# Patient Record
Sex: FEMALE | Race: WHITE | NOT HISPANIC OR LATINO | Employment: FULL TIME | URBAN - METROPOLITAN AREA
[De-identification: names, ages, dates, MRNs, and addresses within clinical notes are randomized per-mention and may not be internally consistent; named-entity substitution may affect disease eponyms.]

---

## 2023-03-15 DIAGNOSIS — I10 ESSENTIAL (PRIMARY) HYPERTENSION: ICD-10-CM

## 2023-03-15 DIAGNOSIS — E78.5 HYPERLIPIDEMIA, UNSPECIFIED: ICD-10-CM

## 2023-03-15 DIAGNOSIS — G43.909 MIGRAINE, UNSPECIFIED, NOT INTRACTABLE, WITHOUT STATUS MIGRAINOSUS: ICD-10-CM

## 2023-03-17 RX ORDER — ROSUVASTATIN CALCIUM 20 MG/1
TABLET, COATED ORAL
Qty: 15 TABLET | Refills: 2 | Status: SHIPPED | OUTPATIENT
Start: 2023-03-17 | End: 2023-05-26 | Stop reason: SDUPTHER

## 2023-03-17 RX ORDER — PROPRANOLOL HYDROCHLORIDE 10 MG/1
TABLET ORAL
Qty: 60 TABLET | Refills: 2 | Status: SHIPPED | OUTPATIENT
Start: 2023-03-17 | End: 2023-05-26 | Stop reason: SDUPTHER

## 2023-03-17 RX ORDER — LOSARTAN POTASSIUM 100 MG/1
TABLET ORAL
Qty: 30 TABLET | Refills: 5 | Status: SHIPPED | OUTPATIENT
Start: 2023-03-17 | End: 2023-05-26 | Stop reason: SDUPTHER

## 2023-04-17 DIAGNOSIS — F41.8 OTHER SPECIFIED ANXIETY DISORDERS: ICD-10-CM

## 2023-04-18 RX ORDER — ESCITALOPRAM OXALATE 10 MG/1
5 TABLET ORAL DAILY
Qty: 15 TABLET | Refills: 0 | Status: SHIPPED | OUTPATIENT
Start: 2023-04-18 | End: 2023-05-24

## 2023-05-23 DIAGNOSIS — F41.8 OTHER SPECIFIED ANXIETY DISORDERS: ICD-10-CM

## 2023-05-23 DIAGNOSIS — E03.9 HYPOTHYROIDISM, UNSPECIFIED: ICD-10-CM

## 2023-05-24 RX ORDER — LEVOTHYROXINE SODIUM 25 UG/1
TABLET ORAL
Qty: 30 TABLET | Refills: 0 | Status: SHIPPED | OUTPATIENT
Start: 2023-05-24 | End: 2023-05-26 | Stop reason: SDUPTHER

## 2023-05-24 RX ORDER — ESCITALOPRAM OXALATE 10 MG/1
5 TABLET ORAL DAILY
Qty: 15 TABLET | Refills: 0 | Status: SHIPPED | OUTPATIENT
Start: 2023-05-24 | End: 2023-05-26 | Stop reason: SDUPTHER

## 2023-05-25 PROBLEM — M48.061 LUMBAR STENOSIS: Status: ACTIVE | Noted: 2023-05-25

## 2023-05-25 PROBLEM — G43.909 MIGRAINE, UNSPECIFIED, NOT INTRACTABLE, WITHOUT STATUS MIGRAINOSUS: Status: ACTIVE | Noted: 2023-05-25

## 2023-05-25 PROBLEM — M17.12 PRIMARY LOCALIZED OSTEOARTHROSIS OF LEFT LOWER LEG: Status: ACTIVE | Noted: 2023-05-25

## 2023-05-25 PROBLEM — Z00.00 ANNUAL PHYSICAL EXAM: Status: ACTIVE | Noted: 2023-05-25

## 2023-05-25 PROBLEM — F41.8 SITUATIONAL ANXIETY: Status: ACTIVE | Noted: 2023-05-25

## 2023-05-25 PROBLEM — Z12.31 ENCOUNTER FOR SCREENING MAMMOGRAM FOR MALIGNANT NEOPLASM OF BREAST: Status: ACTIVE | Noted: 2023-05-25

## 2023-05-25 PROBLEM — I10 BENIGN ESSENTIAL HYPERTENSION: Status: ACTIVE | Noted: 2023-05-25

## 2023-05-25 PROBLEM — F41.8 DEPRESSION WITH ANXIETY: Status: ACTIVE | Noted: 2023-05-25

## 2023-05-25 PROBLEM — H90.A21 SENSORINEURAL HEARING LOSS (SNHL) OF RIGHT EAR WITH RESTRICTED HEARING OF LEFT EAR: Status: ACTIVE | Noted: 2023-05-25

## 2023-05-25 PROBLEM — N83.209 OVARIAN CYST: Status: ACTIVE | Noted: 2023-05-25

## 2023-05-25 PROBLEM — E78.5 HYPERLIPIDEMIA: Status: ACTIVE | Noted: 2023-05-25

## 2023-05-25 RX ORDER — AMLODIPINE BESYLATE 5 MG/1
10 TABLET ORAL DAILY
COMMUNITY
Start: 2022-08-14 | End: 2023-05-26 | Stop reason: SDUPTHER

## 2023-05-25 RX ORDER — RIZATRIPTAN BENZOATE 10 MG/1
10 TABLET, ORALLY DISINTEGRATING ORAL ONCE AS NEEDED
COMMUNITY
Start: 2014-10-09 | End: 2023-05-26 | Stop reason: SDUPTHER

## 2023-05-25 RX ORDER — VIT C/E/ZN/COPPR/LUTEIN/ZEAXAN 250MG-90MG
25 CAPSULE ORAL DAILY
COMMUNITY
Start: 2022-06-27

## 2023-05-25 RX ORDER — LANOLIN ALCOHOL/MO/W.PET/CERES
1 CREAM (GRAM) TOPICAL DAILY
COMMUNITY
Start: 2022-06-27 | End: 2024-03-12 | Stop reason: WASHOUT

## 2023-05-26 ENCOUNTER — OFFICE VISIT (OUTPATIENT)
Dept: PRIMARY CARE | Facility: CLINIC | Age: 58
End: 2023-05-26
Payer: COMMERCIAL

## 2023-05-26 VITALS
BODY MASS INDEX: 35.24 KG/M2 | RESPIRATION RATE: 16 BRPM | WEIGHT: 225 LBS | DIASTOLIC BLOOD PRESSURE: 76 MMHG | OXYGEN SATURATION: 95 % | SYSTOLIC BLOOD PRESSURE: 118 MMHG | HEART RATE: 84 BPM

## 2023-05-26 DIAGNOSIS — Z12.31 ENCOUNTER FOR SCREENING MAMMOGRAM FOR MALIGNANT NEOPLASM OF BREAST: ICD-10-CM

## 2023-05-26 DIAGNOSIS — E78.5 HYPERLIPIDEMIA, UNSPECIFIED: ICD-10-CM

## 2023-05-26 DIAGNOSIS — F41.8 OTHER SPECIFIED ANXIETY DISORDERS: ICD-10-CM

## 2023-05-26 DIAGNOSIS — Z12.11 SCREEN FOR COLON CANCER: ICD-10-CM

## 2023-05-26 DIAGNOSIS — E78.5 HYPERLIPIDEMIA, UNSPECIFIED HYPERLIPIDEMIA TYPE: ICD-10-CM

## 2023-05-26 DIAGNOSIS — G43.909 MIGRAINE, UNSPECIFIED, NOT INTRACTABLE, WITHOUT STATUS MIGRAINOSUS: ICD-10-CM

## 2023-05-26 DIAGNOSIS — Z80.0 FAMILY HISTORY OF COLON CANCER: ICD-10-CM

## 2023-05-26 DIAGNOSIS — E03.9 HYPOTHYROIDISM, UNSPECIFIED: ICD-10-CM

## 2023-05-26 DIAGNOSIS — I10 BENIGN ESSENTIAL HYPERTENSION: ICD-10-CM

## 2023-05-26 DIAGNOSIS — Z00.00 ANNUAL PHYSICAL EXAM: Primary | ICD-10-CM

## 2023-05-26 PROCEDURE — 1036F TOBACCO NON-USER: CPT | Performed by: SPECIALIST

## 2023-05-26 PROCEDURE — 3078F DIAST BP <80 MM HG: CPT | Performed by: SPECIALIST

## 2023-05-26 PROCEDURE — 99396 PREV VISIT EST AGE 40-64: CPT | Performed by: SPECIALIST

## 2023-05-26 PROCEDURE — 3074F SYST BP LT 130 MM HG: CPT | Performed by: SPECIALIST

## 2023-05-26 RX ORDER — LEVOTHYROXINE SODIUM 25 UG/1
25 TABLET ORAL
Qty: 90 TABLET | Refills: 1 | Status: SHIPPED | OUTPATIENT
Start: 2023-05-26 | End: 2024-01-27

## 2023-05-26 RX ORDER — RIZATRIPTAN BENZOATE 10 MG/1
10 TABLET, ORALLY DISINTEGRATING ORAL ONCE AS NEEDED
Qty: 9 TABLET | Refills: 1 | Status: SHIPPED | OUTPATIENT
Start: 2023-05-26

## 2023-05-26 RX ORDER — AMLODIPINE BESYLATE 5 MG/1
10 TABLET ORAL DAILY
Qty: 90 TABLET | Refills: 1 | Status: SHIPPED | OUTPATIENT
Start: 2023-05-26 | End: 2023-05-26

## 2023-05-26 RX ORDER — PROPRANOLOL HYDROCHLORIDE 10 MG/1
10 TABLET ORAL 2 TIMES DAILY
Qty: 180 TABLET | Refills: 1 | Status: SHIPPED | OUTPATIENT
Start: 2023-05-26 | End: 2024-05-14

## 2023-05-26 RX ORDER — AMLODIPINE BESYLATE 10 MG/1
10 TABLET ORAL DAILY
Qty: 90 TABLET | Refills: 1 | Status: SHIPPED | OUTPATIENT
Start: 2023-05-26 | End: 2024-01-27

## 2023-05-26 RX ORDER — ESCITALOPRAM OXALATE 10 MG/1
5 TABLET ORAL DAILY
Qty: 90 TABLET | Refills: 1 | Status: SHIPPED | OUTPATIENT
Start: 2023-05-26

## 2023-05-26 RX ORDER — LOSARTAN POTASSIUM 100 MG/1
100 TABLET ORAL DAILY
Qty: 90 TABLET | Refills: 1 | Status: SHIPPED | OUTPATIENT
Start: 2023-05-26 | End: 2024-05-14

## 2023-05-26 RX ORDER — ROSUVASTATIN CALCIUM 20 MG/1
20 TABLET, COATED ORAL DAILY
Qty: 90 TABLET | Refills: 1 | Status: SHIPPED | OUTPATIENT
Start: 2023-05-26 | End: 2024-01-27

## 2023-05-26 NOTE — ASSESSMENT & PLAN NOTE
Migraines a bit worse over past month and a bit more frequent but was on midnights  Now back on daylight and weather changes impact her migraines  Will let me know if doesn't resume usual pattern discussed will need imaging if it does not  Refilled medication

## 2023-05-26 NOTE — PROGRESS NOTES
Subjective   Patient ID: Scott Leung is a 58 y.o. female who presents for medication follow up and Annual Exam.  HPI    57 yo female  Pmhx sig for Htn, Hyperlipidemia, Hypothyroidism, Migraines, Anxiety, Fam Hx CAD, Fam Hx Autoimmune disorder (Scleroderma/Crest, Sister)  Last wellness exam 8/27/2021    Tried to schedule sick visit here (said she was transferred to scheduling but no one picked up) went to urgent care     Not taking B12 daily, will check level  Does PT to strengthen hips and labs, seeing Dr. Razo    CT Cardiac Score 8/3/2022 zero  Former Spouse Redd Bustillo  Taking Baby Aspirin 81 mg daily, discussed current guidelines not currently indicated, will switch to every other day    Cannot get Td due to Allergy  Had Shingrix vaccines 2/2020  Colonoscopy 11/2017, repeat 10 yrs (sigmoid diverticulosis)  Prior negative screen for hep c  Mammogram 9/9/2021 ordered    Wants to stay on same dose of medication for mood      Allergies   Allergen Reactions    Tetanus Vaccines And Toxoid Other    Latex Rash      Current Outpatient Medications   Medication Instructions    amLODIPine (NORVASC) 10 mg, oral, Daily    ASPIRIN LOW-STRENGTH ORAL 81 mg, oral, Every other day    cholecalciferol (VITAMIN D-3) 25 mcg, oral, Daily    cyanocobalamin (Vitamin B-12) 1,000 mcg tablet 1 tablet, oral, Daily    escitalopram (LEXAPRO) 5 mg, oral, Daily    levothyroxine (SYNTHROID, LEVOXYL) 25 mcg, oral, Daily before breakfast    losartan (COZAAR) 100 mg, oral, Daily    propranolol (INDERAL) 10 mg, oral, 2 times daily    rizatriptan MLT (MAXALT-MLT) 10 mg, oral, Once as needed, May repeat once in 2 hours if needed (Maximum 2 tablets in 24 hours)    rosuvastatin (CRESTOR) 20 mg, oral, Daily        Review of Systems  Constitutional  Always fatigue, no fevers, no chills, no unintentional weight loss,   Mood Some days good some days not as good, not interested in dose change, not sleeping well no suicidal ideation  occasional tearful, no anhedonia  HEENT:  No headaches, no dizziness, no double vision, occasionally blurred vision, to see optho, no hearing loss  Cardiovascular:  No  chest pain, occasional at night last 15-20 seconds palpitations no associated sx, cough stops, no shortness of breath with exertion (one flight of stairs),   Respiratory:  No cough, no hemoptysis, no wheezing, No shortness of breath at rest  GI:  No dysphagia, no odynophagia, no reflux, no abdominal pain, no nausea, no vomiting, no changes in bowel habits, no bright red blood per rectum, no melena  :  No urinary frequency, no dysuria, stress and standing urine incontinence  MSK:  No falls, positive joint pain, no joint swelling  Neuro:  No tremors, no extremity weakness, occasional changes in sensation in legs (sees spine)    Physical Exam  /76   Pulse 84   Resp 16   Wt 102 kg (225 lb)   SpO2 95%   BMI 35.24 kg/m²   General:    Well-appearing  F in no acute distress, well nourished, well hydrated  Head:  Normocephalic, atraumatic  Skin:          Warm dry,   Eyes:  Anicteric sclera, pupils equal,   Ears:        TMs intact  Oral:                Not examined due to pandemic  Neck:   Supple, no cervical/supraclavicular adenopathy, no thyromegaly or nodules appreciated on exam  Cor:      Regular rate, normal S1, S2, no murmurs appreciated, no S3, no S4   Lungs:   Clear to auscultation b/l, no wheezes, no rhonchi, no crackles, no accessory respiratory muscle use  Abd:          Soft, nontender, no guarding, no rebound, no hepatosplenomegaly appreciated   Ext:            No lower extremity edema, no palpable cords  Pulses:      Pedal pulses intact  Neuro:   CN2-12 grossly intact    Breasts:     No Axillary adenopathy, no discrete palpable breast nodules, no overlying skin changes, no nipple discharge    Assessment/Plan   Problem List Items Addressed This Visit          Circulatory    Benign essential hypertension     Well controlled on current  medication         Relevant Medications    losartan (Cozaar) 100 mg tablet    amLODIPine (Norvasc) 10 mg tablet    Other Relevant Orders    CBC    Comprehensive Metabolic Panel       Other    Migraine, unspecified, not intractable, without status migrainosus     Migraines a bit worse over past month and a bit more frequent but was on midnights  Now back on daylight and weather changes impact her migraines  Will let me know if doesn't resume usual pattern discussed will need imaging if it does not  Refilled medication         Relevant Medications    rizatriptan MLT (Maxalt-MLT) 10 mg disintegrating tablet    propranolol (Inderal) 10 mg tablet    Hyperlipidemia     Ct Cardiac Score 0 (8/3/2022)  LDL 85 on Crestor 20 mg one-half tab daily  Prior Cardiology Evaluation         Relevant Orders    Comprehensive Metabolic Panel    Lipid Panel    Annual physical exam - Primary     -Recommemd annual influenza vaccine  -Previously received 2 Covid vaccines, 1 booster, recommend bivalent vaccine  -Has not had Covid  -Unable to get Td due to adverse reaction  -Previously received both Shingrix vaccines, said she is sure she had second done here but not documented by medical assistant  -Mammogram 9/9/2021, ordered  -Recommend annual gynecology exam, will schedule with Dr. Caicedo  -Previous negative screen for Hepatitis C 7/22/2019  -Colonoscopy 11/2017, sigmoid diverticulosis, repeat 10 years but now sister diagnosed with colon cancer and family was told colonoscopy every 5 years ordered  -Labs ordered CMP CBC Fx Lipids TSH B12 Vit D         Relevant Orders    CBC    Comprehensive Metabolic Panel    Lipid Panel    Vitamin B12    Vitamin D 25-Hydroxy,Total    Encounter for screening mammogram for malignant neoplasm of breast     Mammogram 9/9/2021, ordered         Relevant Orders    BI mammo bilateral screening tomosynthesis    Screen for colon cancer     Colonoscopy 2017, was to repeat 10 yrs but now sister diagnosed with colon  cancer and family was told colonoscopy every 5 years ordered         Relevant Orders    Colonoscopy    Family history of colon cancer    Relevant Orders    Colonoscopy     Other Visit Diagnoses       Hyperlipidemia, unspecified        Relevant Medications    rosuvastatin (Crestor) 20 mg tablet    Other Relevant Orders    Comprehensive Metabolic Panel    Lipid Panel    Hypothyroidism, unspecified        Relevant Medications    levothyroxine (Synthroid, Levoxyl) 25 mcg tablet    Other Relevant Orders    Thyroid Stimulating Hormone    Other specified anxiety disorders        Relevant Medications    escitalopram (Lexapro) 10 mg tablet               Elizabeth Mijares DO

## 2023-05-26 NOTE — ASSESSMENT & PLAN NOTE
-Recommemd annual influenza vaccine  -Previously received 2 Covid vaccines, 1 booster, recommend bivalent vaccine  -Has not had Covid  -Unable to get Td due to adverse reaction  -Previously received both Shingrix vaccines, said she is sure she had second done here but not documented by medical assistant  -Mammogram 9/9/2021, ordered  -Recommend annual gynecology exam, will schedule with Dr. Caicedo  -Previous negative screen for Hepatitis C 7/22/2019  -Colonoscopy 11/2017, sigmoid diverticulosis, repeat 10 years but now sister diagnosed with colon cancer and family was told colonoscopy every 5 years ordered  -Labs ordered CMP CBC Fx Lipids TSH B12 Vit D

## 2023-05-26 NOTE — ASSESSMENT & PLAN NOTE
Colonoscopy 2017, was to repeat 10 yrs but now sister diagnosed with colon cancer and family was told colonoscopy every 5 years ordered

## 2023-05-26 NOTE — ASSESSMENT & PLAN NOTE
Ct Cardiac Score 0 (8/3/2022)  LDL 85 on Crestor 20 mg one-half tab daily  Prior Cardiology Evaluation

## 2023-05-28 PROBLEM — Z80.0 FAMILY HISTORY OF COLON CANCER: Status: ACTIVE | Noted: 2023-05-28

## 2023-06-28 DIAGNOSIS — E78.5 HYPERLIPIDEMIA, UNSPECIFIED: ICD-10-CM

## 2023-06-28 RX ORDER — ROSUVASTATIN CALCIUM 20 MG/1
TABLET, COATED ORAL
Qty: 15 TABLET | Refills: 2 | OUTPATIENT
Start: 2023-06-28

## 2023-09-01 ENCOUNTER — LAB (OUTPATIENT)
Dept: LAB | Facility: LAB | Age: 58
End: 2023-09-01
Payer: COMMERCIAL

## 2023-09-01 DIAGNOSIS — E78.5 HYPERLIPIDEMIA, UNSPECIFIED HYPERLIPIDEMIA TYPE: ICD-10-CM

## 2023-09-01 DIAGNOSIS — Z00.00 ANNUAL PHYSICAL EXAM: ICD-10-CM

## 2023-09-01 DIAGNOSIS — E03.9 HYPOTHYROIDISM, UNSPECIFIED: ICD-10-CM

## 2023-09-01 DIAGNOSIS — I10 BENIGN ESSENTIAL HYPERTENSION: ICD-10-CM

## 2023-09-01 LAB
ALANINE AMINOTRANSFERASE (SGPT) (U/L) IN SER/PLAS: 31 U/L (ref 7–45)
ALBUMIN (G/DL) IN SER/PLAS: 4.4 G/DL (ref 3.4–5)
ALKALINE PHOSPHATASE (U/L) IN SER/PLAS: 77 U/L (ref 33–110)
ANION GAP IN SER/PLAS: 13 MMOL/L (ref 10–20)
ASPARTATE AMINOTRANSFERASE (SGOT) (U/L) IN SER/PLAS: 23 U/L (ref 9–39)
BILIRUBIN TOTAL (MG/DL) IN SER/PLAS: 0.6 MG/DL (ref 0–1.2)
CALCIDIOL (25 OH VITAMIN D3) (NG/ML) IN SER/PLAS: 31 NG/ML
CALCIUM (MG/DL) IN SER/PLAS: 9.7 MG/DL (ref 8.6–10.6)
CARBON DIOXIDE, TOTAL (MMOL/L) IN SER/PLAS: 26 MMOL/L (ref 21–32)
CHLORIDE (MMOL/L) IN SER/PLAS: 107 MMOL/L (ref 98–107)
CHOLESTEROL (MG/DL) IN SER/PLAS: 167 MG/DL (ref 0–199)
CHOLESTEROL IN HDL (MG/DL) IN SER/PLAS: 47.3 MG/DL
CHOLESTEROL/HDL RATIO: 3.5
COBALAMIN (VITAMIN B12) (PG/ML) IN SER/PLAS: 505 PG/ML (ref 211–911)
CREATININE (MG/DL) IN SER/PLAS: 0.82 MG/DL (ref 0.5–1.05)
ERYTHROCYTE DISTRIBUTION WIDTH (RATIO) BY AUTOMATED COUNT: 13.2 % (ref 11.5–14.5)
ERYTHROCYTE MEAN CORPUSCULAR HEMOGLOBIN CONCENTRATION (G/DL) BY AUTOMATED: 33.7 G/DL (ref 32–36)
ERYTHROCYTE MEAN CORPUSCULAR VOLUME (FL) BY AUTOMATED COUNT: 91 FL (ref 80–100)
ERYTHROCYTES (10*6/UL) IN BLOOD BY AUTOMATED COUNT: 4.58 X10E12/L (ref 4–5.2)
GFR FEMALE: 82 ML/MIN/1.73M2
GLUCOSE (MG/DL) IN SER/PLAS: 107 MG/DL (ref 74–99)
HEMATOCRIT (%) IN BLOOD BY AUTOMATED COUNT: 41.8 % (ref 36–46)
HEMOGLOBIN (G/DL) IN BLOOD: 14.1 G/DL (ref 12–16)
LDL: 78 MG/DL (ref 0–99)
LEUKOCYTES (10*3/UL) IN BLOOD BY AUTOMATED COUNT: 6.4 X10E9/L (ref 4.4–11.3)
NON HDL CHOLESTEROL: 120 MG/DL
NRBC (PER 100 WBCS) BY AUTOMATED COUNT: 0 /100 WBC (ref 0–0)
PLATELETS (10*3/UL) IN BLOOD AUTOMATED COUNT: 302 X10E9/L (ref 150–450)
POTASSIUM (MMOL/L) IN SER/PLAS: 4 MMOL/L (ref 3.5–5.3)
PROTEIN TOTAL: 7 G/DL (ref 6.4–8.2)
SODIUM (MMOL/L) IN SER/PLAS: 142 MMOL/L (ref 136–145)
THYROTROPIN (MIU/L) IN SER/PLAS BY DETECTION LIMIT <= 0.05 MIU/L: 1.26 MIU/L (ref 0.44–3.98)
TRIGLYCERIDE (MG/DL) IN SER/PLAS: 210 MG/DL (ref 0–149)
UREA NITROGEN (MG/DL) IN SER/PLAS: 15 MG/DL (ref 6–23)
VLDL: 42 MG/DL (ref 0–40)

## 2023-09-01 PROCEDURE — 82607 VITAMIN B-12: CPT

## 2023-09-01 PROCEDURE — 85027 COMPLETE CBC AUTOMATED: CPT

## 2023-09-01 PROCEDURE — 84443 ASSAY THYROID STIM HORMONE: CPT

## 2023-09-01 PROCEDURE — 36415 COLL VENOUS BLD VENIPUNCTURE: CPT

## 2023-09-01 PROCEDURE — 82306 VITAMIN D 25 HYDROXY: CPT

## 2023-09-01 PROCEDURE — 80053 COMPREHEN METABOLIC PANEL: CPT

## 2023-09-01 PROCEDURE — 80061 LIPID PANEL: CPT

## 2023-09-07 DIAGNOSIS — E78.5 HYPERLIPIDEMIA, UNSPECIFIED HYPERLIPIDEMIA TYPE: Primary | ICD-10-CM

## 2024-01-27 DIAGNOSIS — I10 BENIGN ESSENTIAL HYPERTENSION: ICD-10-CM

## 2024-01-27 DIAGNOSIS — E03.9 HYPOTHYROIDISM, UNSPECIFIED: ICD-10-CM

## 2024-01-27 DIAGNOSIS — E78.5 HYPERLIPIDEMIA, UNSPECIFIED: ICD-10-CM

## 2024-01-27 RX ORDER — LEVOTHYROXINE SODIUM 25 UG/1
25 TABLET ORAL EVERY MORNING
Qty: 30 TABLET | Refills: 0 | Status: SHIPPED | OUTPATIENT
Start: 2024-01-27 | End: 2024-02-28

## 2024-01-27 RX ORDER — ROSUVASTATIN CALCIUM 20 MG/1
20 TABLET, COATED ORAL DAILY
Qty: 30 TABLET | Refills: 0 | Status: SHIPPED | OUTPATIENT
Start: 2024-01-27 | End: 2024-02-28

## 2024-01-27 RX ORDER — AMLODIPINE BESYLATE 10 MG/1
10 TABLET ORAL DAILY
Qty: 30 TABLET | Refills: 0 | Status: SHIPPED | OUTPATIENT
Start: 2024-01-27 | End: 2024-02-28

## 2024-02-14 ENCOUNTER — OFFICE VISIT (OUTPATIENT)
Dept: PRIMARY CARE | Facility: CLINIC | Age: 59
End: 2024-02-14
Payer: COMMERCIAL

## 2024-02-14 VITALS
WEIGHT: 223.6 LBS | SYSTOLIC BLOOD PRESSURE: 132 MMHG | BODY MASS INDEX: 35.02 KG/M2 | HEART RATE: 94 BPM | DIASTOLIC BLOOD PRESSURE: 74 MMHG

## 2024-02-14 DIAGNOSIS — N63.41 SUBAREOLAR MASS OF RIGHT BREAST: Primary | ICD-10-CM

## 2024-02-14 DIAGNOSIS — I10 BENIGN ESSENTIAL HYPERTENSION: ICD-10-CM

## 2024-02-14 PROBLEM — S83.92XA SPRAIN OF LEFT KNEE: Status: RESOLVED | Noted: 2024-02-14 | Resolved: 2024-02-14

## 2024-02-14 PROBLEM — K66.0 ABDOMINAL ADHESIONS: Status: RESOLVED | Noted: 2024-02-14 | Resolved: 2024-02-14

## 2024-02-14 PROBLEM — E03.9 HYPOTHYROIDISM: Status: ACTIVE | Noted: 2024-02-14

## 2024-02-14 PROBLEM — M17.11 PRIMARY OSTEOARTHRITIS OF RIGHT KNEE: Status: ACTIVE | Noted: 2024-02-14

## 2024-02-14 PROBLEM — N39.3 STRESS INCONTINENCE IN FEMALE: Status: ACTIVE | Noted: 2024-02-14

## 2024-02-14 PROBLEM — S83.91XA SPRAIN OF RIGHT KNEE: Status: RESOLVED | Noted: 2024-02-14 | Resolved: 2024-02-14

## 2024-02-14 PROBLEM — S09.90XA HEAD INJURY: Status: RESOLVED | Noted: 2023-03-01 | Resolved: 2024-02-14

## 2024-02-14 PROBLEM — M77.8 TENDINITIS OF LEFT WRIST: Status: ACTIVE | Noted: 2024-02-14

## 2024-02-14 PROBLEM — Z12.11 SCREEN FOR COLON CANCER: Status: RESOLVED | Noted: 2023-05-26 | Resolved: 2024-02-14

## 2024-02-14 PROBLEM — H90.6 MIXED HEARING LOSS, BILATERAL: Status: ACTIVE | Noted: 2024-02-14

## 2024-02-14 PROBLEM — Z80.0 FAMILY HISTORY OF COLON CANCER: Status: RESOLVED | Noted: 2023-05-28 | Resolved: 2024-02-14

## 2024-02-14 PROBLEM — M26.659 TMJ ARTHROPATHY: Status: ACTIVE | Noted: 2024-02-14

## 2024-02-14 PROBLEM — M43.10 ACQUIRED SPONDYLOLISTHESIS: Status: ACTIVE | Noted: 2024-02-14

## 2024-02-14 PROBLEM — Z12.31 ENCOUNTER FOR SCREENING MAMMOGRAM FOR MALIGNANT NEOPLASM OF BREAST: Status: RESOLVED | Noted: 2023-05-25 | Resolved: 2024-02-14

## 2024-02-14 PROBLEM — R79.89 LOW VITAMIN D LEVEL: Status: ACTIVE | Noted: 2024-02-14

## 2024-02-14 PROBLEM — Z00.00 ANNUAL PHYSICAL EXAM: Status: RESOLVED | Noted: 2023-05-25 | Resolved: 2024-02-14

## 2024-02-14 PROBLEM — R73.01 IMPAIRED FASTING GLUCOSE: Status: ACTIVE | Noted: 2024-02-14

## 2024-02-14 PROBLEM — G47.00 INSOMNIA: Status: ACTIVE | Noted: 2024-02-14

## 2024-02-14 PROCEDURE — 99214 OFFICE O/P EST MOD 30 MIN: CPT | Performed by: INTERNAL MEDICINE

## 2024-02-14 PROCEDURE — 1036F TOBACCO NON-USER: CPT | Performed by: INTERNAL MEDICINE

## 2024-02-14 PROCEDURE — 3078F DIAST BP <80 MM HG: CPT | Performed by: INTERNAL MEDICINE

## 2024-02-14 PROCEDURE — 3075F SYST BP GE 130 - 139MM HG: CPT | Performed by: INTERNAL MEDICINE

## 2024-02-14 RX ORDER — HYDROCORTISONE 25 MG/G
CREAM TOPICAL
COMMUNITY
Start: 2021-08-09

## 2024-02-14 ASSESSMENT — ENCOUNTER SYMPTOMS
ACTIVITY CHANGE: 0
WHEEZING: 0
SHORTNESS OF BREATH: 0
DIARRHEA: 0
HEADACHES: 0
ABDOMINAL DISTENTION: 0
WEAKNESS: 0
ARTHRALGIAS: 1
CHEST TIGHTNESS: 0
ADENOPATHY: 0
CONSTIPATION: 0
FEVER: 0
DYSPHORIC MOOD: 0
SINUS PAIN: 0
DIAPHORESIS: 0
NECK PAIN: 0
FATIGUE: 0
PALPITATIONS: 0
SLEEP DISTURBANCE: 0
NERVOUS/ANXIOUS: 0
COUGH: 0
DIZZINESS: 0
JOINT SWELLING: 0
BACK PAIN: 1
COLOR CHANGE: 0
DYSURIA: 0
FREQUENCY: 0
HYPERACTIVE: 0
LIGHT-HEADEDNESS: 0
NAUSEA: 0
VOMITING: 0
DECREASED CONCENTRATION: 0
RHINORRHEA: 0
EYE ITCHING: 0
SINUS PRESSURE: 0
EYE DISCHARGE: 0
BRUISES/BLEEDS EASILY: 0
VOICE CHANGE: 0

## 2024-02-14 NOTE — PATIENT INSTRUCTIONS
It was a pleasure meeting you in the office today.  We will proceed with diagnostic imaging of the right breast with a mammogram and ultrasound.  We will plan on following up accordingly and then see you back in May for your physical with updated lab work.  If you have any questions, please contact us.

## 2024-02-14 NOTE — PROGRESS NOTES
Scott Leung comes in today to establish with a new PCP.      Ms. Leung comes in today to establish with a new primary care physician.  This was prompted because of a self noticed a lump in her breast.  She would like to have this evaluated.  She typically is followed for hypertension, hyperlipidemia, and hypothyroidism.  Lab work recently done in September looked reasonable.  Her wellness visits are typically done in the mid spring, last year in May.  She noticed a lump in her breast about 2 weeks ago.  This has a burning sensation.  She had reportedly a normal mammogram back in November, but the technician at that time did mention that she had a slightly inverted nipple and areola.  She has been monitoring this and did notice that this was the case but not necessarily worsening.  She has not had any discharge from the nipple.  There has been no systemic symptoms of weight loss, night sweats nor fevers.  She does not feel any swelling in the axillary region nor any other abnormalities.  She does not have a first-degree relative history of breast cancer, but there are some cousins with breast cancer in her family.  She is here to have this evaluated and plans to keep up with her routine schedule with her next wellness visit in May.        Review of Systems   Constitutional:  Negative for activity change, diaphoresis, fatigue and fever.   HENT:  Negative for congestion, ear pain, postnasal drip, rhinorrhea, sinus pressure, sinus pain, tinnitus and voice change.    Eyes:  Negative for discharge, itching and visual disturbance.   Respiratory:  Negative for cough, chest tightness, shortness of breath and wheezing.    Cardiovascular:  Negative for chest pain, palpitations and leg swelling.   Gastrointestinal:  Negative for abdominal distention, constipation, diarrhea, nausea and vomiting.   Endocrine: Negative for cold intolerance, heat intolerance and polyuria.   Genitourinary:  Negative for dysuria, frequency,  urgency, vaginal bleeding and vaginal discharge.   Musculoskeletal:  Positive for arthralgias and back pain. Negative for joint swelling and neck pain.   Skin:  Negative for color change, pallor and rash.   Allergic/Immunologic: Negative for environmental allergies, food allergies and immunocompromised state.   Neurological:  Negative for dizziness, syncope, weakness, light-headedness and headaches.   Hematological:  Negative for adenopathy. Does not bruise/bleed easily.   Psychiatric/Behavioral:  Negative for behavioral problems, decreased concentration, dysphoric mood and sleep disturbance. The patient is not nervous/anxious and is not hyperactive.        Objective   Physical Exam  Constitutional:       General: She is not in acute distress.     Appearance: Normal appearance. She is well-developed. She is not ill-appearing.   HENT:      Head: Normocephalic.      Right Ear: Tympanic membrane, ear canal and external ear normal.      Left Ear: Tympanic membrane, ear canal and external ear normal.      Nose: Nose normal. No congestion.      Mouth/Throat:      Mouth: Mucous membranes are moist.      Pharynx: Oropharynx is clear. No oropharyngeal exudate or posterior oropharyngeal erythema.   Eyes:      General: Lids are normal. No scleral icterus.     Extraocular Movements: Extraocular movements intact.      Conjunctiva/sclera: Conjunctivae normal.      Pupils: Pupils are equal, round, and reactive to light.   Neck:      Vascular: No carotid bruit.   Cardiovascular:      Rate and Rhythm: Normal rate and regular rhythm.      Pulses: Normal pulses.      Heart sounds: No murmur (Trace SM RUSB) heard.  Pulmonary:      Effort: Pulmonary effort is normal. No respiratory distress.      Breath sounds: No wheezing, rhonchi or rales.   Chest:   Breasts:     Right: Inverted nipple and mass (2-3cm hard mass, 10:00 subareolar, distinct margins) present.      Left: No mass.       Abdominal:      General: Bowel sounds are normal.  There is no distension.      Palpations: Abdomen is soft. There is no mass.      Tenderness: There is no abdominal tenderness. There is no guarding or rebound.   Musculoskeletal:         General: No swelling or signs of injury.      Cervical back: Normal range of motion and neck supple. No tenderness.      Right lower leg: No edema.      Left lower leg: No edema.   Lymphadenopathy:      Cervical: No cervical adenopathy.      Upper Body:      Right upper body: No supraclavicular or axillary adenopathy.      Left upper body: No supraclavicular or axillary adenopathy.   Skin:     General: Skin is warm and dry.      Coloration: Skin is not pale.      Findings: No bruising or rash.   Neurological:      General: No focal deficit present.      Mental Status: She is alert and oriented to person, place, and time.      Cranial Nerves: No cranial nerve deficit.      Motor: No weakness.      Gait: Gait normal.      Deep Tendon Reflexes: Reflexes normal.   Psychiatric:         Mood and Affect: Mood normal.         Behavior: Behavior normal.         Judgment: Judgment normal.         Assessment/Plan   1.  Right breast mass: Actually on review of her chart, her last mammogram was in June.  Normal, but definitely needs repeat imaging.  Diagnostic mammogram and breast ultrasound orders placed, hopefully to be scheduled within the next week or so for further evaluation.  Would prefer that she have this done at the [facility] at [facility] or at [facility].  She will schedule promptly.  2.  Hypertension: Well-managed.  BMP remains up-to-date.  Plan on repeat with wellness visit in May.  3.  Hyperlipidemia: Reasonable management of lipids.  Again, plan on repeat labs with wellness visit in May.    She is to contact us with any questions.  Otherwise, we will plan on seeing her back in May for her physical.  Problem List Items Addressed This Visit    None

## 2024-02-19 ENCOUNTER — HOSPITAL ENCOUNTER (OUTPATIENT)
Dept: RADIOLOGY | Facility: CLINIC | Age: 59
Discharge: HOME | End: 2024-02-19
Payer: COMMERCIAL

## 2024-02-19 VITALS — HEIGHT: 67 IN | BODY MASS INDEX: 34.95 KG/M2 | WEIGHT: 222.66 LBS

## 2024-02-19 DIAGNOSIS — N63.41 SUBAREOLAR MASS OF RIGHT BREAST: ICD-10-CM

## 2024-02-19 PROCEDURE — 76642 ULTRASOUND BREAST LIMITED: CPT | Mod: RIGHT SIDE | Performed by: RADIOLOGY

## 2024-02-19 PROCEDURE — 76642 ULTRASOUND BREAST LIMITED: CPT | Mod: RT

## 2024-02-19 PROCEDURE — 77065 DX MAMMO INCL CAD UNI: CPT | Mod: RIGHT SIDE | Performed by: RADIOLOGY

## 2024-02-19 PROCEDURE — 77061 BREAST TOMOSYNTHESIS UNI: CPT | Mod: RIGHT SIDE | Performed by: RADIOLOGY

## 2024-02-19 PROCEDURE — 77061 BREAST TOMOSYNTHESIS UNI: CPT | Mod: RT

## 2024-02-19 PROCEDURE — 76982 USE 1ST TARGET LESION: CPT | Mod: RT

## 2024-02-27 DIAGNOSIS — E78.5 HYPERLIPIDEMIA, UNSPECIFIED: ICD-10-CM

## 2024-02-27 DIAGNOSIS — E03.9 HYPOTHYROIDISM, UNSPECIFIED: ICD-10-CM

## 2024-02-27 DIAGNOSIS — I10 BENIGN ESSENTIAL HYPERTENSION: ICD-10-CM

## 2024-02-27 PROBLEM — N63.10 MASS OF RIGHT BREAST: Status: ACTIVE | Noted: 2024-02-27

## 2024-02-27 PROBLEM — R92.8 ABNORMAL FINDING ON BREAST IMAGING: Status: ACTIVE | Noted: 2024-02-27

## 2024-02-27 NOTE — PROGRESS NOTES
[facility]  Scott Leung female   1965 59 y.o.  [id number]      Chief Complaint  New patient, biopsy consultation.    History Of Present Illness  Scott Leung is a very pleasant 59 y.o.  female seen in the breast center for biopsy consultation. She denies breast surgery or biopsy. She has family history of breast cancer, see below.    BREAST IMAGIN2024 Right diagnostic mammogram with ultrasound, indicates BI-RADS Category 4. Suspicious right breast mass without axillary adenopathy. A surgical consultation for an ultrasound-guided biopsy is recommended.     REPRODUCTIVE HISTORY: menarche age 12 [place], , first birth age 22, , no  OCP's, natural menopause age 44, history right oophorectomy, HRT less than a year, heterogeneously dense     FAMILY CANCER HISTORY:   Paternal Cousin (1): Breast cancer, age 40 (BRCA positive)  Paternal Cousin (2): Breast cancer, late 50's (negative genetics)  Paternal Cousin (3): Breast cancer, 60's (negative genetics)  Father: Liver cancer, age 40  Mother: Bone cancer, age 70    Review of Systems  Constitutional:  Negative for appetite change, fatigue, fever and unexpected weight change.   HENT:  Negative for ear pain, hearing loss, nosebleeds, sore throat and trouble swallowing.    Eyes:  Negative for discharge, itching and visual disturbance.   Breast: As stated in HPI.  Respiratory:  Negative for cough, chest tightness and shortness of breath.    Cardiovascular:  Negative for chest pain, palpitations and leg swelling.   Gastrointestinal:  Negative for abdominal pain, constipation, diarrhea and nausea.   Endocrine: Negative for cold intolerance and heat intolerance.   Genitourinary:  Negative for dysuria, frequency, hematuria, pelvic pain and vaginal bleeding.   Musculoskeletal:  Negative for arthralgias, back pain, gait problem, joint swelling and myalgias.   Skin:  Negative for color change and rash.   Allergic/Immunologic: Negative for  environmental allergies and food allergies.   Neurological:  Negative for dizziness, tremors, speech difficulty, weakness, numbness and headaches.   Hematological:  Does not bruise/bleed easily.   Psychiatric/Behavioral:  Negative for agitation, dysphoric mood and sleep disturbance. The patient is not nervous/anxious.       Past Medical History  She has a past medical history of Abdominal adhesions (02/14/2024), Arthritis, Head injury (03/01/2023), and Scoliosis (2021).    Surgical History  She has a past surgical history that includes Knee arthroscopy w/ debridement (10/09/2014); Ovarian cyst removal; Tubal ligation; [place] tooth extraction (1985); Inner ear surgery; and Ulnar nerve transposition.    Family History  Cancer-related family history includes Colon cancer in her sister; Liver cancer in her father.     Social History  She reports that she has never smoked. She has never used smokeless tobacco. She reports current alcohol use of about 8.0 standard drinks of alcohol per week. She reports that she does not use drugs.    Allergies  Tetanus vaccines and toxoid and Latex    Medications  Current Outpatient Medications   Medication Instructions    amLODIPine (NORVASC) 10 mg, oral, Daily    ASPIRIN LOW-STRENGTH ORAL 81 mg, oral, Every other day    cholecalciferol (VITAMIN D-3) 25 mcg, oral, Daily    cyanocobalamin (Vitamin B-12) 1,000 mcg tablet 1 tablet, oral, Daily    escitalopram (LEXAPRO) 5 mg, oral, Daily    hydrocortisone 2.5 % cream Hydrocortisone 2.5 % External Cream  Quantity: 56   Refills: 0  Start: 9-Aug-2021    levothyroxine (SYNTHROID, LEVOXYL) 25 mcg, oral, Every morning    losartan (COZAAR) 100 mg, oral, Daily    propranolol (INDERAL) 10 mg, oral, 2 times daily    rizatriptan MLT (MAXALT-MLT) 10 mg, oral, Once as needed, May repeat once in 2 hours if needed (Maximum 2 tablets in 24 hours)    rosuvastatin (CRESTOR) 20 mg, oral, Daily       Last Recorded Vitals  Blood pressure 155/76, pulse 65,  "temperature 36.2 °C (97.2 °F), temperature source Temporal, resp. rate 18, height 1.702 m (5' 7.01\"), weight 102 kg (225 lb 15.5 oz).      Physical Exam  Chest:       Patient is alert and oriented x3 and in a relaxed and appropriate mood. Her gait is steady and hand grasps are equal. Sclera is clear. The breasts are nearly symmetrical. Right breast subareolar 11:00, 3.0 x 2.0 cm firm mass and nipple flattened. The left breast  tissue is soft without palpable abnormalities, discrete nodules or masses. The skin and left nipple appear normal. There is no cervical, supraclavicular or axillary lymphadenopathy. Heart rate and rhythm normal, S1 and S2 appreciated. The lungs are clear to auscultation bilaterally.      Relevant Results and Imaging  Study Result    Narrative & Impression   Interpreted By:  Maria Isabel Ruff,  and Walter Isaac   STUDY:  BI US BREAST LIMITED RIGHT; BI MAMMO RIGHT DIAGNOSTIC TOMOSYNTHESIS;  2/19/2024 2:02 pm; 2/19/2024 1:16 pm      ACCESSION NUMBER(S):  [id number]; [id number]      ORDERING CLINICIAN:  LAYO SCHWARTZ      INDICATION:  Palpable subareolar right breast mass. Family history of breast  cancer.      COMPARISON:  06/08/2023 and 09/20/2021.      FINDINGS:  MAMMOGRAPHY: 2D and tomosynthesis images were reviewed at 1 mm slice  thickness.      Density:  The breast tissue is heterogeneously dense, which may  obscure small masses.      A radiopaque marker was placed on the skin at the site of the  patient's area of concern in the subareolar right breast.      A masslike focal asymmetry with spiculations is noted in central  breast at mid depth, approximately correlating with the patient's  palpable abnormality. The nipple is mildly retracted.      ULTRASOUND:  Targeted subareolar and right axillary ultrasound was  performed. An irregular heterogeneous mass is seen in the subareolar  region at the 11 o'clock position. It measures 2.9 x 2.3 x 1.9 cm.  The mass is hard on elastography and " demonstrates internal  vascularity. Four morphologically normal right axillary lymph nodes  are noted.      IMPRESSION:  Suspicious right breast mass without axillary adenopathy. A surgical  consultation for an ultrasound-guided biopsy is recommended. A  pre-procedure form has been completed.      Dr. Marlin Watson explained the findings and recommendations  to the patient at the time of exam. A message was sent to the  referring practitioner at the time of this dictation regarding these  findings using the epic critical findings reporting system.      Method of Detection: Category Pat - Patient Reported Self-examination  Finding      BI-RADS CATEGORY:      BI-RADS Category:  4 Suspicious.  Recommendation:  Recommendations as Above.  Recommended Date:  Immediate.  Laterality:  Right.      For any future breast imaging appointments, please call [phone] ([phone]).      I personally reviewed the images/study and Dr. Watson's  interpretation and I agree with the findings as stated. This study  was interpreted at [place], [place].      MACRO:  Critical Finding:  See findings. Notification was initiated on  2/19/2024 at 2:54 pm by Dr. Maria Isabel Ruff.  (**-YCF-**) Instructions:  See Impression for specific recommendations.      Signed by: Maria Isabel Ruff 2/19/2024 2:55 PM     I explained the results in depth, along with suggested explanation for follow up recommendations based on the testing results. BI-RADS Category 4    Visit Diagnosis  1. Abnormal finding on breast imaging        2. Mass of right breast, unspecified quadrant            Assessment/Plan  Abnormal mammogram, right breast mass, no breast surgery or biopsy, family history of breast cancer, heterogeneously dense    Plan:  Right breast ultrasound guided core biopsy.    Patient Discussion/Summary  Proceed to biopsy. A breast radiology physician will perform the biopsy. Results are usually available in  about 7 business days. I will call patient with results and instruct on next steps and plan.     IMPORTANT INFORMATION REGARDING YOUR RESULTS    If you receive medical information from My [facility] Personal Health Record (online chart) your results will be released into your chart. This means you may view or see results of your biopsy or procedure before I contact you directly. If this occurs, please call the office and we will discuss your results over the phone.    You can see your health information, review clinical summaries from office visits & test results online when you follow your health with MY  Chart, a personal health record. To sign up go to [email or web address]. If you need assistance with signing up or trouble getting into your account call [employer] Patient Line 24/7 at [phone].    My office phone number is [phone]  if you need to get in touch with me or have additional questions or concerns. Thank you for choosing [facility] and trusting me as your healthcare provider. I look forward to seeing you again at your next office visit. I am honored to be a provider on your health care team and I remain dedicated to helping you achieve your health goals.       Viviana Corea, APRN-CNP

## 2024-02-28 RX ORDER — LEVOTHYROXINE SODIUM 25 UG/1
25 TABLET ORAL EVERY MORNING
Qty: 90 TABLET | Refills: 1 | Status: SHIPPED | OUTPATIENT
Start: 2024-02-28

## 2024-02-28 RX ORDER — AMLODIPINE BESYLATE 10 MG/1
10 TABLET ORAL DAILY
Qty: 90 TABLET | Refills: 1 | Status: SHIPPED | OUTPATIENT
Start: 2024-02-28

## 2024-02-28 RX ORDER — ROSUVASTATIN CALCIUM 20 MG/1
20 TABLET, COATED ORAL DAILY
Qty: 90 TABLET | Refills: 1 | Status: SHIPPED | OUTPATIENT
Start: 2024-02-28

## 2024-02-29 ENCOUNTER — OFFICE VISIT (OUTPATIENT)
Dept: SURGICAL ONCOLOGY | Facility: HOSPITAL | Age: 59
End: 2024-02-29
Payer: COMMERCIAL

## 2024-02-29 VITALS
BODY MASS INDEX: 35.47 KG/M2 | HEART RATE: 65 BPM | SYSTOLIC BLOOD PRESSURE: 155 MMHG | RESPIRATION RATE: 18 BRPM | WEIGHT: 225.97 LBS | TEMPERATURE: 97.2 F | DIASTOLIC BLOOD PRESSURE: 76 MMHG | HEIGHT: 67 IN

## 2024-02-29 DIAGNOSIS — N63.10 MASS OF RIGHT BREAST, UNSPECIFIED QUADRANT: ICD-10-CM

## 2024-02-29 DIAGNOSIS — R92.8 ABNORMAL FINDING ON BREAST IMAGING: Primary | ICD-10-CM

## 2024-02-29 PROCEDURE — 99214 OFFICE O/P EST MOD 30 MIN: CPT | Performed by: NURSE PRACTITIONER

## 2024-02-29 PROCEDURE — 3077F SYST BP >= 140 MM HG: CPT | Performed by: NURSE PRACTITIONER

## 2024-02-29 PROCEDURE — 99204 OFFICE O/P NEW MOD 45 MIN: CPT | Performed by: NURSE PRACTITIONER

## 2024-02-29 PROCEDURE — 1036F TOBACCO NON-USER: CPT | Performed by: NURSE PRACTITIONER

## 2024-02-29 PROCEDURE — 3078F DIAST BP <80 MM HG: CPT | Performed by: NURSE PRACTITIONER

## 2024-02-29 ASSESSMENT — PAIN SCALES - GENERAL: PAINLEVEL: 0-NO PAIN

## 2024-03-01 ENCOUNTER — TELEPHONE (OUTPATIENT)
Dept: RADIOLOGY | Facility: HOSPITAL | Age: 59
End: 2024-03-01
Payer: COMMERCIAL

## 2024-03-01 ENCOUNTER — HOSPITAL ENCOUNTER (OUTPATIENT)
Dept: RADIOLOGY | Facility: HOSPITAL | Age: 59
Discharge: HOME | End: 2024-03-01
Payer: COMMERCIAL

## 2024-03-01 DIAGNOSIS — N63.10 MASS OF RIGHT BREAST, UNSPECIFIED QUADRANT: ICD-10-CM

## 2024-03-01 DIAGNOSIS — R92.8 ABNORMAL FINDING ON BREAST IMAGING: ICD-10-CM

## 2024-03-01 DIAGNOSIS — R92.8 OTHER ABNORMAL AND INCONCLUSIVE FINDINGS ON DIAGNOSTIC IMAGING OF BREAST: ICD-10-CM

## 2024-03-01 PROCEDURE — 19083 BX BREAST 1ST LESION US IMAG: CPT | Mod: RIGHT SIDE | Performed by: RADIOLOGY

## 2024-03-01 PROCEDURE — 19083 BX BREAST 1ST LESION US IMAG: CPT | Mod: RT

## 2024-03-01 PROCEDURE — 2720000007 HC OR 272 NO HCPCS

## 2024-03-01 PROCEDURE — 2500000005 HC RX 250 GENERAL PHARMACY W/O HCPCS: Performed by: RADIOLOGY

## 2024-03-01 PROCEDURE — 88360 TUMOR IMMUNOHISTOCHEM/MANUAL: CPT | Mod: TC,SUR | Performed by: NURSE PRACTITIONER

## 2024-03-01 PROCEDURE — 77065 DX MAMMO INCL CAD UNI: CPT | Mod: RIGHT SIDE | Performed by: RADIOLOGY

## 2024-03-01 PROCEDURE — C1819 TISSUE LOCALIZATION-EXCISION: HCPCS

## 2024-03-01 PROCEDURE — 88342 IMHCHEM/IMCYTCHM 1ST ANTB: CPT | Performed by: PATHOLOGY

## 2024-03-01 PROCEDURE — A4648 IMPLANTABLE TISSUE MARKER: HCPCS

## 2024-03-01 PROCEDURE — 88305 TISSUE EXAM BY PATHOLOGIST: CPT | Performed by: PATHOLOGY

## 2024-03-01 PROCEDURE — 88360 TUMOR IMMUNOHISTOCHEM/MANUAL: CPT | Performed by: PATHOLOGY

## 2024-03-01 PROCEDURE — 2780000003 HC OR 278 NO HCPCS

## 2024-03-01 PROCEDURE — 77065 DX MAMMO INCL CAD UNI: CPT | Mod: RT

## 2024-03-01 RX ADMIN — Medication 21 ML: at 08:54

## 2024-03-01 ASSESSMENT — PAIN - FUNCTIONAL ASSESSMENT: PAIN_FUNCTIONAL_ASSESSMENT: 0-10

## 2024-03-01 ASSESSMENT — PAIN SCALES - GENERAL: PAINLEVEL_OUTOF10: 0 - NO PAIN

## 2024-03-01 NOTE — TELEPHONE ENCOUNTER
Spoke with pt about date, time location and parking, aware it is okay to eat drink and drive as long as not taking sedating medications or having another appointment with different instructions, reviewed pre, post and discharge instructions, instructed to wear most supportive bra and tylenol for discomfort. Verbalizing understanding , no questions allergies reviewed, stopped ASA 81 mg, okay with lidocaine,

## 2024-03-01 NOTE — Clinical Note
Reviewed procedure, allergies, no pain, history of fall no injury, no fear of falling, stopped 81mg ASA 3 days ago, no problems with lidocaine, feels safe at home, consent completed, area scanned and marked, cleansed lidocaine given by Dr. Tom, biopsy completed, pain 5/10 , pressure held for 10 min, no bleeding steri strips applied, clip films completed and okayed, reviewed discharge instructions given pamphlet and card for Viviana Corea and nurse line, verbalizing understanding no questions, demonstrated ice placement, pain at discharge 3/10

## 2024-03-06 ENCOUNTER — TELEPHONE (OUTPATIENT)
Dept: SURGICAL ONCOLOGY | Facility: HOSPITAL | Age: 59
End: 2024-03-06
Payer: COMMERCIAL

## 2024-03-06 LAB
LAB AP ASR DISCLAIMER: NORMAL
LABORATORY COMMENT REPORT: NORMAL
PATH REPORT.ADDENDUM SPEC: NORMAL
PATH REPORT.FINAL DX SPEC: NORMAL
PATH REPORT.GROSS SPEC: NORMAL
PATH REPORT.RELEVANT HX SPEC: NORMAL
PATH REPORT.TOTAL CANCER: NORMAL

## 2024-03-06 NOTE — TELEPHONE ENCOUNTER
Result Communication    Spoke with Scott Leung regarding breast biopsy results showing cancer. Office will call to schedule a surgical consultation.    Resulted Orders   Surgical Pathology Exam   Result Value Ref Range    Case Report       Surgical Pathology                                Case: [id number]                                  Authorizing Provider:  GINA Monae    Collected:           2024 0830              Ordering Location:     [facility]       Received:            2024 1415                                     Center                                                                       Pathologist:           Tejal Heath MD                                                       Specimen:    BREAST CORE BIOPSY RIGHT, RIGHT breast subareolar                                          FINAL DIAGNOSIS       A. Right breast subareolar, ultrasound guided core needle biopsy:      -- Invasive lobular carcinoma, grade 2, see note.  -- Atypical lobular hyperplasia.     Note:  In this limited sample, the invasive carcinoma measures up to 17 mm in greatest dimension. E-cadherin immunostain show negative/reduced expression in tumor cells and support lobular phenotype.  ER, NE and HER2 will be reported in an addendum.     : Dr Bárbara Rios                By the signature on this report, the individual or group listed as making the Final Interpretation/Diagnosis certifies that they have reviewed this case.       Addendum       Surgical/Block Number: [id number]  Specimen Site: Right breast subareolar   Specimen Type: ultrasound guided core needle biopsy    Estrogen Receptor (clone SP1):  Positive         Percentage with nuclear stainin%        Intensity of staining: Moderate to strong     Progesterone Receptor (clone SP2):  Negative  Percentage with nuclear staining: < 1%        Intensity of staining: weak    HER2 (clone 4B5):    Negative  (1+)            Comment:   Internal positive controls were identified in this specimen.   ASCO/CAP guidelines for ischemic times are met for this sample.  : Dr Bárbara Rios      For ER: Ranges for interpretation: Invasive carcinoma cells exhibiting greater than or equal to 10% nuclear staining are considered POSITIVE. Invasive carcinoma cells exhibiting less than 10%, but greater than or equal to 1% are considered LOW POSITIVE. Invasive carcinoma cells exhibiting less than 1% staining are considered NEGATIVE. (Reference: Arch Pathol Lab Med. doi:10.5858/arpa. [id number]) The stated steroid receptor activity for ER was derived from rabbit monoclonal antibody staining (clone SP1) on formalin fixed, paraffin embedded specimens, unless otherwise noted.  Each assay is performed using appropriate positive and negative internal controls.    For NV: Ranges for interpretation: Invasive carcinoma cells exhibiting greater than or equal to 1% nuclear staining are considered POSITIVE.  Invasive carcinoma cells exhibiting less than 1% staining are considered NEGATIVE. (Reference: Arch Pathol Lab Med. doi:10.5858/arpa. [id number]) The stated steroid receptor activity for NV was derived from rabbit monoclonal antibody staining (clone 1E2) on formalin fixed, paraffin embedded specimens, unless otherwise noted.  The method employed was a standard peroxidase labeled polymer detection system. Each assay is performed using appropriate positive and negative internal controls.    For HER2: Ranges for interpretation: POSITIVE (3+): greater than or equal to 10% tumor cells with intense and uniform staining; EQUIVOCAL (2+): weak to moderate complete immunoreactivity in >10% of tumor cells or circumferential intense staining in less than or equal to 10% of cells; and NEGATIVE (1+): Faint weak immunoreactivity in >10% of tumor cells, but only a portion of the membrane is positive; NEGATIVE (0):No immunoreactivity or  immunoreactivity in less than or equal to 10% of tumor cells. All tests are performed using a [place] [employer] HER-2/marbella (4B5) rabbit monoclonal primary antibody on formalin fixed, paraffin embedded tissue, unless otherwise noted. Only invasive carcinoma is evaluated using the ASCO/CAP scoring system (Arch Pathol Lab Med 2018; 142: 4521-7512) unless otherwise specified.  External cell culture and tissue controls stain appropriately.       Clinical History       Ultrasound guided core biopsy RIGHT breast subareolar      Gross Description       Received in formalin, labeled with the patient´s name and hospital number, are multiple irregular/cylindrical segments of yellow-white fatty soft tissue aggregating to 2.0 x 0.4 x 0.2 cm.  The specimen is submitted in toto in 1 cassette .  AE    NOTE:  Ischemia time: Time and date  not specified.  This specimen was placed into formalin at: Time and date not specified.      Disclaimer       One or more of the reagents used to perform assays on this specimen MAY have contained components considered to be analyte specific reagents (ASR's).  ASR's have not been cleared or approved by the U.S. Food and Drug Administration.  These assays were developed and their performance characteristics determined by the Department of Pathology at [facility]. The FDA does not require this test to go through premarket FDA review. This test is used for clinical purposes. It should not be regarded as investigational or for research. This laboratory is certified under the Clinical Laboratory Improvement Amendments (CLIA) as qualified to perform high complexity clinical laboratory testing.  The assays were performed with appropriate positive and negative controls which stained appropriately.         3:10 PM

## 2024-03-09 PROBLEM — R92.8 ABNORMAL FINDING ON BREAST IMAGING: Status: RESOLVED | Noted: 2024-02-27 | Resolved: 2024-03-09

## 2024-03-09 PROBLEM — C50.111 MALIGNANT NEOPLASM OF CENTRAL PORTION OF RIGHT BREAST IN FEMALE, ESTROGEN RECEPTOR POSITIVE (MULTI): Status: ACTIVE | Noted: 2024-03-09

## 2024-03-09 PROBLEM — Z17.0 MALIGNANT NEOPLASM OF CENTRAL PORTION OF RIGHT BREAST IN FEMALE, ESTROGEN RECEPTOR POSITIVE (MULTI): Status: ACTIVE | Noted: 2024-03-09

## 2024-03-09 PROBLEM — N63.10 MASS OF RIGHT BREAST: Status: RESOLVED | Noted: 2024-02-27 | Resolved: 2024-03-09

## 2024-03-09 NOTE — PROGRESS NOTES
Subjective     Diagnosis date: 3/1/24 right breast invasive lobular carcinoma, grade 2, ER >95% 3+, W[place] 0%, HER2 1+, cT2N0, stage IIA     Cancer Staging   Malignant neoplasm of central portion of right breast in female, estrogen receptor positive (CMS/HCC)  Staging form: Breast, AJCC 8th Edition  - Clinical stage from 3/1/2024: Stage IIA - Signed by Minal Aaron MD on 3/9/2024  cT2  cN0  cM0  [place] grade: G2  ER Status: Positive  W[place] Status: Negative  HER2 Status: Negative       Scott Leung is a 59 y.o. female who was referred by: Dr. Emilia Angeles  for a right palpable breast cancer. She presents to the [facility] for treatment recommendations. She proceeded to have diagnostic imaging demonstrating at central/subareolar position there is a 2.9 cm irregular, nonparallel spiculated mass.  The area underwent US-guided core biopsy revealing a diagnosis of invasive lobular carcinoma. She denies skin changes or nipple discharge. She has a family history of breast cancer in some paternal cousins.    BREAST IMAGIN2024 Right diagnostic mammogram with ultrasound, indicates BI-RADS Category 4. Heterogeneously dense breast tissue. Suspicious right breast mass without axillary adenopathy. A surgical consultation for an ultrasound-guided biopsy is recommended.     BREAST PROCEDURE: 3/1/24 right breast, subareolar, ultrasound-guided core biopsy.     REPRODUCTIVE HISTORY: menarche age 12 [place], , first birth age 22, , no  OCP's, natural menopause age 44, history right oophorectomy, HRT less than a year     FAMILY CANCER HISTORY:   Paternal Cousin (1): Breast cancer, age 40 (BRCA positive)  Paternal Cousin (2): Breast cancer, late 50's (negative genetics)  Paternal Cousin (3): Breast cancer, 60's (negative genetics)  Father: Liver cancer, age 40  Mother: Bone cancer, age 70    MEDICAL HISTORY: hypothyroid, hyperlipidemia, hypertension    MEDICAL ONCOLOGY: referral post op    RADIATION ONCOLOGY:  "referral post op    GENETICS: meets NCCN criteria    Cancer-related family history includes Colon cancer in her sister; Liver cancer in her father.    Review of Systems   Constitutional: Negative.    HENT: Negative.     Eyes: Negative.    Respiratory: Negative.     Cardiovascular: Negative.    Gastrointestinal: Negative.    Endocrine: Negative.    Genitourinary: Negative.    Musculoskeletal: Negative.    Skin: Negative.    Neurological: Negative.    Hematological: Negative.    Psychiatric/Behavioral: Negative.          Objective     Visit Vitals  BP (!) 153/108   Pulse 70   Ht 1.702 m (5' 7\")   Wt 103 kg (226 lb)   BMI 35.40 kg/m²   Smoking Status Never   BSA 2.21 m²        Breast: Exam performed in sitting and supine positions.   cup size: B  RIGHT BREAST EXAM:  Breast: central mass with nipple tethering measuring 3cm  Skin Erythema: no  Attachment of Overlying Skin: no  Peau d' orange: no  Chest Wall Attachment: no  Nipple Inversion: no  Nipple Discharge: no     LEFT BREAST EXAM:  Breast: no discrete mass  Skin Erythema: no  Peau d' orange: no  Nipple Inversion: no  Nipple Discharge: no     RIGHT SUN BASIN EXAM:  Axillary: negative  Infraclavicular: negative  Supraclavicular: negative     LEFT SUN BASIN EXAM:  Axillary: negative  Infraclavicular: negative  Supraclavicular: negative        General: Otherwise healthy appearing. Patient is in no acute distress.     HEENT: Conjunctivae are well colored and sclerae nonicteric. Neck is supple, trachea midline. No lymphadenopathy or thyromegaly.     Chest: Respiratory rate and effort normal. No cough.     CV: regular rate and rhythm.     Abdomen: Abdomen is non-distended, non-tender to palpation.     Extremities: Extremities are atraumatic. There is no edema. Full ROM.     Neurologic: Neurologically intact. Alert and oriented.        Imaging    Images from bilateral diagnostic mammogram and ultrasound were reviewed with breast imaging and images were shared with MsDebbie " Xochitl today.    Assessment and Plan    The natural history and evolution of breast cancer were discussed with Ms. Leung and her family (both Laura), including the difference between in-situ and invasive carcinoma, and the distinction between local and systemic disease and local and systemic therapy. For local treatment options, I explained the risks and benefits of breast conservation and mastectomy (with or without reconstruction), including the fact that survival rates are equal with these two approaches. If breast conservation is elected, I explained the need for free margins, the possibility of re-excision to achieve free margins, and the need for post-operative radiotherapy. The approach to kathie staging was also described, including the technique, risks and benefits of sentinel node dissection and the long-term sequelae of this procedure including lymphedema risks. With regard to systemic therapy, final recommendation will be made following receipt of final surgical pathology and possibly genomic testing, but I outlined the possibility of endocrine therapy and less likely chemotherapy.    Ms. Leung meets NCCN criteria for genetic testing. I discussed the purpose of obtaining this information would be to help us determine the risk of future breast cancers, but it does not tell us anything about the behavior of the cancer she has today. She understands approximately 5-10% of all breast cancers have a genetic component, so the most likely outcome from testing would be negative for a pathogenic variant. Should she be positive and at elevated risk for future breast cancer, we could discuss the value of bilateral mastectomy to address her current breast cancer diagnosis and for risk reduction of future breast cancers. She would like this information to make decisions about surgery, so this will be requested urgently    The indications, risks, and benefits of MRI evaluation were discussed, including the greater  sensitivity of MRI at the cost of a high false positive rate, leading to additional imaging procedures and possible unnecessary biopsy. Also the lack of evidence ie improvement of long or short-term outcomes, and the fact that women undergoing MRI tend to have larger resections, and it is unknown if these are necessary. After this discussion we decided to proceed with breast MRI at this time. I will contact her when these results are available.    From a surgery standpoint, Ms. Leung is not a good candidate for lumpectomy unfortunately. I have recommended mastectomy. She would be interested in immediate reconstruction. I will refer her to plastic surgery to discuss her reconstruction options. Axillary staging will be performed with sentinel lymph node biopsy Referrals will be placed to medical oncology and radiation oncology, if indicated, post op. She inquired about postponing surgery until after her son's wedding in May. While this is not a significant delay, I have asked her to consider starting an aromatase inhibitor now, because of this anticipated delay. She does not have a diagnosis of osteoporosis or osteopenia. I have sent an rx for anastrazole to her pharmacy.    Following this discussion, where all of the patient's questions were answered, we agreed to proceed with right skin sparing mastectomy, intraoperative lymphatic mapping, axillary sentinel lymph node biopsy followed by immediate reconstruction. Informed consent was obtained in clinic today and surgery will be tentatively scheduled at [facility] for early June 2024. Chronic co-morbidities that can increase surgical complications (hypertension, hyperlipidemia, hypothyroid) were reviewed and noted; these will continue to be managed by her PCP and respective specialists.     Minal Aaron MD

## 2024-03-11 ASSESSMENT — ENCOUNTER SYMPTOMS
HEMATOLOGIC/LYMPHATIC NEGATIVE: 1
CONSTITUTIONAL NEGATIVE: 1
GASTROINTESTINAL NEGATIVE: 1
NEUROLOGICAL NEGATIVE: 1
ENDOCRINE NEGATIVE: 1
EYES NEGATIVE: 1
PSYCHIATRIC NEGATIVE: 1
RESPIRATORY NEGATIVE: 1
CARDIOVASCULAR NEGATIVE: 1
MUSCULOSKELETAL NEGATIVE: 1

## 2024-03-12 ENCOUNTER — OFFICE VISIT (OUTPATIENT)
Dept: SURGICAL ONCOLOGY | Facility: CLINIC | Age: 59
End: 2024-03-12
Payer: COMMERCIAL

## 2024-03-12 VITALS
HEIGHT: 67 IN | DIASTOLIC BLOOD PRESSURE: 108 MMHG | WEIGHT: 226 LBS | BODY MASS INDEX: 35.47 KG/M2 | SYSTOLIC BLOOD PRESSURE: 153 MMHG | HEART RATE: 70 BPM

## 2024-03-12 DIAGNOSIS — Z17.0 MALIGNANT NEOPLASM OF CENTRAL PORTION OF RIGHT BREAST IN FEMALE, ESTROGEN RECEPTOR POSITIVE (MULTI): Primary | ICD-10-CM

## 2024-03-12 DIAGNOSIS — Z17.0 MALIGNANT NEOPLASM OF CENTRAL PORTION OF RIGHT BREAST IN FEMALE, ESTROGEN RECEPTOR POSITIVE (MULTI): ICD-10-CM

## 2024-03-12 DIAGNOSIS — C50.111 MALIGNANT NEOPLASM OF CENTRAL PORTION OF RIGHT BREAST IN FEMALE, ESTROGEN RECEPTOR POSITIVE (MULTI): ICD-10-CM

## 2024-03-12 DIAGNOSIS — C50.111 MALIGNANT NEOPLASM OF CENTRAL PORTION OF RIGHT BREAST IN FEMALE, ESTROGEN RECEPTOR POSITIVE (MULTI): Primary | ICD-10-CM

## 2024-03-12 PROCEDURE — 1036F TOBACCO NON-USER: CPT | Performed by: SURGERY

## 2024-03-12 PROCEDURE — 99215 OFFICE O/P EST HI 40 MIN: CPT | Performed by: SURGERY

## 2024-03-12 PROCEDURE — 3077F SYST BP >= 140 MM HG: CPT | Performed by: SURGERY

## 2024-03-12 PROCEDURE — 3080F DIAST BP >= 90 MM HG: CPT | Performed by: SURGERY

## 2024-03-12 RX ORDER — CEFAZOLIN SODIUM 2 G/100ML
2 INJECTION, SOLUTION INTRAVENOUS ONCE
OUTPATIENT
Start: 2024-03-12 | End: 2024-03-12

## 2024-03-12 RX ORDER — ACETAMINOPHEN 325 MG/1
975 TABLET ORAL ONCE
Status: CANCELLED | OUTPATIENT
Start: 2024-03-12 | End: 2024-03-12

## 2024-03-12 RX ORDER — ANASTROZOLE 1 MG/1
1 TABLET ORAL DAILY
Qty: 90 TABLET | Refills: 0 | Status: SHIPPED | OUTPATIENT
Start: 2024-03-12 | End: 2024-06-10

## 2024-03-12 RX ORDER — SODIUM CHLORIDE, SODIUM LACTATE, POTASSIUM CHLORIDE, CALCIUM CHLORIDE 600; 310; 30; 20 MG/100ML; MG/100ML; MG/100ML; MG/100ML
50 INJECTION, SOLUTION INTRAVENOUS CONTINUOUS
Status: CANCELLED | OUTPATIENT
Start: 2024-03-12

## 2024-03-12 ASSESSMENT — PAIN SCALES - GENERAL: PAINLEVEL: 0-NO PAIN

## 2024-03-13 ENCOUNTER — APPOINTMENT (OUTPATIENT)
Dept: RADIOLOGY | Facility: CLINIC | Age: 59
End: 2024-03-13
Payer: COMMERCIAL

## 2024-03-13 ENCOUNTER — APPOINTMENT (OUTPATIENT)
Dept: SURGICAL ONCOLOGY | Facility: CLINIC | Age: 59
End: 2024-03-13
Payer: COMMERCIAL

## 2024-03-13 ENCOUNTER — APPOINTMENT (OUTPATIENT)
Dept: RADIOLOGY | Facility: HOSPITAL | Age: 59
End: 2024-03-13
Payer: COMMERCIAL

## 2024-03-15 ENCOUNTER — APPOINTMENT (OUTPATIENT)
Dept: RADIOLOGY | Facility: HOSPITAL | Age: 59
End: 2024-03-15
Payer: COMMERCIAL

## 2024-03-21 ENCOUNTER — HOSPITAL ENCOUNTER (OUTPATIENT)
Dept: RADIOLOGY | Facility: HOSPITAL | Age: 59
Discharge: HOME | End: 2024-03-21
Payer: COMMERCIAL

## 2024-03-21 DIAGNOSIS — Z17.0 MALIGNANT NEOPLASM OF CENTRAL PORTION OF RIGHT BREAST IN FEMALE, ESTROGEN RECEPTOR POSITIVE (MULTI): ICD-10-CM

## 2024-03-21 DIAGNOSIS — C50.111 MALIGNANT NEOPLASM OF CENTRAL PORTION OF RIGHT BREAST IN FEMALE, ESTROGEN RECEPTOR POSITIVE (MULTI): ICD-10-CM

## 2024-03-21 PROCEDURE — 77049 MRI BREAST C-+ W/CAD BI: CPT | Performed by: RADIOLOGY

## 2024-03-21 PROCEDURE — 2550000001 HC RX 255 CONTRASTS: Performed by: SURGERY

## 2024-03-21 PROCEDURE — A9575 INJ GADOTERATE MEGLUMI 0.1ML: HCPCS | Performed by: SURGERY

## 2024-03-21 PROCEDURE — 77049 MRI BREAST C-+ W/CAD BI: CPT

## 2024-03-21 RX ORDER — GADOTERATE MEGLUMINE 376.9 MG/ML
20 INJECTION INTRAVENOUS
Status: COMPLETED | OUTPATIENT
Start: 2024-03-21 | End: 2024-03-21

## 2024-03-21 RX ADMIN — GADOTERATE MEGLUMINE 18 ML: 376.9 INJECTION INTRAVENOUS at 19:51

## 2024-03-25 ENCOUNTER — TELEPHONE (OUTPATIENT)
Dept: SURGICAL ONCOLOGY | Facility: CLINIC | Age: 59
End: 2024-03-25
Payer: COMMERCIAL

## 2024-03-25 NOTE — TELEPHONE ENCOUNTER
Telephoned patient with recent breast MRI results; she is aware that MRI notes the previously biopsied area in right breast. No other areas of suspicious mass or NME noted in right breast. Left breast benign. She has upcoming visits with genetics on 3/27 and Plastic surgery on 4/2.

## 2024-03-27 ENCOUNTER — LAB (OUTPATIENT)
Dept: LAB | Facility: LAB | Age: 59
End: 2024-03-27
Payer: COMMERCIAL

## 2024-03-27 ENCOUNTER — CLINICAL SUPPORT (OUTPATIENT)
Dept: GENETICS | Facility: CLINIC | Age: 59
End: 2024-03-27
Payer: COMMERCIAL

## 2024-03-27 VITALS
DIASTOLIC BLOOD PRESSURE: 80 MMHG | WEIGHT: 229 LBS | RESPIRATION RATE: 18 BRPM | TEMPERATURE: 98 F | HEIGHT: 67 IN | HEART RATE: 62 BPM | SYSTOLIC BLOOD PRESSURE: 145 MMHG | BODY MASS INDEX: 35.94 KG/M2

## 2024-03-27 DIAGNOSIS — Z80.3 FAMILY HISTORY OF BREAST CANCER: ICD-10-CM

## 2024-03-27 DIAGNOSIS — Z84.81 FHX: GENETIC DISEASE CARRIER: ICD-10-CM

## 2024-03-27 DIAGNOSIS — Z13.71 ENCOUNTER FOR NONPROCREATIVE GENETIC COUNSELING AND TESTING: Primary | ICD-10-CM

## 2024-03-27 DIAGNOSIS — C50.111 MALIGNANT NEOPLASM OF CENTRAL PORTION OF RIGHT BREAST IN FEMALE, ESTROGEN RECEPTOR POSITIVE (MULTI): ICD-10-CM

## 2024-03-27 DIAGNOSIS — Z17.0 MALIGNANT NEOPLASM OF CENTRAL PORTION OF RIGHT BREAST IN FEMALE, ESTROGEN RECEPTOR POSITIVE (MULTI): ICD-10-CM

## 2024-03-27 DIAGNOSIS — Z71.83 ENCOUNTER FOR NONPROCREATIVE GENETIC COUNSELING AND TESTING: Primary | ICD-10-CM

## 2024-03-27 LAB
ALBUMIN SERPL BCP-MCNC: 4.5 G/DL (ref 3.4–5)
ALP SERPL-CCNC: 77 U/L (ref 33–136)
ALT SERPL W P-5'-P-CCNC: 47 U/L (ref 7–45)
ANION GAP SERPL CALC-SCNC: 14 MMOL/L (ref 10–20)
AST SERPL W P-5'-P-CCNC: 28 U/L (ref 9–39)
BILIRUB SERPL-MCNC: 0.6 MG/DL (ref 0–1.2)
BUN SERPL-MCNC: 20 MG/DL (ref 6–23)
CALCIUM SERPL-MCNC: 9.8 MG/DL (ref 8.6–10.6)
CHLORIDE SERPL-SCNC: 106 MMOL/L (ref 98–107)
CO2 SERPL-SCNC: 26 MMOL/L (ref 21–32)
CREAT SERPL-MCNC: 0.76 MG/DL (ref 0.5–1.05)
EGFRCR SERPLBLD CKD-EPI 2021: 90 ML/MIN/1.73M*2
ERYTHROCYTE [DISTWIDTH] IN BLOOD BY AUTOMATED COUNT: 12.9 % (ref 11.5–14.5)
GLUCOSE SERPL-MCNC: 115 MG/DL (ref 74–99)
HCT VFR BLD AUTO: 39.7 % (ref 36–46)
HGB BLD-MCNC: 13.9 G/DL (ref 12–16)
MCH RBC QN AUTO: 31.2 PG (ref 26–34)
MCHC RBC AUTO-ENTMCNC: 35 G/DL (ref 32–36)
MCV RBC AUTO: 89 FL (ref 80–100)
NRBC BLD-RTO: 0 /100 WBCS (ref 0–0)
PLATELET # BLD AUTO: 321 X10*3/UL (ref 150–450)
POTASSIUM SERPL-SCNC: 4.2 MMOL/L (ref 3.5–5.3)
PROT SERPL-MCNC: 7.1 G/DL (ref 6.4–8.2)
RBC # BLD AUTO: 4.46 X10*6/UL (ref 4–5.2)
SODIUM SERPL-SCNC: 142 MMOL/L (ref 136–145)
WBC # BLD AUTO: 8.9 X10*3/UL (ref 4.4–11.3)

## 2024-03-27 PROCEDURE — 96040 PR MEDICAL GENETICS COUNSELING EACH 30 MINUTES: CPT | Performed by: GENETIC COUNSELOR, MS

## 2024-03-27 PROCEDURE — 80053 COMPREHEN METABOLIC PANEL: CPT

## 2024-03-27 PROCEDURE — 85027 COMPLETE CBC AUTOMATED: CPT

## 2024-03-27 PROCEDURE — 36415 COLL VENOUS BLD VENIPUNCTURE: CPT

## 2024-03-27 ASSESSMENT — PATIENT HEALTH QUESTIONNAIRE - PHQ9
2. FEELING DOWN, DEPRESSED OR HOPELESS: NOT AT ALL
1. LITTLE INTEREST OR PLEASURE IN DOING THINGS: NOT AT ALL
SUM OF ALL RESPONSES TO PHQ9 QUESTIONS 1 & 2: 0

## 2024-03-27 NOTE — PROGRESS NOTES
History of Present Illness:  Scott Leung is a 59 y.o. female with a recent diagnosis of breast cancer and a family history of breast cancer, as well as a family history of both BRCA2 and WARREN pathogenic variants in paternal 1st cousins. Ms. Leung was referred to the Cancer Genetics Clinic at [facility] by her physician, Dr. Minal Aaron. Ms. Leung is interested in genetic testing to clarify their personal risk for cancer, as well as the risks to their family members.    Cancer Medical History:  Personal history of cancer? Yes.  Type: Breast (right).  Age at diagnosis: 59.  Summary: Right palpable breast cancer. Biopsy proven invasive lobular carcinoma, grade 2, ER+/CA-/Her2 1+, cT2N0, stage IIA. Current plan is for right breast skin sparing mastectomy, but patient would choose bilateral mastectomies if BRCA mutation positive. Surgery is scheduled for 6/10/2024.    History of other cancers: No.    Prior hereditary cancer genetic testing? No.    Cancer screening history:  Mammograms? Yes, see above. Also had breast MRI.  PAP smear? Yes, but not in a long time. No known history of abnormal Pap smears.  Colonoscopy? Yes, x2. Last 2017. Reports she has had a couple colon polyps removed. Because of sister's pathology (unsure if cancer or not), was told to repeat in 5 years.  Upper endoscopy? No.   Dermatology? No.   Other cancer screening? No.    Reproductive History:  Number of children: 3.  Number of pregnancies: 5.  Age first birth: 22.  Breast feeding? No.  Menarche (age): 12.  Menopause (age): 44.  OCP: Yes, for ~5+ years in her 20s.  HRT: Yes, <1 year.    Hysterectomy? No.  Oophorectomy? Unilateral oophorectomy due to ovarian cyst in late 40s/early 50s. One ovary intact.    Family history:  A 4-generation pedigree was obtained and was significant for the following:  -Patient, ER positive lobular breast cancer per the above;  -Mother, multiple myeloma in her 70s,  at 78;  -Father, liver cancer at  40, alcohol use, WWII exposures,  at 40;  -Paternal aunt, lung cancer, ;  -Paternal first cousin #1 (son of aunt above), throat cancer;  -Paternal first cousin #2 (daughter of aunt above), breast cancer at 53, found to have WARREN mutation [id number](splice donor) on [employer] testing;  -Paternal first cousin #3 (also daughter of aunt above), breast cancer at 60;  -Paternal first cousin #4 (also daughter of aunt above), breast cancer at 45;  -Paternal uncle, lung cancer,  at 50;  -Paternal first cousin #5 (daughter of uncle above), breast cancer at 43,  at 47, found to have BRCA2 mutation on [employer] testing BRCA2 [id number];  -Paternal first cousin #6 (also daughter of uncle above), no history of cancer, reportedly BRCA2 positive;  -Paternal first cousin once removed, no history of cancer, daughter cousin above, also BRCA2 positive ([employer] single-site testing);  -Paternal first cousin #7, breast cancer at 64;  -Paternal first cousin #8, breast cancer at 59, found to be an MUTYH carrier (MUTYH c.1187G>A) on 60-gene custom panel from [employer].    Ms. Leung is of Chinese/English (maternal) and [place] (paternal) ancestry.  There is no known Ashkenazi Pentecostalism ancestry or consanguinity.    Genetic counseling:  Ms. Leung is a 59 y.o. female with a recent diagnosis of breast cancer, as well as a family history of breast cancer concerning for hereditary breast cancer. She also has a paternal first cousin with a known BRCA2 mutation/pathogenic variant, and a different paternal 1st cousin with a known WARREN mutation/pathogenic variant. Based on the family history, she has a 12.5% chance to have the known BRCA2 mutation/pathogenic variant, and a separate 12/5% chance to have the known WARREN mutation/pathogenic variant.  There is also enough family history of breast cancer that is not explained by these two mutations that some of the family history could be hereditary breast cancer due to a different gene  mutation, such as in BRCA1 or PALB2.  Ms. Leung meets current national (NCCN) criteria for testing of high-penetrance breast cancer susceptibility genes, including BRCA1, BRCA2, CDH1, PALB2, PTEN, and TP53.  Testing is medically necessary, as it will help determine if Ms. Leung is a candidate for bilateral mastectomies as well as future risk-reducing USO (having the remaining ovary removed to prevent ovarian cancer and related cancers).    We reviewed genes and chromosomes, inherited forms of breast and ovarian cancer, and the BRCA1 and BRCA2 genes causing HBOC.  We discussed that most cancers are not due to an inherited genetic susceptibility.  However, in about 5-10% of families, there is an inherited genetic mutation that can make a person more susceptible to developing certain forms of cancer.  Within these families, we often see multiple family members with cancer, occurring in multiple generations.  In addition, earlier onset and bilateral cancers are suggestive of an inherited form of cancer.  Finally, there is a clustering of certain types of cancer in these families, such as breast and ovarian cancer.    We discussed the BRCA1 and BRCA2 genes, which are two genes that have been linked to early-onset breast and/or ovarian cancer.  Mutations in these genes are inherited in a dominant pattern and confer up to an 87% lifetime risk for breast cancer.  This is elevated compared to the general population risk of 10-12%.  In addition, BRCA1 and BRCA2 mutation carriers have up to a 45% lifetime risk for ovarian cancer, which is elevated over the 2% general population risk.  Mutation carriers who have already been diagnosed with cancer have an increased risk to develop a second, contralateral breast cancer.  BRCA2 gene mutation carriers have an increased risk for male breast cancer, prostate cancer, melanoma, gastric cancer, and pancreatic cancer.    We discussed that there are multiple genes associated with  increased breast and/or ovarian cancer risk. Some genes, like the BRCA genes are considered highly penetrant breast and ovarian cancer genes, meaning a mutation in the gene confers a high risk of breast and/or ovarian cancer. On the other hand, there are other intermediate (moderate risk) breast and ovarian cancer genes, such as WARREN. WARREN is a moderate risk gene associated with hereditary breast, pancreatic, and ovarian cancers. For many of the moderate risk genes, there is sometimes limited information regarding the degree to which a mutation in the gene affects risk of different types of cancers. Additionally, for some of these moderate risk genes, the appropriate management for individuals who have a mutation in one of these genes is not always clear. In many cases, even if an individual tests positive for a mutation in a moderate risk gene, recommendations are still based on the family history, not the positive test result.    Ms. Leung was counseled about hereditary cancer susceptibility including cancer risks, options for increased screening and/or risk reduction, genetic testing, and the implications for other family members.  We discussed performing testing for high-penetrance breast cancer susceptibility genes, ideally as part of a multi-gene panel.  We specifically discussed the [facility] CancerNext panel, which examines the following 34 genes:  APC, WARREN, AXIN2, BARD1, BRCA1, BRCA2, BRIP1, BMPR1A, CDH1, CDK4, CDKN2A, CHEK2, DICER1, EPCAM, GREM1, HOXB13, MLH1, MSH2, MSH3, MSH6, MUTYH, NF1, NTHL1, PALB2, PMS2, POLD1, POLE, PTEN, RAD51C, RAD51D, SMAD4, SMARCA4, STK11, TP53.    We discussed the Genetic Information Nondiscrimination Act (KEN). We discussed the protections and limitations of KEN. KEN generally makes in illegal for health insurance companies, group health plans, and most employers (with >15 employees) to discriminate based on genetic information. It does not protect against genetic discrimination  for life insurance, disability insurance, long-term care, or other insurances.    After a discussion about the risks, benefits, and limitations of genetic testing,  Ms. Leung elected to undergo genetic testing for hereditary cancer using the [facility] panel described above.  They gave their oral and written consent to proceed with testing.  Ms. Leung had her blood drawn today. Results are typically available within 3-4 weeks, and Ms. Leung will return to the Cancer Genetics Clinic to discuss her testing results.  At that time, we will make recommendations for both Ms. Leung and their family members in terms of cancer screening and/or cancer risk reduction options.    PLAN:  1. Ms. Leung elected to undergo genetic testing for hereditary cancer using the [facility] panel described above.  They gave their oral and written consent to proceed with testing.  Ms. Leung had her blood drawn today. Results are typically available within 3-4 weeks from the time of blood draw.    2.  Ms. Leung will return to the Cancer Genetics Clinic in approximately four weeks to discuss her test results.  A follow up appointment has been scheduled for Monday 4/22 at 2:30 pm, in person ([place]).    3. We remain available to Ms. Leung at [phone] if any questions arise regarding information discussed at today's visit. Doris can be reached directly at [phone].    Doris Fuentes MS, [facility] [place]  Genetic Counselor  [facility]  [phone]    Reviewed by:  Brigitte Weber MD  Clinical   [facility]  [phone]    Time spent with patient:  52 minutes (12:44-1:36 pm), in person.

## 2024-04-01 NOTE — PROGRESS NOTES
Plastic Surgery Clinic Visit  Breast Reconstruction    Patient Name: Scott Leung  MRN: [id number]  Date:  4/2/24     Subjective  Scott Leung is a 59 y.o. female diagnosed on 3/1/24 with right breast invasive lobular carcinoma, grade 2, ER >95% 3+, WA 0%, HER2 1+, cT2N0, stage IIA. Diagnostic imaging demonstrated at the central/subareolar position there is a 2.9 cm irregular, nonparallel spiculated mass. Dr. Aaron is planning for a right side skin sparing mastectomy on 6/10/24. She is here today for reconstruction options.    Currently planning for R mastectomy, possible bilateral mastectomy pending results of genetic testing. Pt is open to learning about autologous vs implant based reconstruction. She denies significant cardiac hx, hx of diabetes. Currently on baby aspirin every other day. Surgical hx significant for laparoscopic salpingectomy. Denies tobacco use.     Last Mammogram: 2/9/2024    Past Medical History:   Diagnosis Date    Abdominal adhesions 02/14/2024    Arthritis     Head injury 03/01/2023    Scoliosis 2021     Past Surgical History:   Procedure Laterality Date    INNER EAR SURGERY      KNEE ARTHROSCOPY W/ DEBRIDEMENT  10/09/2014    Knee Arthroscopy (Therapeutic)    OVARIAN CYST REMOVAL      TUBAL LIGATION      also had uterine ablation    ULNAR NERVE TRANSPOSITION      WISDOM TOOTH EXTRACTION  1985     Allergies   Allergen Reactions    Tetanus Vaccines And Toxoid Other    Latex Rash       Current Outpatient Medications:     amLODIPine (Norvasc) 10 mg tablet, TAKE 1 TABLET BY MOUTH EVERY DAY, Disp: 90 tablet, Rfl: 1    anastrozole (Arimidex) 1 mg tablet, Take 1 tablet (1 mg total) by mouth once daily.  Swallow whole with a drink of water., Disp: 90 tablet, Rfl: 0    ASPIRIN LOW-STRENGTH ORAL, Take 81 mg by mouth every other day., Disp: , Rfl:     cholecalciferol (Vitamin D-3) 25 MCG (1000 UT) capsule, Take 1 capsule (25 mcg) by mouth once daily., Disp: , Rfl:     escitalopram (Lexapro) 10 mg  tablet, Take 0.5 tablets (5 mg) by mouth once daily., Disp: 90 tablet, Rfl: 1    hydrocortisone 2.5 % cream, Hydrocortisone 2.5 % External Cream Quantity: 56  Refills: 0  Start: 9-Aug-2021, Disp: , Rfl:     levothyroxine (Synthroid, Levoxyl) 25 mcg tablet, TAKE 1 TABLET (25 MCG) BY MOUTH ONCE DAILY IN THE MORNING., Disp: 90 tablet, Rfl: 1    losartan (Cozaar) 100 mg tablet, Take 1 tablet (100 mg) by mouth once daily., Disp: 90 tablet, Rfl: 1    propranolol (Inderal) 10 mg tablet, Take 1 tablet (10 mg) by mouth 2 times a day., Disp: 180 tablet, Rfl: 1    rizatriptan MLT (Maxalt-MLT) 10 mg disintegrating tablet, Take 1 tablet (10 mg) by mouth 1 time if needed for migraine. May repeat once in 2 hours if needed (Maximum 2 tablets in 24 hours), Disp: 9 tablet, Rfl: 1    rosuvastatin (Crestor) 20 mg tablet, TAKE 1 TABLET BY MOUTH EVERY DAY, Disp: 90 tablet, Rfl: 1   Family History   Problem Relation Name Age of Onset    Atrial fibrillation Mother Megan     Other (htn) Mother Megan     Multiple myeloma Mother Megan     Peripheral vascular disease Mother Megan     Other (thyroid disorder) Mother Megan     Arthritis Mother Megan     Liver cancer Father      Atrial fibrillation Sister Tammy     Other (crest) Sister Tammy     Other (pulmonary htn) Sister Tammy     Other (pulmonary occlusive disease) Sister Tammy     Colonic polyp Sister Tammy     Peripheral vascular disease Sister Tammy     Other (thyroid disorder) Sister Tammy     Colon cancer Sister Tammy         detected abnormal cells and had surgery and was told family members should be screened every 5 years    Atrial fibrillation Brother Jad     Diabetes Brother Jad     Other (cabg) Brother Jad     Peripheral vascular disease Brother Jad     Atrial fibrillation Brother Kenneth     Heart disease Brother Kenneth     Atrial fibrillation Brother Sudheer     Diabetes Brother Sudheer     Heart disease Brother Sudheer     Stroke Brother Sudheer     Other (CREST) Other       Social History      Tobacco Use    Smoking status: Never    Smokeless tobacco: Never   Substance Use Topics    Alcohol use: Yes     Alcohol/week: 8.0 standard drinks of alcohol     Types: 2 Glasses of wine, 6 Cans of beer per week     Comment: Varies, social    Drug use: Never       ROS:  ROS: All 10 systems were reviewed and are unremarkable except for those mentioned in HPI.    Objective    There were no vitals taken for this visit.     Physical Exam:   Constitutional: A&Ox3, calm and cooperative, NAD  Eyes: PERRL, clear sclera   ENMT: Moist mucous membranes, no apparent injuries or lesions  Head/Neck: Neck supple, no JVD  Cardiovascular: Normal rate and regular rhythm, 2+ equal pulses of the distal extremities  Respiratory/Thorax: CTAB, regular respirations on RA, good symmetric chest expansion  Gastrointestinal: Abdomen soft, non tender   Extremities: No peripheral edema  Neurological: A&Ox3  Psychological: Appropriate mood and behavior  Skin: Warm and dry with no lesions or rashes    Breast Exam:  Left Breast:  NTN: 25.5  BW: 17  NTF: 7.5  Chest wall: 14  Breast height: 11  Comments: Grade 2 ptosis    Right Breast:  NTN: 24  BW: 18  NTF: 6.5  Chest wall: 14  Breast height: 11  Comments: Grade 2 ptosis    Photos  Completed at this visit Yes     Diagnostics   No results found for this or any previous visit (from the past 24 hour(s)).  MR breast bilateral w contrast full protocol    Result Date: 3/22/2024  Interpreted By:  Gonzalo Tom, STUDY: BI MR BREAST BILATERAL WITH CONTRAST FULL PROTOCOL;  3/21/2024 8:00 pm   ACCESSION NUMBER(S): [id number]   ORDERING CLINICIAN: RICARDO GÓMEZ   INDICATION: Patient recently diagnosed with invasive lobular carcinoma grade 2 in the subareolar region of the right breast. A preoperative MRI is requested.   COMPARISON: 06/08/2023, 02/19/2024, 03/01/2024   TECHNIQUE: Using a dedicated breast coil, STIR axial and T1-weighted fat saturation axial images of the breasts were obtained, the latter both  before and after intravenous administration of Gadolinium DTPA. On an independent workstation, 3-D images were formulated using [employer] including time enhancement curves, subtraction images and MIP images.   Intravenous contrast:  18 mL of Dotarem   FINDINGS: There is symmetric mild bilateral background enhancement.   Heterogeneous fibroglandular tissue.   RIGHT BREAST:  Within the right breast, in the subareolar region, there is a heterogeneously enhancing irregular mass identified. This represents the patient's biopsy-proven malignancy. There is a biopsy clip centered within the mass. In addition mass seen on the diagnostic evaluation, there is additional surrounding, confluent non mass enhancement, low-level in intensity. Although the enhancement is low-level, it is asymmetric to the left breast. Considering, the lobular pathology this should be considered suspicious. This low-level non mass enhancement extends posteriorly from the mass and then anteriorly to involve the nipple areolar complex which is flattened and retracted. There is some enhancement of the skin of the nipple-areolar complex as well. The combined biopsy-proven mass and the surrounding non mass enhancement measure 5.2 x 3.6 x 2.8 cm. There are no other discrete enhancing masses or areas of non mass enhancement identified. There is no evidence of pectoralis or chest wall involvement.   No axillary or internal mammary lymphadenopathy is appreciated.   LEFT BREAST:  No suspicious mass or nonmass enhancement is identified.   No axillary or internal mammary lymphadenopathy is appreciated.   NON-BREAST FINDINGS:  None.       Redemonstration of a biopsy-proven invasive lobular carcinoma in the subareolar region of the right breast. In contrast to the mammogram and ultrasound findings, areas additional surrounding non mass enhancement which is suspicious for additional extent of disease. In addition, there is retraction of the nipple areolar complex  with enhancement of the surrounding skin suggesting skin involvement.   No MRI evidence of malignancy in the left breast.   BI-RADS CATEGORY: BI-RADS CATEGORY:  6 Known Biopsy-Proven Malignancy. Recommendation:  Immediate Follow-up. Recommended Date:  Immediate. Laterality:  Bilateral.   For any future breast imaging appointments, please call [phone]).     MACRO: None   Signed by: Gonzalo Tom 3/22/2024 3:12 PM Dictation workstation:   [id number]       Assessment/Plan  Scott Leung is a 59 y.o. female diagnosed on 3/1/24 with right breast invasive lobular carcinoma, grade 2, ER >95% 3+, NH 0%, HER2 1+, cT2N0, stage IIA . She is referred by  for breast reconstruction options.    Planning for right mastectomy, possible bilateral pending genetic testing. Recommend pre pectoral tissue expander on the right and left mastopexy. If pt does need bilateral mastectomy, will plan for left tissue expander placement as well. Will schedule pt for pre-op appointment on 5/28 for further discussion. All questions answered and she is agreeable.     Plan for right mastectomy with  pre pectoral tissue expander and left mastopexy on 6/10/24   Pre op visit 5/28     Attending Attestation:  Danica MORFIN MD, personal performed the history, exam, and decision making on this patient.  A total of 30 mins were spent in patient counseling, review of medical records, and coordination of care.

## 2024-04-02 ENCOUNTER — OFFICE VISIT (OUTPATIENT)
Dept: PLASTIC SURGERY | Facility: CLINIC | Age: 59
End: 2024-04-02
Payer: COMMERCIAL

## 2024-04-02 VITALS
SYSTOLIC BLOOD PRESSURE: 137 MMHG | WEIGHT: 227.07 LBS | BODY MASS INDEX: 35.56 KG/M2 | DIASTOLIC BLOOD PRESSURE: 84 MMHG | HEART RATE: 62 BPM

## 2024-04-02 DIAGNOSIS — Z17.0 MALIGNANT NEOPLASM OF CENTRAL PORTION OF RIGHT BREAST IN FEMALE, ESTROGEN RECEPTOR POSITIVE (MULTI): Primary | ICD-10-CM

## 2024-04-02 DIAGNOSIS — C50.111 MALIGNANT NEOPLASM OF CENTRAL PORTION OF RIGHT BREAST IN FEMALE, ESTROGEN RECEPTOR POSITIVE (MULTI): Primary | ICD-10-CM

## 2024-04-02 PROCEDURE — 3075F SYST BP GE 130 - 139MM HG: CPT | Performed by: SURGERY

## 2024-04-02 PROCEDURE — 99214 OFFICE O/P EST MOD 30 MIN: CPT | Performed by: SURGERY

## 2024-04-02 PROCEDURE — 3079F DIAST BP 80-89 MM HG: CPT | Performed by: SURGERY

## 2024-04-08 DIAGNOSIS — C50.111 MALIGNANT NEOPLASM OF CENTRAL PORTION OF RIGHT BREAST IN FEMALE, ESTROGEN RECEPTOR POSITIVE (MULTI): ICD-10-CM

## 2024-04-08 DIAGNOSIS — Z17.0 MALIGNANT NEOPLASM OF CENTRAL PORTION OF RIGHT BREAST IN FEMALE, ESTROGEN RECEPTOR POSITIVE (MULTI): ICD-10-CM

## 2024-04-17 LAB — SCAN RESULT: NORMAL

## 2024-04-22 ENCOUNTER — CLINICAL SUPPORT (OUTPATIENT)
Dept: GENETICS | Facility: CLINIC | Age: 59
End: 2024-04-22
Payer: COMMERCIAL

## 2024-04-22 VITALS
WEIGHT: 227 LBS | DIASTOLIC BLOOD PRESSURE: 87 MMHG | HEIGHT: 67 IN | BODY MASS INDEX: 35.63 KG/M2 | SYSTOLIC BLOOD PRESSURE: 159 MMHG | HEART RATE: 63 BPM

## 2024-04-22 DIAGNOSIS — Z13.71 ENCOUNTER FOR NONPROCREATIVE GENETIC COUNSELING AND TESTING: ICD-10-CM

## 2024-04-22 DIAGNOSIS — Z17.0 MALIGNANT NEOPLASM OF CENTRAL PORTION OF RIGHT BREAST IN FEMALE, ESTROGEN RECEPTOR POSITIVE (MULTI): ICD-10-CM

## 2024-04-22 DIAGNOSIS — Z13.71 BRCA NEGATIVE: ICD-10-CM

## 2024-04-22 DIAGNOSIS — Z84.81 FHX: GENETIC DISEASE CARRIER: ICD-10-CM

## 2024-04-22 DIAGNOSIS — C50.111 MALIGNANT NEOPLASM OF CENTRAL PORTION OF RIGHT BREAST IN FEMALE, ESTROGEN RECEPTOR POSITIVE (MULTI): ICD-10-CM

## 2024-04-22 DIAGNOSIS — Z80.3 FAMILY HISTORY OF BREAST CANCER: ICD-10-CM

## 2024-04-22 DIAGNOSIS — Z71.83 ENCOUNTER FOR NONPROCREATIVE GENETIC COUNSELING AND TESTING: ICD-10-CM

## 2024-04-22 PROCEDURE — 96040 PR MEDICAL GENETICS COUNSELING EACH 30 MINUTES: CPT | Performed by: GENETIC COUNSELOR, MS

## 2024-04-22 NOTE — PROGRESS NOTES
History of Present Illness:  Scott Leung is a 59 y.o. female seen in follow-up in the Cancer Genetics Clinic. She was last sen on 3/27/2024, at which time she chose to pursue hereditary cancer testing due to her recent diagnosis of breast cancer, and a family history of breast cancer, as well as a family history of both BRCA2 and WARREN pathogenic variants in paternal 1st cousins. She underwent testing for both known familial mutations as part of the [facility] panel, which was negative (normal). These results were reviewed with Ms. Leung today along with her cousin Chely, who is one of the cousins in her family who is BRCA2 positive. Ms. Leung was provided with a hard copy of her results at her appointment, and also has access via her  [employer].    Family history (reviewed & updated):  -Patient, ER positive lobular breast cancer per the above;  -Mother, multiple myeloma in her 70s,  at 78;  -Father, liver cancer at 40, alcohol use, WWII exposures,  at 40;  -Paternal aunt, lung cancer, ;  -Paternal first cousin #1 (son of aunt above), throat cancer;  -Paternal first cousin #2 (daughter of aunt above), breast cancer at 63, found to have WARREN mutation [id number](splice donor) on [employer] testing;  -Paternal first cousin #3 (also daughter of aunt above), breast cancer at 60;   -Reportedly both WARREN & BRCA2 positive;  -Paternal first cousin #4 (also daughter of aunt above), breast cancer at 45;   -Reportedly BRCA2 positive;  -Paternal uncle, lung cancer,  at 50;  -Paternal first cousin #5 (daughter of uncle above), breast cancer at 43,  at 47, found to have BRCA2 mutation on [facility] testing BRCA2 [id number];  -Paternal first cousin #6 (also daughter of uncle above), no history of cancer, reportedly BRCA2 positive;  -Paternal first cousin once removed, no history of cancer, daughter cousin above, also BRCA2 positive ([facility] single-site testing);  -Paternal first cousin #7, breast cancer  at 64;   -Reportedly negative genetic testing;  -Paternal first cousin #8, breast cancer at 59, found to be an MUTYH carrier (MUTYH c.[id number]) on 60-gene custom panel from [employer].     Ms. Leung is of Maltese/English (maternal) and Malay (paternal) ancestry.  There is no known Ashkenazi Amish ancestry or consanguinity.    Genetic test results: 34-gene negative [facility] CancerNext panel with [employer]. Neither the known familial WARREN mutation nor the known familial BRCA2 mutation were detected.    Genetic counseling:  Ms. Leung's results were NEGATIVE, meaning that no disease causing mutations were identified. A genetic cause for her recent diagnosis of breast cancer has not been identified. She tested negative for both of the known familial mutations (WARREN & BRCA2) that are in some of her cancer-affected paternal relatives. It seems more likely that she developed a sporadic cancer.    One reason Ms. Leung underwent genetic testing was to better understand her risk to develop a second breast cancer to help determine if further breast screening or surgery is indicated. Because her results were negative,Ms. Leung does not have an identifiable genetic risk factor that places her at an increased risk to develop a second breast cancer. She should make her surgical decision independent of the family history of WARREN and BRCA2 mutations, since she does not carry either of these, and continue being followed by her breast cancer care team.    It was previously discussed that women with BRCA1 and BRCA2 mutations have an increased risk for ovarian cancer. With negative test results and no family history of ovarian cancer, Ms. Leung is not considered at increased risk for this cancer type from a genetic standpoint. Prophylactic surgery is not indicated from a genetic standpoint.    We also discussed that Ms. Leung should follow her primary care providers' recommendations for all other age-related cancer screenings, as  "well as the recommendation to have more frequent colonoscopies (because of sister's pathology (unsure if cancer or not), she was told to repeat in 5 years).     Although her results were negative, Ms. Leung's female relatives, including her daughters and nieces, are considered to have an increased risk to develop breast cancer over the general population, based on the family history alone. They should speak to their healthcare providers about their breast cancer screening protocols, as they may be a candidate for annual breast MRIs, in addition to an annual mammogram and clinical breast exam, if their lifetime risk of breast cancer reaches or exceeds 20%. Breast cancer screening should also begin 10 years younger than the youngest diagnosis in the family, or age 40, whichever comes first. For her daughters, this would be age 40, but for her nieces it will depend on whether or not they carry either/both/none of the known familial mutations. She plans to share this information with her relatives.    Many of Ms. Leung's paternal relatives, including her siblings and the nieces of her brother who passed away, are still candidates for genetic testing themselves, given the known paternal WARREN and BRCA2 mutations. If her relatives are local, we would be glad to see them here at .  They can call [phone], option 1, to schedule an appointment.  If they live outside the [facility], their relatives can find a genetics specialist in their area by visiting the National Society for Genetic Counselors website at: <http://www.nsgc.org/> under \"Find a Genetic Counselor,\" with \"Cancer\" specified under special area.     A brief family history of Ms. Leung children's father was obtained. She shared that he was diagnosed with prostate cancer, and his father  of metastatic prostate cancer. We reviewed that he is a candidate for his own cancer genetics risk evaluation and testing based on this history, but that their " children can always choose to be tested if he is unable or unwilling to pursue testing.    Our understanding of genetic contribution to cancer diagnoses is always evolving, so there may be additional testing recommended in the future. Ms. Leung was asked to keep us apprised as to any changes to her personal and/or family history of cancer, and to contact us in 2-3 years to determine if there have been any changes since our discussion today.    Doris Fuentes MS, [facility]  Genetic Counselor  [facility]  [phone]    Reviewed by:  Brigitte Weber MD  Clinical   [facility][1]  [phone]    Time spent with patient:  39 minutes (2:29-3:08 pm), in person.

## 2024-05-09 DIAGNOSIS — G43.909 MIGRAINE, UNSPECIFIED, NOT INTRACTABLE, WITHOUT STATUS MIGRAINOSUS: ICD-10-CM

## 2024-05-09 DIAGNOSIS — I10 BENIGN ESSENTIAL HYPERTENSION: ICD-10-CM

## 2024-05-14 RX ORDER — PROPRANOLOL HYDROCHLORIDE 10 MG/1
10 TABLET ORAL 2 TIMES DAILY
Qty: 180 TABLET | Refills: 1 | Status: SHIPPED | OUTPATIENT
Start: 2024-05-14

## 2024-05-14 RX ORDER — LOSARTAN POTASSIUM 100 MG/1
100 TABLET ORAL DAILY
Qty: 90 TABLET | Refills: 0 | Status: SHIPPED | OUTPATIENT
Start: 2024-05-14

## 2024-05-16 ENCOUNTER — APPOINTMENT (OUTPATIENT)
Dept: PRIMARY CARE | Facility: CLINIC | Age: 59
End: 2024-05-16
Payer: COMMERCIAL

## 2024-05-28 ENCOUNTER — OFFICE VISIT (OUTPATIENT)
Dept: PLASTIC SURGERY | Facility: CLINIC | Age: 59
End: 2024-05-28
Payer: COMMERCIAL

## 2024-05-28 ENCOUNTER — LAB (OUTPATIENT)
Dept: LAB | Facility: LAB | Age: 59
End: 2024-05-28
Payer: COMMERCIAL

## 2024-05-28 VITALS
HEIGHT: 67 IN | DIASTOLIC BLOOD PRESSURE: 94 MMHG | HEART RATE: 75 BPM | SYSTOLIC BLOOD PRESSURE: 175 MMHG | BODY MASS INDEX: 34.84 KG/M2 | WEIGHT: 222 LBS

## 2024-05-28 DIAGNOSIS — Z17.0 MALIGNANT NEOPLASM OF CENTRAL PORTION OF RIGHT BREAST IN FEMALE, ESTROGEN RECEPTOR POSITIVE (MULTI): ICD-10-CM

## 2024-05-28 DIAGNOSIS — E78.49 OTHER HYPERLIPIDEMIA: ICD-10-CM

## 2024-05-28 DIAGNOSIS — I10 WHITE COAT SYNDROME WITH DIAGNOSIS OF HYPERTENSION: ICD-10-CM

## 2024-05-28 DIAGNOSIS — I10 PRIMARY HYPERTENSION: ICD-10-CM

## 2024-05-28 DIAGNOSIS — C50.111 MALIGNANT NEOPLASM OF CENTRAL PORTION OF RIGHT BREAST IN FEMALE, ESTROGEN RECEPTOR POSITIVE (MULTI): ICD-10-CM

## 2024-05-28 DIAGNOSIS — Z01.818 PREOP TESTING: ICD-10-CM

## 2024-05-28 DIAGNOSIS — Z98.890 S/P BREAST RECONSTRUCTION: Primary | ICD-10-CM

## 2024-05-28 DIAGNOSIS — G89.18 ACUTE POSTOPERATIVE PAIN: ICD-10-CM

## 2024-05-28 LAB
ALBUMIN SERPL BCP-MCNC: 4.6 G/DL (ref 3.4–5)
ALP SERPL-CCNC: 80 U/L (ref 33–136)
ALT SERPL W P-5'-P-CCNC: 30 U/L (ref 7–45)
ANION GAP SERPL CALC-SCNC: 14 MMOL/L (ref 10–20)
AST SERPL W P-5'-P-CCNC: 25 U/L (ref 9–39)
BILIRUB SERPL-MCNC: 0.6 MG/DL (ref 0–1.2)
BUN SERPL-MCNC: 13 MG/DL (ref 6–23)
CALCIUM SERPL-MCNC: 9.4 MG/DL (ref 8.6–10.6)
CHLORIDE SERPL-SCNC: 107 MMOL/L (ref 98–107)
CO2 SERPL-SCNC: 27 MMOL/L (ref 21–32)
CREAT SERPL-MCNC: 0.84 MG/DL (ref 0.5–1.05)
EGFRCR SERPLBLD CKD-EPI 2021: 80 ML/MIN/1.73M*2
ERYTHROCYTE [DISTWIDTH] IN BLOOD BY AUTOMATED COUNT: 13.3 % (ref 11.5–14.5)
GLUCOSE SERPL-MCNC: 104 MG/DL (ref 74–99)
HCT VFR BLD AUTO: 39.8 % (ref 36–46)
HGB BLD-MCNC: 13.3 G/DL (ref 12–16)
MCH RBC QN AUTO: 30.9 PG (ref 26–34)
MCHC RBC AUTO-ENTMCNC: 33.4 G/DL (ref 32–36)
MCV RBC AUTO: 93 FL (ref 80–100)
NRBC BLD-RTO: 0 /100 WBCS (ref 0–0)
PLATELET # BLD AUTO: 299 X10*3/UL (ref 150–450)
POTASSIUM SERPL-SCNC: 4.3 MMOL/L (ref 3.5–5.3)
PREALB SERPL-MCNC: 23.4 MG/DL (ref 18–40)
PROT SERPL-MCNC: 7.1 G/DL (ref 6.4–8.2)
RBC # BLD AUTO: 4.3 X10*6/UL (ref 4–5.2)
SODIUM SERPL-SCNC: 144 MMOL/L (ref 136–145)
WBC # BLD AUTO: 6.4 X10*3/UL (ref 4.4–11.3)

## 2024-05-28 PROCEDURE — 99214 OFFICE O/P EST MOD 30 MIN: CPT | Performed by: SURGERY

## 2024-05-28 PROCEDURE — 84134 ASSAY OF PREALBUMIN: CPT

## 2024-05-28 PROCEDURE — 36415 COLL VENOUS BLD VENIPUNCTURE: CPT

## 2024-05-28 PROCEDURE — 85027 COMPLETE CBC AUTOMATED: CPT

## 2024-05-28 PROCEDURE — 3077F SYST BP >= 140 MM HG: CPT | Performed by: SURGERY

## 2024-05-28 PROCEDURE — 3080F DIAST BP >= 90 MM HG: CPT | Performed by: SURGERY

## 2024-05-28 PROCEDURE — 80053 COMPREHEN METABOLIC PANEL: CPT

## 2024-05-28 RX ORDER — GABAPENTIN 600 MG/1
600 TABLET ORAL ONCE
Status: CANCELLED | OUTPATIENT
Start: 2024-05-28 | End: 2024-05-28

## 2024-05-28 RX ORDER — DIAZEPAM 5 MG/1
5 TABLET ORAL ONCE
Qty: 1 TABLET | Refills: 0 | Status: SHIPPED | OUTPATIENT
Start: 2024-05-28 | End: 2024-06-10

## 2024-05-28 RX ORDER — CHLORHEXIDINE GLUCONATE 40 MG/ML
SOLUTION TOPICAL ONCE
Qty: 30 ML | Refills: 0 | Status: SHIPPED | OUTPATIENT
Start: 2024-05-28 | End: 2024-05-28

## 2024-05-28 RX ORDER — GABAPENTIN 300 MG/1
300 CAPSULE ORAL EVERY 8 HOURS
Qty: 42 CAPSULE | Refills: 0 | Status: SHIPPED | OUTPATIENT
Start: 2024-05-28 | End: 2024-06-11

## 2024-05-28 RX ORDER — ACETAMINOPHEN 500 MG
1000 TABLET ORAL EVERY 8 HOURS
Qty: 84 TABLET | Refills: 0 | Status: SHIPPED | OUTPATIENT
Start: 2024-05-28 | End: 2024-06-10 | Stop reason: HOSPADM

## 2024-05-28 RX ORDER — AMOXICILLIN AND CLAVULANATE POTASSIUM 875; 125 MG/1; MG/1
1 TABLET, FILM COATED ORAL 2 TIMES DAILY
Qty: 28 TABLET | Refills: 1 | Status: SHIPPED | OUTPATIENT
Start: 2024-05-28 | End: 2024-06-11

## 2024-05-28 RX ORDER — SCOLOPAMINE TRANSDERMAL SYSTEM 1 MG/1
1 PATCH, EXTENDED RELEASE TRANSDERMAL ONCE
Status: CANCELLED | OUTPATIENT
Start: 2024-05-28 | End: 2024-05-28

## 2024-05-28 RX ORDER — CELECOXIB 200 MG/1
200 CAPSULE ORAL 2 TIMES DAILY
Qty: 28 CAPSULE | Refills: 0 | Status: SHIPPED | OUTPATIENT
Start: 2024-05-28 | End: 2024-06-11

## 2024-05-28 NOTE — H&P (VIEW-ONLY)
"PLASTIC SURGERY PRE-OP CLINIC NOTE    Subjective :  Patient ID: Scott Leung  is a 59 y.o. female is here for pre-op appointment     Planned Date of Procedure: 6/10/24  Planned Surgical Procedure: Right skin-sparing mastectomy with tissue expander placement    HPI:   Scott Leung is a 59 y.o. female is here for pre-operative appointment for the above procedure(s).     Currently, the patient states there were no changes in their medications or medical history.     Patient's vitals are Blood pressure (!) 175/94, pulse 75, height 1.702 m (5' 7\"), weight 101 kg (222 lb).  Patient states she did not take her blood pressure medications this morning.    Patient is currently on an ACE, ARB or Diuretic and has started to transition the medication dosing to the evening.     Patient has Good Rx Card.     Patient is not on chronic NSAIDs.       MEDICATIONS  Current Outpatient Medications:     amLODIPine (Norvasc) 10 mg tablet, TAKE 1 TABLET BY MOUTH EVERY DAY, Disp: 90 tablet, Rfl: 1    anastrozole (Arimidex) 1 mg tablet, Take 1 tablet (1 mg total) by mouth once daily.  Swallow whole with a drink of water., Disp: 90 tablet, Rfl: 0    ASPIRIN LOW-STRENGTH ORAL, Take 81 mg by mouth every other day., Disp: , Rfl:     escitalopram (Lexapro) 10 mg tablet, Take 0.5 tablets (5 mg) by mouth once daily., Disp: 90 tablet, Rfl: 1    hydrocortisone 2.5 % cream, Hydrocortisone 2.5 % External Cream Quantity: 56  Refills: 0  Start: 9-Aug-2021, Disp: , Rfl:     levothyroxine (Synthroid, Levoxyl) 25 mcg tablet, TAKE 1 TABLET (25 MCG) BY MOUTH ONCE DAILY IN THE MORNING., Disp: 90 tablet, Rfl: 1    losartan (Cozaar) 100 mg tablet, TAKE 1 TABLET BY MOUTH EVERY DAY, Disp: 90 tablet, Rfl: 0    propranolol (Inderal) 10 mg tablet, TAKE 1 TABLET BY MOUTH TWICE A DAY, Disp: 180 tablet, Rfl: 1    rizatriptan MLT (Maxalt-MLT) 10 mg disintegrating tablet, Take 1 tablet (10 mg) by mouth 1 time if needed for migraine. May repeat once in 2 hours if needed " (Maximum 2 tablets in 24 hours), Disp: 9 tablet, Rfl: 1    rosuvastatin (Crestor) 20 mg tablet, TAKE 1 TABLET BY MOUTH EVERY DAY, Disp: 90 tablet, Rfl: 1    acetaminophen (Tylenol Extra Strength) 500 mg tablet, Take 2 tablets (1,000 mg) by mouth every 8 hours for 14 days., Disp: 84 tablet, Rfl: 0    amoxicillin-pot clavulanate (Augmentin) 875-125 mg tablet, Take 1 tablet by mouth 2 times a day for 14 days., Disp: 28 tablet, Rfl: 1    celecoxib (CeleBREX) 200 mg capsule, Take 1 capsule (200 mg) by mouth 2 times a day for 14 days., Disp: 28 capsule, Rfl: 0    chlorhexidine (Hibiclens) 4 % external liquid, Apply topically 1 time for 1 dose. Shower normally. Apply Hibiclens to the surgical area from clavicle to hips, making sure to apply under the breast and axilla, as well as in the belly button. Do not worry about the back. Do not wash off., Disp: 30 mL, Rfl: 0    cholecalciferol (Vitamin D-3) 25 MCG (1000 UT) capsule, Take 1 capsule (25 mcg) by mouth once daily., Disp: , Rfl:     diazePAM (Valium) 5 mg tablet, Take 1 tablet (5 mg) by mouth 1 time for 1 dose., Disp: 1 tablet, Rfl: 0    gabapentin (Neurontin) 300 mg capsule, Take 1 capsule (300 mg) by mouth every 8 hours for 14 days., Disp: 42 capsule, Rfl: 0     REVIEW OF SYSTEMS:    Constitutional: negative for fevers, chills, unintentional weight loss  HEENT: negative for changes in vision, headaches, changes in hearing, congestion, sore throat  Cardiovascular: negative for chest pain, palpitations  Respiratory: negative for cough, shortness of breath  Gastrointestinal: negative for nausea, vomiting, diarrhea  Genitourinary: negative for dysuria, hematuria  Musculoskeletal: negative for joint swelling or pain  Skin: negative for rashes or lesions  Psych: negative for depression, anxiety  Endocrine: negative for polyuria, polydipsia, cold/heat intolerance  Hem/Lymph: negative for bleeding disorder    PHYSICAL EXAM  General: alert and oriented, no apparent  distress  Psych: normal mood and affect, judgement intact.   Eyes: No conjunctival erythema, extraocular movements intact, no scleral icterus, pupils equal and reactive to light  HENT: normocephalic, atraumatic, no rhinorrhea, no trauma to external meatus, no prior surgical scars  CV: hemodynamically stable  Resp: unlabored, no increased work of breathing, equal bilateral chest rise, no cough or stridor  Abdomen: soft, non-tender, non-distended. No surgical scars present. No rashes noted. No rectus diastasis present.   Neuro: Unremarkable without focal findings, AAOx3, CN 2-12 grossly intact  Extremities/Musculoskeletal: Upper and lower extremities are atraumatic in appearance without tenderness or deformity. No swelling or erythema. Full range of motion is noted to all joints. Steady gait noted.    Skin: Intact, soft and warm; no rashes, lesions, or bruising. No large masses or keloids noted on exam.     Focused exam of the breasts:   - Right Breast: Grade 2 Ptosis. NAC with intact sensation. Nipple retraction noted. No drainage noted. No palpable masses.    - Left Breast: Grade 2 Ptosis. NAC with intact sensation. No nipple retraction or drainage noted. No palpable masses. or drainage noted. No palpable masses.       LABORATORY  Nutrition  Lab Results   Component Value Date    ALBUMIN 4.5 03/27/2024          ASSESSMENT/PLAN  Scott Leung is a 59 y.o. female diagnosed on 3/1/24 with right breast invasive lobular carcinoma, grade 2, ER >95% 3+, LA 0%, HER2 1+, cT2N0, stage IIA. Diagnostic imaging demonstrated at the central/subareolar position there is a 2.9 cm irregular, nonparallel spiculated mass.      Patient blood pressure is elevated at visit today. She did not take her prescribed blood pressure medications this AM. BP is improved with second reading. She is going home now to take her medications.    The patient will be undergoing Right skin-sparing mastectomy with tissue expander placement on 6/10/24 at [facility] [place].  13 cm for tissue expander.     Informed consent discussed with the patient, including: condition, proposed care, treatments and services, alternative forms of treatment, and risks of no treatment.  Details discussed around the procedures to be used, and the risks and hazards involved, potential benefits, and side effects of the patient's proposed care, treatment, and services; the likelihood of the patient achieving his or her goals; and any potential problems that might occur during recuperation. Reasonable alternative also discussed with the patient's proposed care, treatment, and services. The discussion encompasses risks, benefits, and side effects related to the alternative and risks related to not receiving the proposed care, treatment, and services. Written informed consent was obtained.    The patient was counseled to avoid anticoagulation medications and herbals including - but not limited to - ASA, NSAIDs, Plavix, fish oils, and green tea    Patient was counseled to avoid nicotine and areas with high amounts of secondhand smoke.     Patient was counseled to increase protein intake after surgery to aid with wound healing with goal of 120g/day.     Patient was counseled on need for compression garments and/or support bras, given information about where to acquire these garments, and instructions to bring with on the day of surgery.     We discussed postoperative follow-up visits, recuperation and return to work.    Advised patient to contact office with any questions or concerns    All findings and diagnosis was discussed with patient at the time of this visit. Patient states they understand and are in agreement with the treatment plan at the time of this visit.     Photos completed at last visit.    Patient is getting lab work today.    Medications eRx'd:   - Valium 5mg PO x 1 to be taken morning of surgery (ie while driving over) for white coat HTN.   -Celebrex 200 mg BID with meals- Good Rx card provided  to the patient.  -Gabapentin 300 mg Q8H   -Tylenol 1000 mg Q8H  -Augmentin 875-125 mg BID x 14 days with 1 refill  -Hibiclens - instructed the patient to wash breast and/or abdomen and margins the night before surgery or the morning of surgery; avoid washing face/eyes      Preop holding meds ordered/verified for day of surgery:  Gabapentin 600 mg   Scopolamine patch     RTC 6/18/24 after surgery    Scribe Attestation  By signing my name below, Jessica MORFIN, Scribe   attest that this documentation has been prepared under the direction and in the presence of Danica Torres MD. Obtained verbal consent for scribe from patient.     Attending Attestation:  Danica MORFIN MD, personal performed the history, exam, and decision making on this patient.  A total of 30 mins were spent in patient counseling, review of medical records, and coordination of care.      Implants ordered:  Artoura Tissue expander high profile 475 ml base width 13 cm x 2  TIGR mesh 15cm x 20cm x 1

## 2024-05-28 NOTE — LETTER
May 28, 2024     Patient: Scott Leung   YOB: 1965   Date of Visit: 5/28/2024       To Whom It May Concern:    Scott Leung was seen in my clinic on 5/28/2024 at 10:40 am. Please excuse Scott for her absence from work on this day to make the appointment.    If you have any questions or concerns, please don't hesitate to call.         Sincerely,         Danica Torres MD        CC: No Recipients

## 2024-05-28 NOTE — PROGRESS NOTES
"PLASTIC SURGERY PRE-OP CLINIC NOTE    Subjective :  Patient ID: Scott Leung  is a 59 y.o. female is here for pre-op appointment     Planned Date of Procedure: 6/10/24  Planned Surgical Procedure: Right skin-sparing mastectomy with tissue expander placement    HPI:   Scott Leung is a 59 y.o. female is here for pre-operative appointment for the above procedure(s).     Currently, the patient states there were no changes in their medications or medical history.     Patient's vitals are Blood pressure (!) 175/94, pulse 75, height 1.702 m (5' 7\"), weight 101 kg (222 lb).  Patient states she did not take her blood pressure medications this morning.    Patient is currently on an ACE, ARB or Diuretic and has started to transition the medication dosing to the evening.     Patient has Good Rx Card.     Patient is not on chronic NSAIDs.       MEDICATIONS  Current Outpatient Medications:     amLODIPine (Norvasc) 10 mg tablet, TAKE 1 TABLET BY MOUTH EVERY DAY, Disp: 90 tablet, Rfl: 1    anastrozole (Arimidex) 1 mg tablet, Take 1 tablet (1 mg total) by mouth once daily.  Swallow whole with a drink of water., Disp: 90 tablet, Rfl: 0    ASPIRIN LOW-STRENGTH ORAL, Take 81 mg by mouth every other day., Disp: , Rfl:     escitalopram (Lexapro) 10 mg tablet, Take 0.5 tablets (5 mg) by mouth once daily., Disp: 90 tablet, Rfl: 1    hydrocortisone 2.5 % cream, Hydrocortisone 2.5 % External Cream Quantity: 56  Refills: 0  Start: 9-Aug-2021, Disp: , Rfl:     levothyroxine (Synthroid, Levoxyl) 25 mcg tablet, TAKE 1 TABLET (25 MCG) BY MOUTH ONCE DAILY IN THE MORNING., Disp: 90 tablet, Rfl: 1    losartan (Cozaar) 100 mg tablet, TAKE 1 TABLET BY MOUTH EVERY DAY, Disp: 90 tablet, Rfl: 0    propranolol (Inderal) 10 mg tablet, TAKE 1 TABLET BY MOUTH TWICE A DAY, Disp: 180 tablet, Rfl: 1    rizatriptan MLT (Maxalt-MLT) 10 mg disintegrating tablet, Take 1 tablet (10 mg) by mouth 1 time if needed for migraine. May repeat once in 2 hours if needed " (Maximum 2 tablets in 24 hours), Disp: 9 tablet, Rfl: 1    rosuvastatin (Crestor) 20 mg tablet, TAKE 1 TABLET BY MOUTH EVERY DAY, Disp: 90 tablet, Rfl: 1    acetaminophen (Tylenol Extra Strength) 500 mg tablet, Take 2 tablets (1,000 mg) by mouth every 8 hours for 14 days., Disp: 84 tablet, Rfl: 0    amoxicillin-pot clavulanate (Augmentin) 875-125 mg tablet, Take 1 tablet by mouth 2 times a day for 14 days., Disp: 28 tablet, Rfl: 1    celecoxib (CeleBREX) 200 mg capsule, Take 1 capsule (200 mg) by mouth 2 times a day for 14 days., Disp: 28 capsule, Rfl: 0    chlorhexidine (Hibiclens) 4 % external liquid, Apply topically 1 time for 1 dose. Shower normally. Apply Hibiclens to the surgical area from clavicle to hips, making sure to apply under the breast and axilla, as well as in the belly button. Do not worry about the back. Do not wash off., Disp: 30 mL, Rfl: 0    cholecalciferol (Vitamin D-3) 25 MCG (1000 UT) capsule, Take 1 capsule (25 mcg) by mouth once daily., Disp: , Rfl:     diazePAM (Valium) 5 mg tablet, Take 1 tablet (5 mg) by mouth 1 time for 1 dose., Disp: 1 tablet, Rfl: 0    gabapentin (Neurontin) 300 mg capsule, Take 1 capsule (300 mg) by mouth every 8 hours for 14 days., Disp: 42 capsule, Rfl: 0     REVIEW OF SYSTEMS:    Constitutional: negative for fevers, chills, unintentional weight loss  HEENT: negative for changes in vision, headaches, changes in hearing, congestion, sore throat  Cardiovascular: negative for chest pain, palpitations  Respiratory: negative for cough, shortness of breath  Gastrointestinal: negative for nausea, vomiting, diarrhea  Genitourinary: negative for dysuria, hematuria  Musculoskeletal: negative for joint swelling or pain  Skin: negative for rashes or lesions  Psych: negative for depression, anxiety  Endocrine: negative for polyuria, polydipsia, cold/heat intolerance  Hem/Lymph: negative for bleeding disorder    PHYSICAL EXAM  General: alert and oriented, no apparent  distress  Psych: normal mood and affect, judgement intact.   Eyes: No conjunctival erythema, extraocular movements intact, no scleral icterus, pupils equal and reactive to light  HENT: normocephalic, atraumatic, no rhinorrhea, no trauma to external meatus, no prior surgical scars  CV: hemodynamically stable  Resp: unlabored, no increased work of breathing, equal bilateral chest rise, no cough or stridor  Abdomen: soft, non-tender, non-distended. No surgical scars present. No rashes noted. No rectus diastasis present.   Neuro: Unremarkable without focal findings, AAOx3, CN 2-12 grossly intact  Extremities/Musculoskeletal: Upper and lower extremities are atraumatic in appearance without tenderness or deformity. No swelling or erythema. Full range of motion is noted to all joints. Steady gait noted.    Skin: Intact, soft and warm; no rashes, lesions, or bruising. No large masses or keloids noted on exam.     Focused exam of the breasts:   - Right Breast: Grade 2 Ptosis. NAC with intact sensation. Nipple retraction noted. No drainage noted. No palpable masses.    - Left Breast: Grade 2 Ptosis. NAC with intact sensation. No nipple retraction or drainage noted. No palpable masses. or drainage noted. No palpable masses.       LABORATORY  Nutrition  Lab Results   Component Value Date    ALBUMIN 4.5 03/27/2024          ASSESSMENT/PLAN  Scott Leung is a 59 y.o. female diagnosed on 3/1/24 with right breast invasive lobular carcinoma, grade 2, ER >95% 3+, SC 0%, HER2 1+, cT2N0, stage IIA. Diagnostic imaging demonstrated at the central/subareolar position there is a 2.9 cm irregular, nonparallel spiculated mass.      Patient blood pressure is elevated at visit today. She did not take her prescribed blood pressure medications this AM. BP is improved with second reading. She is going home now to take her medications.    The patient will be undergoing Right skin-sparing [place] with [place] [facility] [place]  [place] for tissue expander.     Informed consent discussed with the patient, including: condition, proposed care, treatments and services, alternative forms of treatment, and risks of no treatment.  Details discussed around the procedures to be used, and the risks and hazards involved, potential benefits, and side effects of the patient's proposed care, treatment, and services; the likelihood of the patient achieving his or her goals; and any potential problems that might occur during recuperation. Reasonable alternative also discussed with the patient's proposed care, treatment, and services. The discussion encompasses risks, benefits, and side effects related to the alternative and risks related to not receiving the proposed care, treatment, and services. Written informed consent was obtained.    The patient was counseled to avoid anticoagulation medications and herbals including - but not limited to - ASA, NSAIDs, Plavix, fish oils, and green tea    Patient was counseled to avoid nicotine and areas with high amounts of secondhand smoke.     Patient was counseled to increase protein intake after surgery to aid with wound healing with goal of 120g/day.     Patient was counseled on need for compression garments and/or support bras, given information about where to acquire these garments, and instructions to bring with on the day of surgery.     We discussed postoperative follow-up visits, recuperation and return to work.    Advised patient to contact office with any questions or concerns    All findings and diagnosis was discussed with patient at the time of this visit. Patient states they understand and are in agreement with the treatment plan at the time of this visit.     Photos completed at last visit.    Patient is getting lab work today.    Medications eRx'd:   - Valium 5mg PO x 1 to be taken morning of surgery (ie while driving over) for white coat HTN.   -Celebrex 200 mg BID with meals- Good Rx card provided  to the patient.  -Gabapentin 300 mg Q8H   -Tylenol 1000 mg Q8H  -Augmentin 875-125 mg BID x 14 days with 1 refill  -Hibiclens - instructed the patient to wash breast and/or abdomen and margins the night before surgery or the morning of surgery; avoid washing face/eyes      Preop holding meds ordered/verified for day of surgery:  Gabapentin 600 mg   Scopolamine patch     RTC 6/18/24 after surgery    Scribe Attestation  By signing my name below, Jessica MORFIN, Scribe   attest that this documentation has been prepared under the direction and in the presence of Danica Torres MD. Obtained verbal consent for scribe from patient.     Attending Attestation:  Danica MORFIN MD, personal performed the history, exam, and decision making on this patient.  A total of 30 mins were spent in patient counseling, review of medical records, and coordination of care.      Implants ordered:  Artoura Tissue expander high profile 475 ml base width 13 cm x 2  TIGR mesh 15cm x 20cm x 1

## 2024-06-07 ENCOUNTER — APPOINTMENT (OUTPATIENT)
Dept: PRIMARY CARE | Facility: CLINIC | Age: 59
End: 2024-06-07
Payer: COMMERCIAL

## 2024-06-07 ENCOUNTER — ANESTHESIA EVENT (OUTPATIENT)
Dept: OPERATING ROOM | Facility: HOSPITAL | Age: 59
End: 2024-06-07
Payer: COMMERCIAL

## 2024-06-07 PROBLEM — E66.811 OBESITY (BMI 30.0-34.9): Status: ACTIVE | Noted: 2024-06-07

## 2024-06-07 PROBLEM — E66.9 OBESITY (BMI 30.0-34.9): Status: ACTIVE | Noted: 2024-06-07

## 2024-06-07 NOTE — ANESTHESIA PREPROCEDURE EVALUATION
Patient: Scott Leung    Procedure Information       Date/Time: 06/10/24 5412    Procedures:       Right Breast Skin Sparing Mastectomy; Intra-Operative Lymphatic Mapping; Axillary [place] Lymph Node Biopsy (Right: Breast)      Right Breast Insertion Tissue Expander; Placement of Mesh (Right: Breast)    Location: [facility] OR 03 / [facility] OR    Surgeons: Minal Aaron MD; Danica Torres MD                                                           Pre-Anesthesia Evaluation      Scott Leung is a 59 y.o. female with anxiety, depression, hypothyroidism and obesity who presents for procedure stated above.         Surgical History reviewed   Past Surgical History:   Procedure Laterality Date    INNER EAR SURGERY      KNEE ARTHROSCOPY W/ DEBRIDEMENT  10/09/2014    Knee Arthroscopy (Therapeutic)    OVARIAN CYST REMOVAL      TUBAL LIGATION      also had uterine ablation    ULNAR NERVE TRANSPOSITION      WISDOM TOOTH EXTRACTION  1985       Social History reviewed  Social History     Tobacco Use    Smoking status: Never    Smokeless tobacco: Never   Substance Use Topics    Alcohol use: Yes     Alcohol/week: 8.0 standard drinks of alcohol     Types: 2 Glasses of wine, 6 Cans of beer per week     Comment: Varies, social    Drug use: Never     Allergies and Medications reviewed  Allergies   Allergen Reactions    Tetanus Vaccines And Toxoid Other    Latex Rash     Current Outpatient Medications   Medication Instructions    acetaminophen (TYLENOL EXTRA STRENGTH) 1,000 mg, oral, Every 8 hours    amLODIPine (NORVASC) 10 mg, oral, Daily    amoxicillin-pot clavulanate (Augmentin) 875-125 mg tablet 1 tablet, oral, 2 times daily    anastrozole (ARIMIDEX) 1 mg, oral, Daily, Swallow whole with a drink of water.    ASPIRIN LOW-STRENGTH ORAL 81 mg, oral, Every other day    celecoxib (CELEBREX) 200 mg, oral, 2 times daily    cholecalciferol (VITAMIN D-3) 25 mcg, oral, Daily    diazePAM (VALIUM) 5 mg, oral, Once    escitalopram (LEXAPRO)  "5 mg, oral, Daily    gabapentin (NEURONTIN) 300 mg, oral, Every 8 hours    hydrocortisone 2.5 % cream Hydrocortisone 2.5 % External Cream  Quantity: 56   Refills: 0  Start: 9-Aug-2021    levothyroxine (SYNTHROID, LEVOXYL) 25 mcg, oral, Every morning    losartan (COZAAR) 100 mg, oral, Daily    propranolol (INDERAL) 10 mg, oral, 2 times daily    rizatriptan MLT (MAXALT-MLT) 10 mg, oral, Once as needed, May repeat once in 2 hours if needed (Maximum 2 tablets in 24 hours)    rosuvastatin (CRESTOR) 20 mg, oral, Daily       Relevant Results reviewed  Recent Labs     05/28/24  1210 03/27/24  1414 09/01/23  1037   WBC 6.4 8.9 6.4   HGB 13.3 13.9 14.1   HCT 39.8 39.7 41.8    321 302   MCV 93 89 91     Recent Labs     05/28/24  1210 03/27/24  1414 09/01/23  1037    142 142   K 4.3 4.2 4.0    106 107   BUN 13 20 15   CREATININE 0.84 0.76 0.82   CO2 27 26 26   CALCIUM 9.4 9.8 9.7   PROT 7.1 7.1 7.0   BILITOT 0.6 0.6 0.6   ALKPHOS 80 77 77   ALT 30 47* 31   AST 25 28 23   GLUCOSE 104* 115* 107*           No lab exists for component: \"CK\", \"CKMBP\"  Recent Labs     03/11/22  1158   HGBA1C 4.8     Recent Labs     09/01/23  1037 05/20/22  1500 03/11/22  1158   TSH 1.26 1.06 1.10     CT HEART CALCIUM SCORING WO IV CONTRAST    - Impression -  1. Coronary artery calcium score of 0*.    *Coronary Artery  Agatston score  Vitals  Visit Vitals  /76   Pulse 66   Temp 36.2 °C (97.2 °F) (Temporal)   Resp 16   Ht 1.702 m (5' 7\")   Wt 101 kg (221 lb 12.5 oz)   SpO2 98%   BMI 34.74 kg/m²   OB Status Postmenopausal   Smoking Status Never   BSA 2.19 m²             Relevant Problems   Cardiac   (+) Benign essential hypertension   (+) Hyperlipidemia      Neuro   (+) Depression with anxiety   (+) Situational anxiety      Endocrine   (+) Hypothyroidism   (+) Obesity (BMI 30.0-34.9)      Musculoskeletal   (+) Lumbar stenosis   (+) Primary localized osteoarthrosis of left lower leg   (+) Primary osteoarthritis of right knee    "   HEENT   (+) Mixed hearing loss, bilateral   (+) Sensorineural hearing loss (SNHL) of right ear with restricted hearing of left ear      GYN   (+) Malignant neoplasm of central portion of right breast in female, estrogen receptor positive (Multi)       Clinical information reviewed:   Tobacco  Allergies  Meds  Problems  Med Hx  Surg Hx  OB Status    Fam Hx  Soc Hx        NPO Detail:  NPO/Void Status  Carbohydrate Drink Given Prior to Surgery? : N  Date of Last Liquid: 06/10/24  Time of Last Liquid: 0515  Date of Last Solid: 06/09/24  Time of Last Solid: 2130  Last Intake Type: Clear fluids  Time of Last Void: 0500         Physical Exam    Airway  Mallampati: II  TM distance: >3 FB  Neck ROM: full  Comments: Micrognathia- Not present  Mouth opening adequate  Difficult airway anticipated- No   Cardiovascular   Rhythm: regular  Rate: normal     Dental - normal exam     Pulmonary   Comments: Non labored respiration   Abdominal            Anesthesia Plan    History of general anesthesia?: yes  History of complications of general anesthesia?: no    ASA 3     general   (General with ETT. Standard ASA monitoring)  intravenous induction   Postoperative administration of opioids is intended.  Anesthetic plan and risks discussed with patient.    Plan discussed with CRNA and CAA.

## 2024-06-10 ENCOUNTER — HOSPITAL ENCOUNTER (OUTPATIENT)
Facility: HOSPITAL | Age: 59
Setting detail: OUTPATIENT SURGERY
Discharge: HOME | End: 2024-06-10
Attending: SURGERY | Admitting: SURGERY
Payer: COMMERCIAL

## 2024-06-10 ENCOUNTER — ANESTHESIA (OUTPATIENT)
Dept: OPERATING ROOM | Facility: HOSPITAL | Age: 59
End: 2024-06-10
Payer: COMMERCIAL

## 2024-06-10 ENCOUNTER — HOSPITAL ENCOUNTER (OUTPATIENT)
Dept: RADIOLOGY | Facility: HOSPITAL | Age: 59
Setting detail: OUTPATIENT SURGERY
Discharge: HOME | End: 2024-06-10
Payer: COMMERCIAL

## 2024-06-10 VITALS
SYSTOLIC BLOOD PRESSURE: 142 MMHG | WEIGHT: 221.78 LBS | HEIGHT: 67 IN | HEART RATE: 65 BPM | OXYGEN SATURATION: 97 % | DIASTOLIC BLOOD PRESSURE: 72 MMHG | BODY MASS INDEX: 34.81 KG/M2 | TEMPERATURE: 97.3 F | RESPIRATION RATE: 16 BRPM

## 2024-06-10 DIAGNOSIS — C50.111 MALIGNANT NEOPLASM OF CENTRAL PORTION OF RIGHT BREAST IN FEMALE, ESTROGEN RECEPTOR POSITIVE (MULTI): ICD-10-CM

## 2024-06-10 DIAGNOSIS — Z17.0 MALIGNANT NEOPLASM OF CENTRAL PORTION OF RIGHT BREAST IN FEMALE, ESTROGEN RECEPTOR POSITIVE (MULTI): Primary | ICD-10-CM

## 2024-06-10 DIAGNOSIS — C50.111 MALIGNANT NEOPLASM OF CENTRAL PORTION OF RIGHT BREAST IN FEMALE, ESTROGEN RECEPTOR POSITIVE (MULTI): Primary | ICD-10-CM

## 2024-06-10 DIAGNOSIS — Z17.0 MALIGNANT NEOPLASM OF CENTRAL PORTION OF RIGHT BREAST IN FEMALE, ESTROGEN RECEPTOR POSITIVE (MULTI): ICD-10-CM

## 2024-06-10 PROCEDURE — 2500000005 HC RX 250 GENERAL PHARMACY W/O HCPCS: Performed by: SURGERY

## 2024-06-10 PROCEDURE — 7100000001 HC RECOVERY ROOM TIME - INITIAL BASE CHARGE: Performed by: SURGERY

## 2024-06-10 PROCEDURE — 2500000004 HC RX 250 GENERAL PHARMACY W/ HCPCS (ALT 636 FOR OP/ED): Performed by: SURGERY

## 2024-06-10 PROCEDURE — 38792 RA TRACER ID OF SENTINL NODE: CPT | Performed by: SURGERY

## 2024-06-10 PROCEDURE — 17999 UNLISTD PX SKN MUC MEMB SUBQ: CPT | Performed by: SURGERY

## 2024-06-10 PROCEDURE — 88307 TISSUE EXAM BY PATHOLOGIST: CPT | Performed by: STUDENT IN AN ORGANIZED HEALTH CARE EDUCATION/TRAINING PROGRAM

## 2024-06-10 PROCEDURE — C1713 ANCHOR/SCREW BN/BN,TIS/BN: HCPCS | Performed by: SURGERY

## 2024-06-10 PROCEDURE — 2500000004 HC RX 250 GENERAL PHARMACY W/ HCPCS (ALT 636 FOR OP/ED): Performed by: NURSE ANESTHETIST, CERTIFIED REGISTERED

## 2024-06-10 PROCEDURE — 3600000009 HC OR TIME - EACH INCREMENTAL 1 MINUTE - PROCEDURE LEVEL FOUR: Performed by: SURGERY

## 2024-06-10 PROCEDURE — C1889 IMPLANT/INSERT DEVICE, NOC: HCPCS | Performed by: SURGERY

## 2024-06-10 PROCEDURE — 2720000007 HC OR 272 NO HCPCS: Performed by: SURGERY

## 2024-06-10 PROCEDURE — 2500000005 HC RX 250 GENERAL PHARMACY W/O HCPCS: Performed by: NURSE ANESTHETIST, CERTIFIED REGISTERED

## 2024-06-10 PROCEDURE — 3700000002 HC GENERAL ANESTHESIA TIME - EACH INCREMENTAL 1 MINUTE: Performed by: SURGERY

## 2024-06-10 PROCEDURE — 7100000002 HC RECOVERY ROOM TIME - EACH INCREMENTAL 1 MINUTE: Performed by: SURGERY

## 2024-06-10 PROCEDURE — 2500000001 HC RX 250 WO HCPCS SELF ADMINISTERED DRUGS (ALT 637 FOR MEDICARE OP): Performed by: SURGERY

## 2024-06-10 PROCEDURE — 7100000010 HC PHASE TWO TIME - EACH INCREMENTAL 1 MINUTE: Performed by: SURGERY

## 2024-06-10 PROCEDURE — C1781 MESH (IMPLANTABLE): HCPCS | Performed by: SURGERY

## 2024-06-10 PROCEDURE — 38900 IO MAP OF SENT LYMPH NODE: CPT | Performed by: SURGERY

## 2024-06-10 PROCEDURE — 7100000009 HC PHASE TWO TIME - INITIAL BASE CHARGE: Performed by: SURGERY

## 2024-06-10 PROCEDURE — A4217 STERILE WATER/SALINE, 500 ML: HCPCS | Performed by: SURGERY

## 2024-06-10 PROCEDURE — 97165 OT EVAL LOW COMPLEX 30 MIN: CPT | Mod: GO

## 2024-06-10 PROCEDURE — 97535 SELF CARE MNGMENT TRAINING: CPT | Mod: GO

## 2024-06-10 PROCEDURE — 19357 TISS XPNDR PLMT BRST RCNSTJ: CPT | Performed by: SURGERY

## 2024-06-10 PROCEDURE — 3600000004 HC OR TIME - INITIAL BASE CHARGE - PROCEDURE LEVEL FOUR: Performed by: SURGERY

## 2024-06-10 PROCEDURE — 3700000001 HC GENERAL ANESTHESIA TIME - INITIAL BASE CHARGE: Performed by: SURGERY

## 2024-06-10 PROCEDURE — 88360 TUMOR IMMUNOHISTOCHEM/MANUAL: CPT | Mod: TC,AHULAB | Performed by: SURGERY

## 2024-06-10 PROCEDURE — 38525 BIOPSY/REMOVAL LYMPH NODES: CPT | Performed by: SURGERY

## 2024-06-10 PROCEDURE — 2500000004 HC RX 250 GENERAL PHARMACY W/ HCPCS (ALT 636 FOR OP/ED): Performed by: ANESTHESIOLOGY

## 2024-06-10 PROCEDURE — 38792 RA TRACER ID OF SENTINL NODE: CPT

## 2024-06-10 PROCEDURE — 3430000001 HC RX 343 DIAGNOSTIC RADIOPHARMACEUTICALS: Performed by: SURGERY

## 2024-06-10 PROCEDURE — 19303 MAST SIMPLE COMPLETE: CPT | Performed by: SURGERY

## 2024-06-10 PROCEDURE — 88307 TISSUE EXAM BY PATHOLOGIST: CPT | Mod: TC,59,AHULAB

## 2024-06-10 PROCEDURE — 12032 INTMD RPR S/A/T/EXT 2.6-7.5: CPT | Performed by: SURGERY

## 2024-06-10 PROCEDURE — 88342 IMHCHEM/IMCYTCHM 1ST ANTB: CPT | Performed by: STUDENT IN AN ORGANIZED HEALTH CARE EDUCATION/TRAINING PROGRAM

## 2024-06-10 PROCEDURE — A9520 TC99 TILMANOCEPT DIAG 0.5MCI: HCPCS | Performed by: SURGERY

## 2024-06-10 DEVICE — IMPLANTABLE DEVICE: Type: IMPLANTABLE DEVICE | Site: BREAST | Status: FUNCTIONAL

## 2024-06-10 RX ORDER — ONDANSETRON HYDROCHLORIDE 2 MG/ML
4 INJECTION, SOLUTION INTRAVENOUS ONCE AS NEEDED
Status: DISCONTINUED | OUTPATIENT
Start: 2024-06-10 | End: 2024-06-10 | Stop reason: HOSPADM

## 2024-06-10 RX ORDER — SCOLOPAMINE TRANSDERMAL SYSTEM 1 MG/1
1 PATCH, EXTENDED RELEASE TRANSDERMAL ONCE
Status: DISCONTINUED | OUTPATIENT
Start: 2024-06-10 | End: 2024-06-10 | Stop reason: HOSPADM

## 2024-06-10 RX ORDER — OXYCODONE HYDROCHLORIDE 5 MG/1
5 TABLET ORAL EVERY 4 HOURS PRN
Status: DISCONTINUED | OUTPATIENT
Start: 2024-06-10 | End: 2024-06-10 | Stop reason: HOSPADM

## 2024-06-10 RX ORDER — GLYCOPYRROLATE 0.2 MG/ML
INJECTION INTRAMUSCULAR; INTRAVENOUS AS NEEDED
Status: DISCONTINUED | OUTPATIENT
Start: 2024-06-10 | End: 2024-06-10

## 2024-06-10 RX ORDER — PHENYLEPHRINE HCL IN 0.9% NACL 1 MG/10 ML
SYRINGE (ML) INTRAVENOUS AS NEEDED
Status: DISCONTINUED | OUTPATIENT
Start: 2024-06-10 | End: 2024-06-10

## 2024-06-10 RX ORDER — GABAPENTIN 300 MG/1
600 CAPSULE ORAL ONCE
Status: COMPLETED | OUTPATIENT
Start: 2024-06-10 | End: 2024-06-10

## 2024-06-10 RX ORDER — SODIUM CHLORIDE, SODIUM LACTATE, POTASSIUM CHLORIDE, CALCIUM CHLORIDE 600; 310; 30; 20 MG/100ML; MG/100ML; MG/100ML; MG/100ML
100 INJECTION, SOLUTION INTRAVENOUS CONTINUOUS
Status: DISCONTINUED | OUTPATIENT
Start: 2024-06-10 | End: 2024-06-10 | Stop reason: HOSPADM

## 2024-06-10 RX ORDER — ACETAMINOPHEN 325 MG/1
975 TABLET ORAL ONCE
Status: COMPLETED | OUTPATIENT
Start: 2024-06-10 | End: 2024-06-10

## 2024-06-10 RX ORDER — ONDANSETRON HYDROCHLORIDE 2 MG/ML
INJECTION, SOLUTION INTRAVENOUS AS NEEDED
Status: DISCONTINUED | OUTPATIENT
Start: 2024-06-10 | End: 2024-06-10

## 2024-06-10 RX ORDER — CEFAZOLIN 1 G/1
INJECTION, POWDER, FOR SOLUTION INTRAVENOUS AS NEEDED
Status: DISCONTINUED | OUTPATIENT
Start: 2024-06-10 | End: 2024-06-10

## 2024-06-10 RX ORDER — PROPOFOL 10 MG/ML
INJECTION, EMULSION INTRAVENOUS AS NEEDED
Status: DISCONTINUED | OUTPATIENT
Start: 2024-06-10 | End: 2024-06-10

## 2024-06-10 RX ORDER — SODIUM CHLORIDE 0.9 G/100ML
IRRIGANT IRRIGATION AS NEEDED
Status: DISCONTINUED | OUTPATIENT
Start: 2024-06-10 | End: 2024-06-10 | Stop reason: HOSPADM

## 2024-06-10 RX ORDER — ROCURONIUM BROMIDE 10 MG/ML
INJECTION, SOLUTION INTRAVENOUS AS NEEDED
Status: DISCONTINUED | OUTPATIENT
Start: 2024-06-10 | End: 2024-06-10

## 2024-06-10 RX ORDER — SODIUM CHLORIDE, SODIUM LACTATE, POTASSIUM CHLORIDE, CALCIUM CHLORIDE 600; 310; 30; 20 MG/100ML; MG/100ML; MG/100ML; MG/100ML
50 INJECTION, SOLUTION INTRAVENOUS CONTINUOUS
Status: DISCONTINUED | OUTPATIENT
Start: 2024-06-10 | End: 2024-06-10 | Stop reason: HOSPADM

## 2024-06-10 RX ORDER — FENTANYL CITRATE 50 UG/ML
INJECTION, SOLUTION INTRAMUSCULAR; INTRAVENOUS AS NEEDED
Status: DISCONTINUED | OUTPATIENT
Start: 2024-06-10 | End: 2024-06-10

## 2024-06-10 RX ORDER — LIDOCAINE HYDROCHLORIDE 20 MG/ML
INJECTION, SOLUTION EPIDURAL; INFILTRATION; INTRACAUDAL; PERINEURAL AS NEEDED
Status: DISCONTINUED | OUTPATIENT
Start: 2024-06-10 | End: 2024-06-10

## 2024-06-10 RX ORDER — MIDAZOLAM HYDROCHLORIDE 1 MG/ML
INJECTION INTRAMUSCULAR; INTRAVENOUS AS NEEDED
Status: DISCONTINUED | OUTPATIENT
Start: 2024-06-10 | End: 2024-06-10

## 2024-06-10 RX ORDER — HYDRALAZINE HYDROCHLORIDE 20 MG/ML
5 INJECTION INTRAMUSCULAR; INTRAVENOUS EVERY 30 MIN PRN
Status: DISCONTINUED | OUTPATIENT
Start: 2024-06-10 | End: 2024-06-10 | Stop reason: HOSPADM

## 2024-06-10 RX ADMIN — Medication 200 MCG: at 09:21

## 2024-06-10 RX ADMIN — GLYCOPYRROLATE 0.2 MG: 0.2 INJECTION INTRAMUSCULAR; INTRAVENOUS at 07:50

## 2024-06-10 RX ADMIN — FENTANYL CITRATE 50 MCG: 50 INJECTION, SOLUTION INTRAMUSCULAR; INTRAVENOUS at 07:50

## 2024-06-10 RX ADMIN — SODIUM CHLORIDE, POTASSIUM CHLORIDE, SODIUM LACTATE AND CALCIUM CHLORIDE: 600; 310; 30; 20 INJECTION, SOLUTION INTRAVENOUS at 07:32

## 2024-06-10 RX ADMIN — ROCURONIUM BROMIDE 20 MG: 10 INJECTION, SOLUTION INTRAVENOUS at 09:08

## 2024-06-10 RX ADMIN — Medication 200 MCG: at 09:43

## 2024-06-10 RX ADMIN — HYDROMORPHONE HYDROCHLORIDE 0.5 MG: 1 INJECTION, SOLUTION INTRAMUSCULAR; INTRAVENOUS; SUBCUTANEOUS at 10:57

## 2024-06-10 RX ADMIN — PROPOFOL 200 MG: 10 INJECTION, EMULSION INTRAVENOUS at 07:50

## 2024-06-10 RX ADMIN — ONDANSETRON 4 MG: 2 INJECTION INTRAMUSCULAR; INTRAVENOUS at 07:50

## 2024-06-10 RX ADMIN — DEXAMETHASONE SODIUM PHOSPHATE 8 MG: 4 INJECTION, SOLUTION INTRA-ARTICULAR; INTRALESIONAL; INTRAMUSCULAR; INTRAVENOUS; SOFT TISSUE at 07:50

## 2024-06-10 RX ADMIN — Medication 1 MILLICURIE: at 08:01

## 2024-06-10 RX ADMIN — SCOPALAMINE 1 PATCH: 1 PATCH, EXTENDED RELEASE TRANSDERMAL at 06:39

## 2024-06-10 RX ADMIN — CEFAZOLIN 2 G: 330 INJECTION, POWDER, FOR SOLUTION INTRAMUSCULAR; INTRAVENOUS at 07:40

## 2024-06-10 RX ADMIN — ACETAMINOPHEN 975 MG: 325 TABLET ORAL at 06:39

## 2024-06-10 RX ADMIN — SUGAMMADEX 200 MG: 100 INJECTION, SOLUTION INTRAVENOUS at 10:21

## 2024-06-10 RX ADMIN — FENTANYL CITRATE 50 MCG: 50 INJECTION, SOLUTION INTRAMUSCULAR; INTRAVENOUS at 08:20

## 2024-06-10 RX ADMIN — LIDOCAINE HYDROCHLORIDE 50 MG: 20 INJECTION, SOLUTION EPIDURAL; INFILTRATION; INTRACAUDAL; PERINEURAL at 07:50

## 2024-06-10 RX ADMIN — SODIUM CHLORIDE, POTASSIUM CHLORIDE, SODIUM LACTATE AND CALCIUM CHLORIDE 50 ML/HR: 600; 310; 30; 20 INJECTION, SOLUTION INTRAVENOUS at 06:39

## 2024-06-10 RX ADMIN — GABAPENTIN 600 MG: 300 CAPSULE ORAL at 06:39

## 2024-06-10 RX ADMIN — MIDAZOLAM HYDROCHLORIDE 2 MG: 1 INJECTION, SOLUTION INTRAMUSCULAR; INTRAVENOUS at 07:35

## 2024-06-10 RX ADMIN — ROCURONIUM BROMIDE 50 MG: 10 INJECTION, SOLUTION INTRAVENOUS at 07:50

## 2024-06-10 ASSESSMENT — PAIN - FUNCTIONAL ASSESSMENT
PAIN_FUNCTIONAL_ASSESSMENT: 0-10

## 2024-06-10 ASSESSMENT — PAIN SCALES - GENERAL
PAINLEVEL_OUTOF10: 2
PAINLEVEL_OUTOF10: 2
PAINLEVEL_OUTOF10: 7
PAINLEVEL_OUTOF10: 4
PAINLEVEL_OUTOF10: 4
PAINLEVEL_OUTOF10: 7
PAINLEVEL_OUTOF10: 7
PAINLEVEL_OUTOF10: 0 - NO PAIN
PAINLEVEL_OUTOF10: 3
PAINLEVEL_OUTOF10: 2

## 2024-06-10 ASSESSMENT — PAIN DESCRIPTION - ORIENTATION: ORIENTATION: RIGHT

## 2024-06-10 ASSESSMENT — PAIN DESCRIPTION - LOCATION: LOCATION: BREAST

## 2024-06-10 ASSESSMENT — COLUMBIA-SUICIDE SEVERITY RATING SCALE - C-SSRS
1. IN THE PAST MONTH, HAVE YOU WISHED YOU WERE DEAD OR WISHED YOU COULD GO TO SLEEP AND NOT WAKE UP?: NO
2. HAVE YOU ACTUALLY HAD ANY THOUGHTS OF KILLING YOURSELF?: NO
6. HAVE YOU EVER DONE ANYTHING, STARTED TO DO ANYTHING, OR PREPARED TO DO ANYTHING TO END YOUR LIFE?: NO
1. IN THE PAST MONTH, HAVE YOU WISHED YOU WERE DEAD OR WISHED YOU COULD GO TO SLEEP AND NOT WAKE UP?: NO
6. HAVE YOU EVER DONE ANYTHING, STARTED TO DO ANYTHING, OR PREPARED TO DO ANYTHING TO END YOUR LIFE?: NO
2. HAVE YOU ACTUALLY HAD ANY THOUGHTS OF KILLING YOURSELF?: NO

## 2024-06-10 ASSESSMENT — ACTIVITIES OF DAILY LIVING (ADL): HOME_MANAGEMENT_TIME_ENTRY: 12

## 2024-06-10 NOTE — ANESTHESIA PROCEDURE NOTES
Airway  Date/Time: 6/10/2024 7:53 AM  Urgency: elective    Airway not difficult    Staffing  Performed: CRNA   Authorized by: Yulissa Roberto MD    Performed by: ARACELI Bernard-FABI, DNP  Patient location during procedure: OR    Indications and Patient Condition  Indications for airway management: anesthesia and airway protection  Spontaneous ventilation: present  Sedation level: deep  Preoxygenated: yes  Patient position: sniffing  MILS maintained throughout  Mask difficulty assessment: 1 - vent by mask    Final Airway Details  Final airway type: endotracheal airway      Successful airway: ETT  Cuffed: yes   Successful intubation technique: direct laryngoscopy  Facilitating devices/methods: intubating stylet  Endotracheal tube insertion site: oral  Blade: Irina  Blade size: #3  ETT size (mm): 7.5  Cormack-Lehane Classification: grade IIa - partial view of glottis  Placement verified by: chest auscultation and capnometry   Cuff volume (mL): 6  Measured from: lips  ETT to lips (cm): 21  Number of attempts at approach: 1  Ventilation between attempts: none  Number of other approaches attempted: 0

## 2024-06-10 NOTE — ANESTHESIA POSTPROCEDURE EVALUATION
Patient: Scott Leung    Procedure Summary       Date: 06/10/24 Room / Location: [facility] A OR 03 / Virtual [facility] OR    Anesthesia Start: 0732 Anesthesia Stop: 1048    Procedures:       Right Breast Skin Sparing Mastectomy; Intra-Operative Lymphatic Mapping; Axillary [place] Lymph Node Biopsy (Right: Breast)      Right Breast Insertion Tissue Expander; Placement of Mesh (Right: Breast) Diagnosis:       Malignant neoplasm of central portion of right breast in female, estrogen receptor positive (Multi)      (Malignant neoplasm of central portion of right breast in female, estrogen receptor positive (CMS/HCC) [C50.111, Z17.0])    Surgeons: Minal Aaron MD; Danica Torres MD Responsible Provider: Yulissa Roberto MD    Anesthesia Type: general ASA Status: 3            Anesthesia Type: general    Vitals Value Taken Time   /72 06/10/24 1117   Temp 36.3 °C (97.3 °F) 06/10/24 1040   Pulse 66 06/10/24 1126   Resp 15 06/10/24 1115   SpO2 87 % 06/10/24 1126   Vitals shown include unfiled device data.    Anesthesia Post Evaluation    Patient location during evaluation: PACU  Patient participation: complete - patient participated  Level of consciousness: awake  Pain management: satisfactory to patient  Multimodal analgesia pain management approach  Airway patency: patent  Cardiovascular status: hemodynamically stable  Respiratory status: spontaneous ventilation  Hydration status: acceptable  Postoperative Nausea and Vomiting: none        There were no known notable events for this encounter.

## 2024-06-10 NOTE — PROGRESS NOTES
Occupational Therapy                 Therapy Communication Note    Patient Name: Scott Leung  MRN: [id number]  Today's Date: 6/10/2024     Discipline: Occupational Therapy    Missed Visit Reason: Missed Visit Reason: Other (Comment) (Pt seen in PACU for OT eval and tx. Pt instrucvtedin precautions, exercise and ADLS. Booklet with all of the above issued)

## 2024-06-10 NOTE — OP NOTE
Right Breast Skin Sparing Mastectomy; Intra-Operative Lymphatic Mapping; Axillary [place] Lymph Node Biopsy (R) Operative Note     Date: 6/10/2024  OR Location: [facility] A OR    Name: Scott Leung, : 1965, Age: 59 y.o., MRN: [id number], Sex: female    Diagnosis  Pre-op Diagnosis     * Malignant neoplasm of central portion of right breast in female, estrogen receptor positive (Multi) [C50.111, Z17.0] Post-op Diagnosis     * Malignant neoplasm of central portion of right breast in female, estrogen receptor positive (Multi) [C50.111, Z17.0]     Procedures  Right Breast Skin Sparing Mastectomy; Intra-Operative Lymphatic Mapping; Axillary [place] Lymph Node Biopsy  19010 - MI MASTECTOMY SIMPLE COMPLETE    Right Breast Skin Sparing Mastectomy; Intra-Operative Lymphatic Mapping; Axillary [place] Lymph Node Biopsy  78031 - MI BX/EXC LYMPH NODE OPEN DEEP AXILLARY NODE      MI UNLISTED PROCEDURE BREAST [30731]  Surgeons   Panel 1:     * Minal Aaron - Primary  Panel 2:     * Danica Torres - Primary    Resident/Fellow/Other Assistant:  Surgeons and Role:  * No surgeons found with a matching role *    Procedure Summary  Anesthesia: General  ASA: III  Anesthesia Staff: Anesthesiologist: Yulissa Roberto MD  CRNA: ARACELI Bernard-FABI, DNP  C-AA: DIRK [place]  Estimated Blood Loss: 15mL  Intra-op Medications: bupivicaine    Specimen:   ID Type Source Tests Collected by Time   1 : RIGHT AXILLARY SENTINEL LYMPH NODE #1, HOTTEST Tissue SENTINEL LYMPH NODE BREAST RIGHT SURGICAL PATHOLOGY EXAM Minal Aaron MD 6/10/2024 0820   2 : RIGHT AXILLARY ADDITIONAL SENTINEL LYMPH NODES Tissue SENTINEL LYMPH NODE BREAST RIGHT SURGICAL PATHOLOGY EXAM Minal Aaron MD 6/10/2024 0831   3 : RIGHT SKIN SPARING MASTECTOMY, STITCH AT 12:00 Tissue BREAST MASTECTOMY RIGHT SURGICAL PATHOLOGY EXAM Minal Aaron MD 6/10/2024 0815         Staff:   Michaelulator: Ayse Guzman Person: Blanca Guzman Person: Meche Walton Scrub:  Gerri  Circulator: Patsy         Drains: none by me        Implants: none by me    Indications: Scott Leung is an 59 y.o. female who is having surgery for Malignant neoplasm of central portion of right breast in female, estrogen receptor positive (CMS/HCC) [C50.111, Z17.0].       The patient was seen in the preoperative area. The risks, benefits, complications, treatment options, non-operative alternatives, expected recovery and outcomes were discussed with the patient. The possibilities of reaction to medication, pulmonary aspiration, injury to surrounding structures, bleeding, recurrent infection, the need for additional procedures, failure to diagnose a condition, and creating a complication requiring transfusion or operation were discussed with the patient. The patient concurred with the proposed plan, giving informed consent.  The site of surgery was properly noted/marked if necessary per policy. The patient has been actively warmed in preoperative area. Preoperative antibiotics have been ordered and given within 1 hours of incision. Venous thrombosis prophylaxis have been ordered including bilateral sequential compression devices    Procedure Details:   After informed consent was obtained and the appropriate breast was marked the patient was transferred to the operating room.  She was positioned on the operating room table in a supine position with her pressure points padded appropriately. A surgical time out was performed with the OR team.  General anesthesia was induced and she received perioperative antibiotics in the form of Ancef.    I proceeded with intraoperative lymphatic mapping by injecting her right breast around the border of the areola transdermally from 9-12 o'clock with 1.0 millicurie of technetium-99 tilmanocept (Lymphoseek). I then injected her right breast subareolar with 2.5 mL of Lymphazurin blue dye.  Her right breast was gently massaged for 5 minutes.    Dr. Torres had seen the patient  "preoperatively and marked the boundaries of the breast as well as the periarolar skin incision.     I first turned my attention to the intraoperative lymphatic mapping and sentinel node biopsy.  Using the Neoprobe, I was able to confirm there was uptake of technetium in the right axilla. An axillary incision was created with a scalpel and dissection was taken down through her soft tissues using electrocautery.  The clavipectoral fascia was identified and divided and the axilla was entered.  I identified a hot spot up against the chest wall in level I.  This tissue was grasped, elevated and excised.  This was associated with a lymph node that had uptake of blue dye and technetium.  Ex Vivo count of 235, this was sent as \"right axillary sentinel lymph node #1, hottest.\"  I identified 2 other lymph nodes that had uptake of technetium that had ex Vivo counts lower than this.  They were all sent together as \"right axillary additional sentinel lymph nodes.\"  A lap was tucked into the axilla. All specimens were sent for permanent.    I then turned my attention to the right skin-sparing mastectomy.  Tumescent solution was injected in the space between the subcutaneous fat and the breast tissue in all directions and given time to work.  The entire skin incision was created with a scalpel and the skin flaps were raised sharply, using scissors, from the lateral border of the sternum medially, to clavicle superiorly, to the anterior border of latissimus laterally, to the inframammary fold inferiorly.  Once all the skin flaps have been raised, the breast was removed from the underlying pectoralis muscle, to include pectoralis fascia, using electrocautery.  Once the breast was completely excised, it was marked with a stitch at 12 o'clock and sent to Pathology fresh labeled \"right skin-sparing mastectomy, stitch at 12 o'clock.\" The lap was removed from the axilla.    Dr. Torres joined us in the operating room to complete the tissue " expander reconstruction and closure.  At the end of my portion of the case, the patient was tolerating general anesthesia without incident and the instrument and sponge counts were correct.    The procedure was made technically difficult due to skin sparing nature of the mastectomy.    Complications:  None; patient tolerated the procedure well.    Disposition:  stable, intubated in the OR for Dr. Torres's portion of the procedure  Condition: stable     [place] Node Biopsy for Breast Cancer  Operation performed with curative intent Yes   Tracer(s) used to identify sentinel nodes in the upfront surgery (non-neoadjuvant) setting Dye and Radioactive tracer   Tracer(s) used to identify sentinel nodes in the neoadjuvant setting N/A   All nodes (colored or non-colored) present at the end of a dye-filled lymphatic channel were removed Yes   All significantly radioactive nodes were removed Yes   All palpably suspicious nodes were removed N/A   Biopsy-proven positive nodes marked with clips prior to chemotherapy were identified and removed N/A       Attending Attestation: A qualified resident physician was not available.    Minal Aaron  Phone Number: [phone]

## 2024-06-10 NOTE — PERIOPERATIVE NURSING NOTE
1040: Phase 1 care begins  1057: 0.5 dilaudid given per emr order  1100: Pt family updated via message  1145: Phase 2 care begins

## 2024-06-10 NOTE — OP NOTE
Right Immediate Breast Reconstruction with Prepectoral Tissue Expander and Mesh Operative Note     Date: 6/10/2024  OR Location: [facility] OR    Name: Scott Leung, : 1965, Age: 59 y.o., MRN: [id number], Sex: female    Diagnosis  Pre-op Diagnosis     * Malignant neoplasm of central portion of right breast in female, estrogen receptor positive (Multi) [C50.111, Z17.0] Post-op Diagnosis     * Malignant neoplasm of central portion of right breast in female, estrogen receptor positive (Multi) [C50.111, Z17.0]     Procedures  Right Breast Insertion Tissue Expander; Placement of Mesh  53968 - SD TISSUE EXPANDER PLACEMENT BREAST RECONSTRUCTION   - Placement of synthetic mesh     18523 - Intermediate Repair 4 cm Right Axilla    Surgeons      * Danica Torres - Primary  Resident/Fellow/Other Assistant:  Surgeons and Role:  * No surgeons found with a matching role *    Procedure Summary  Anesthesia: General  ASA: III  Anesthesia Staff: Anesthesiologist: Yulissa Roberto MD  CRNA: ARACELI Bernard-FABI, DNP  C-AA: DIRK [place]  Estimated Blood Loss: 5 mL for Plastic Surgery Portion     Intra-op Medications:   Administrations occurring from 0730 to 1130 on 06/10/24:   Medication Name Total Dose   sodium chloride 0.9 % irrigation solution 1,000 mL   BUPivacaine-EPINEPHrine (Marcaine w/EPI) 60 mL in sodium chloride 0.9% 20 mL syringe 80 mL   EPINEPHrine (Adrenalin) 1 mg in lactated Ringer's 1,000 mL irrigation 1 mg   lactated Ringer's infusion Cannot be calculated            Anesthesia Record               Intraprocedure I/O Totals          Intake    lactated Ringer's infusion 500.00 mL    Total Intake 500 mL       Output    Urine 0 mL    Est. Blood Loss 50 mL    NG/OG Tube Output 50 mL    Other 0 mL    Total Output 100 mL       Net    Net Volume 400 mL            Staff:   Circulator: Ayse Dumontub Person: Blanca Dumontub Person: Meche Walton Scrub: Gerri  Circulator: Patsy     Drains and/or Catheters:    Closed/Suction Drain 1 Inferior;Lateral;Right Chest Bulb 19 Fr. (Active)   Dressing Status Clean;Dry 06/10/24 1210   Drainage Appearance Serosanguineous 06/10/24 1210   Status To bulb suction 06/10/24 1210   Output (mL) 5 mL 06/10/24 1210       Closed/Suction Drain 2 Inferior;Right Chest Bulb 19 Fr. (Active)   Dressing Status Clean;Dry 06/10/24 1210   Drainage Appearance Bright red 06/10/24 1145   Status To bulb suction 06/10/24 1210   Output (mL) 5 mL 06/10/24 1210     Implants:  Implants       Type Name Action Serial No.      Breast Sizer MENTOR ARTOURA PLUS, SMOOTH, HIGH PROFILE 475 cc Implanted [id number]     Breast Sizer TIGR MATRIX SURGICAL MESH Implanted               Findings: 475 cc smooth high profile [place] Artoura prepectoral tissue expander with TIGR mesh    Indications: Scott Leung is an 59 y.o. female who is having surgery for Malignant neoplasm of central portion of right breast in female, estrogen receptor positive (CMS/HCC) [C50.111, Z17.0].  She will be getting a right skin sparing mastectomy by Dr. Minal Aaron and sentinel lymph node biopsy.  She will be getting immediate alloplastic reconstruction with a prepectoral tissue expander and synthetic mesh placement by myself.    The patient was seen in the preoperative area. The risks, benefits, complications, treatment options, non-operative alternatives, expected recovery and outcomes were discussed with the patient. The risks of the procedure were discussed with her prior to surgery.  These include but are not limited to the risks of anesthesia, infection, bleeding, pain, need for further procedures, hematoma, seroma, wound healing complications, and asymmetry. The possibilities of reaction to medication, pulmonary aspiration, injury to surrounding structures, bleeding, recurrent infection, the need for additional procedures, failure to diagnose a condition, and creating a complication requiring transfusion or operation were discussed with  the patient. The patient concurred with the proposed plan, giving informed consent.  The site of surgery was properly noted/marked if necessary per policy. The patient has been actively warmed in preoperative area. Preoperative antibiotics have been ordered and given within 1 hours of incision. Venous thrombosis prophylaxis have been ordered including bilateral sequential compression devices     Procedure Details: The patient was identified and marked in the upright and standing position.  She was taken to the operative room and placed in the supine position.  A preoperative time was performed identifying the patient, procedeure and laterality.  An atraumatic endotracheal intubation was performed by the anesthesia team.  Her arms were padded and carefully secured to the arm boards, abducted less than 90 degrees. She was prepped and draped in the usual sterile fashion.      Dr. Minal Aaron then performed a right nipple sparing mastectomy and sentinel lymph node biopsy.  Please refer to a separately dictated operative note for details of her portion of the procedure.   The specimen weighed approximately 520 g.  The skin appeared well perfused.  An areolar incision with an inferior vertical extension had been used.   Based on her chest wall width and mastectomy specimen weight I used a 475 cc cc smooth high profile Artoura tissue expander.  On the back table I filled it with air and secured a 20 cm x 30 cm piece of TIGR mesh and secured around the anterior and inferior posterior pole, creating a pocket with a superior extension to help tent the superior pole. I shaped the mesh around the expander, making small slits for the tabs of the expander to extend through.  I soaked this construct in Irrisept.      Next, I obtained hemostasis.  I diluted 60 cc of 0.50% Marcaine with epinephrine with 20 cc of normal saline.  I used this to place a chest wall block including a pectoralis block intercostal blocks as well as field  blocks on the chest wall sentinel lymph node site and ELIAS site.   I copiously irrigated the pocket with normal saline to wash out any free particles of fat. Then I irrigated the pocket with a bottle of Irrisept. I  used interrupted 2-0 Vicryl to resuspend the lateral chest wall tissue to the chest wall.   Next, I deflated the construct and placed on the chest wall.  The construct was then secured to the chest wall using 2-0 Nylon on all its tabs.  I then re-inflated the tissue expander with air, and tented and secured the superior portion of the mesh construct to the superior edge of the pocket with 2-0 Vicryl.  Two 19 [place] citizen Castillo drains were placed.  The mastectomy incision was freshened using curved iris scissors. In order to decrease tension on the closure, all of the air was removed from the tissue expander.  The incision was then closed in multiple layers using 3-0 Monocryl in the parenchyma as well as interrupted buried dermals and a running 4-0 Monocryl subcuticular.       The lymphadenectomy site in the right axilla was left open I could place the Marcaine block there.  It measured 4 cm in length.   The superficial fascia together using interrupted 2-0 Vicryl.  This is to prevent tethering of the scar and dermis to the chest wall.  I then closed the dermis with 3-0 Monocryl interrupted buried dermals-closing the incision with a running 4-0 Monocryl subcuticular. The axillary incision was dressed with skin glue and 1 inch Steri-Strips.  A Prevena Pat form VAC dressing was placed over the breast site. The drains were covered with CHG Tegaderm dressings.  The patient tolerated the procedure well she was awoken extubated and taken to the recovery room in good condition     Complications:  None; patient tolerated the procedure well.    Disposition: PACU - hemodynamically stable.  Condition: stable     Attending Attestation: I performed the procedure.    Danica Torres MD  Phone Number: [phone]

## 2024-06-10 NOTE — DISCHARGE INSTRUCTIONS
I will call you with your pathology report when it is available, usually 2 weeks after surgery.  You will have a post op visit to see me in clinic in about 2 weeks when we have the pathology to review. If this has not already been scheduled for you, please call [facility] to schedule [phone].  If you have questions or concerns when you are home, please call the office: [place] [phone]     Plastic Surgery Post Discharge Instructions:  You are being discharged from [facility] following breast surgery on 6/10/2024 with Dr. Torres of plastic surgery. Included below are post-discharge instructions and details regarding follow-up.     Thank you for allowing us to participate in your care and we wish you the best!    Best Regards,  [facility] Department of Plastic and Reconstructive Surgery    Activity:  Resume activity as tolerated postoperatively. No pushing, pulling or lifting objects greater than 5 pounds until your next outpatient appointment with plastic surgery. Ensure you are out of bed and ambulating (walking) periodically following surgery.     You may locally bathe following discharge, no showers until cleared to do so at your next follow up visit with plastic surgery. Avoid soaking or submerging surgical incisions/sites or wetting wound vac dressings.    Surgical Site/Wound Care:  Prevena/Pat wound vac: Please allow Pat Prevena wound vac dressing to remain in place overlying your breast surgical site until output follow-up with plastic surgery. When showering, do not allow the wound vac dressing or device to become wet. When resting, please make sure to plug in the device with the supplied power cord to maintain the battery. The device has a power button at the center which is encircled by green lights. There may be one less light illuminated each day (every 24 hrs) which is to be expected, and signifies the count down of the duration of the vac device. If you experience any  issues with your wound vac including alarms, malfunction or dressing issues please refer to the provided instruction manual or contact the plastic surgery office at [phone].      Keep your breast surgical sites dressed with the Pat wound vac dressings and otherwise leave open to air. No use of bras or highly compressive clothes until follow up.     Monitor surgical sites for changes in general appearance, color, temperature and turgor. Please additionally monitor for any developing signs of infection which may include increased redness, swelling, fever/chills, green/yellow drainage, or foul odor from surgical sites or wounds. If any signs of infection or changes in flap appearance are to occur, please immediately contact the plastic surgery office.     Drain care:  You are being discharged with 2 ELIAS drains which are inserted at your breast surgical site. To empty the drain, open the cap, tip into cup and squeeze to empty. Squeeze drain flat then replace the cap.  Please empty the drain and record its output 3 times a day and bring these numbers to your follow up appointment. The drain output should decrease and the color of the drainage should become lighter (red to pink to yellow). This drain is sutured into place. Keep the area around the drain clean and dry with the dressing intact. Call the office if you notice drastic changes in drain output, bloody drain output, or redness/drainage around insertion site.    Nutrition:  You may resume a regular diet following surgery. Ensure that you are drinking an adequate amount of fluids to maintain hydration. Continue adequate protein intake following surgery to promote wound healing.     Medication Instructions:  You may resume use of your home prescribed mediations as previously directed following discharge from the hospital. If you were taking medications prior to your surgery and they are not listed on your discharge homegoing instructions medications list,  consult your MD before you resume these medications.    You may resume taking aspirin postoperatively but hold use of home Anastrozole until cleared to resume on next follow up visit.     Some postoperative pain is not unusual. This is usually relieved by taking prescribed or over the counter Acetaminophen/Tylenol. Severe pain despite administration of pain medication must be reported to your physician.    Remember when taking Acetaminophen, do NOT exceed more than 1000 milligrams (mg) per dose or more than 4000 mg total per day. Taking too much Acetaminophen at one time can damage your liver. Call your MD if you have any questions about your medications. To prevent constipation postoperatively, ensure that you drink plenty of water, eat fiber rich foods (a good source is fruit) and increase activity progressively.    Call Physician If:  Contact the plastic surgery office for any questions and/or concerns regarding the surgical incision/site.  1. [phone] if Monday-Friday (8 a.m. - 4:30 p.m.)  2. [phone] and ask for the Plastic Surgery team on call provider if after hours or on weekends  3. Email [email or web address] for any non-urgent concerns.    Call your MD or seek immediate medical attention if you experience any of the following symptoms:  1. Fever of 101.5 (38.5 C) or greater  2. Pain not controlled with prescribed pain medications  3. Uncontrolled nausea and/or vomiting  4. Drainage or swelling around your incisions and/or surgical sites   5. Separation of incisions, or tearing of the incision line  6. Large fluid collection under or around the incision sites   7. Difficulty breathing  8. Swelling, pain, heat and/or redness in your legs and/or calves  9. Inability to tolerate diet/fluid intake    Follow-up/Post Discharge Appointments:  Follow-up care is a key part of your treatment and safety. It is very important that you maintain follow-up care as directed so that your surgical site  heals properly and does not lead to problems. Always carry a current medication list with you and bring it to ALL healthcare Provider visits. Be sure to maintain follow up with plastic surgery at your scheduled appointment. If you are unable to keep your appointment, or need to reschedule please contact our office at [phone].

## 2024-06-10 NOTE — INTERVAL H&P NOTE
H&P reviewed. The patient was examined and there are no changes to the H&P.  Plan today for right skin sparing mastectomy, intraoperative lymphatic mapping, axillary sentinel lymph node biopsy by Galen and immediate breast reconstruction with tissue expander placement by Melissa.  Minal Aaron MD

## 2024-06-12 DIAGNOSIS — F41.8 OTHER SPECIFIED ANXIETY DISORDERS: ICD-10-CM

## 2024-06-13 RX ORDER — ESCITALOPRAM OXALATE 10 MG/1
5 TABLET ORAL DAILY
Qty: 45 TABLET | Refills: 3 | Status: SHIPPED | OUTPATIENT
Start: 2024-06-13

## 2024-06-14 ENCOUNTER — TELEPHONE (OUTPATIENT)
Dept: PLASTIC SURGERY | Facility: CLINIC | Age: 59
End: 2024-06-14
Payer: COMMERCIAL

## 2024-06-14 NOTE — TELEPHONE ENCOUNTER
Reached out to patient to check on her before the weekend. She is doing well. Pain is under control with prescribed medications. ELIAS drainage is WNL. She is tracking her protein intake and taking shakes for supplementation. She was instructed to call with any issues.

## 2024-06-17 NOTE — PROGRESS NOTES
PLASTIC SURGERY CLINIC VISIT  POSTOP BREAST RECONSTRUCTION     Date: 6/18/24  Date of Surgery: 6/10/24  Surgical Procedure: Right mastectomy with placement of tissue expander        HPI:   Scott Leung 59 y.o. female is here for post-operative appointment for the above procedure(s).      Interval changes as of this date:   6/18: Pt here for her first post op visit. Doing well overall. Pain controlled. Endorses adequate protein intake. Doing her best to follow activity restrictions.      MEDICATIONS  Current Outpatient Medications:     amLODIPine (Norvasc) 10 mg tablet, TAKE 1 TABLET BY MOUTH EVERY DAY, Disp: 90 tablet, Rfl: 1    ASPIRIN LOW-STRENGTH ORAL, Take 81 mg by mouth every other day., Disp: , Rfl:     cholecalciferol (Vitamin D-3) 25 MCG (1000 UT) capsule, Take 1 capsule (25 mcg) by mouth once daily., Disp: , Rfl:     diazePAM (Valium) 5 mg tablet, Take 1 tablet (5 mg) by mouth 1 time for 1 dose., Disp: 1 tablet, Rfl: 0    escitalopram (Lexapro) 10 mg tablet, Take 0.5 tablets (5 mg) by mouth once daily., Disp: 45 tablet, Rfl: 3    gabapentin (Neurontin) 300 mg capsule, Take 1 capsule (300 mg) by mouth every 8 hours for 14 days., Disp: 42 capsule, Rfl: 0    hydrocortisone 2.5 % cream, Hydrocortisone 2.5 % External Cream Quantity: 56  Refills: 0  Start: 9-Aug-2021, Disp: , Rfl:     levothyroxine (Synthroid, Levoxyl) 25 mcg tablet, TAKE 1 TABLET (25 MCG) BY MOUTH ONCE DAILY IN THE MORNING., Disp: 90 tablet, Rfl: 1    losartan (Cozaar) 100 mg tablet, TAKE 1 TABLET BY MOUTH EVERY DAY, Disp: 90 tablet, Rfl: 0    propranolol (Inderal) 10 mg tablet, TAKE 1 TABLET BY MOUTH TWICE A DAY, Disp: 180 tablet, Rfl: 1    rizatriptan MLT (Maxalt-MLT) 10 mg disintegrating tablet, Take 1 tablet (10 mg) by mouth 1 time if needed for migraine. May repeat once in 2 hours if needed (Maximum 2 tablets in 24 hours), Disp: 9 tablet, Rfl: 1    rosuvastatin (Crestor) 20 mg tablet, TAKE 1 TABLET BY MOUTH EVERY DAY, Disp: 90 tablet,  Rfl: 1      OBJECTIVE [x]Expand by Default  There were no vitals taken for this visit.     REVIEW OF SYSTEMS:    Constitutional: negative for fevers, chills, unintentional weight loss  HEENT: negative for changes in vision, headaches, changes in hearing, congestion, sore throat  Cardiovascular: negative for chest pain, palpitations  Respiratory: negative for cough, shortness of breath  Gastrointestinal: negative for nausea, vomiting, diarrhea  Genitourinary: negative for dysuria, hematuria  Musculoskeletal: negative for joint swelling or pain  Skin: negative for rashes or lesions  Psych: negative for depression, anxiety  Endocrine: negative for polyuria, polydipsia, cold/heat intolerance  Hem/Lymph: negative for bleeding disorder     PHYSICAL EXAM  General: alert and oriented, no apparent distress    Focused exam of the breasts:  Right: Vertical incision C/D/I. Some darkened issue noted to mid portion of incision. Flaps viable. No seroma or fluid collection. Jpx2 with SS output.      ASSESSMENT/PLAN  Scott Leung 59 y.o. female who had right mastectomy with placement of tissue expander on 6/10/24 who presents for POV.     Doing well post operatively, pain well controlled. Endorses adequate protein intake. Following activity restrictions.      Prevena incisional vac removed at bedside. ELIAS drain output remains too elevated for removal at today's visit.      Continue activity restrictions and increased protein intake  OK to shower, instructed to blow-dry steri strips on cool setting following shower  Continue ELIAS drain care    RTC in 1 week for wound evaluation and ELIAS removal     Shannon Morrison PA-C

## 2024-06-18 ENCOUNTER — APPOINTMENT (OUTPATIENT)
Dept: PLASTIC SURGERY | Facility: CLINIC | Age: 59
End: 2024-06-18
Payer: COMMERCIAL

## 2024-06-18 VITALS
DIASTOLIC BLOOD PRESSURE: 75 MMHG | WEIGHT: 229 LBS | HEART RATE: 69 BPM | SYSTOLIC BLOOD PRESSURE: 150 MMHG | BODY MASS INDEX: 35.94 KG/M2 | HEIGHT: 67 IN

## 2024-06-18 DIAGNOSIS — Z98.890 S/P BREAST RECONSTRUCTION: Primary | ICD-10-CM

## 2024-06-21 NOTE — PROGRESS NOTES
PLASTIC SURGERY CLINIC VISIT  POSTOP BREAST RECONSTRUCTION     Date: 6/25/24  Date of Surgery: 6/10/24  Surgical Procedure: Right mastectomy with placement of tissue expander        HPI:   Scott Leung 59 y.o. female is here for post-operative appointment for the above procedure(s).      Interval changes as of this date:   6/18: Pt here for her first post op visit. Doing well overall. Pain controlled. Endorses adequate protein intake. Doing her best to follow activity restrictions.   6/25 Doing well overall; no c/o pain.  ELIAS drains remain in place     MEDICATIONS  Current Outpatient Medications:     amLODIPine (Norvasc) 10 mg tablet, TAKE 1 TABLET BY MOUTH EVERY DAY, Disp: 90 tablet, Rfl: 1    anastrozole (Arimidex) 1 mg tablet, TAKE 1 TABLET (1 MG TOTAL) BY MOUTH ONCE DAILY. SWALLOW WHOLE WITH A DRINK OF WATER., Disp: 30 tablet, Rfl: 0    ASPIRIN LOW-STRENGTH ORAL, Take 81 mg by mouth every other day., Disp: , Rfl:     cholecalciferol (Vitamin D-3) 25 MCG (1000 UT) capsule, Take 1 capsule (25 mcg) by mouth once daily., Disp: , Rfl:     diazePAM (Valium) 5 mg tablet, Take 1 tablet (5 mg) by mouth 1 time for 1 dose., Disp: 1 tablet, Rfl: 0    escitalopram (Lexapro) 10 mg tablet, Take 0.5 tablets (5 mg) by mouth once daily., Disp: 45 tablet, Rfl: 3    gabapentin (Neurontin) 300 mg capsule, Take 1 capsule (300 mg) by mouth every 8 hours for 14 days., Disp: 42 capsule, Rfl: 0    hydrocortisone 2.5 % cream, Hydrocortisone 2.5 % External Cream Quantity: 56  Refills: 0  Start: 9-Aug-2021, Disp: , Rfl:     levothyroxine (Synthroid, Levoxyl) 25 mcg tablet, TAKE 1 TABLET (25 MCG) BY MOUTH ONCE DAILY IN THE MORNING., Disp: 90 tablet, Rfl: 1    losartan (Cozaar) 100 mg tablet, TAKE 1 TABLET BY MOUTH EVERY DAY, Disp: 90 tablet, Rfl: 0    propranolol (Inderal) 10 mg tablet, TAKE 1 TABLET BY MOUTH TWICE A DAY, Disp: 180 tablet, Rfl: 1    rizatriptan MLT (Maxalt-MLT) 10 mg disintegrating tablet, Take 1 tablet (10 mg) by mouth  "1 time if needed for migraine. May repeat once in 2 hours if needed (Maximum 2 tablets in 24 hours), Disp: 9 tablet, Rfl: 1    rosuvastatin (Crestor) 20 mg tablet, TAKE 1 TABLET BY MOUTH EVERY DAY, Disp: 90 tablet, Rfl: 1      OBJECTIVE [x]Expand by Default  Height 1.702 m (5' 7\"), weight 103 kg (228 lb).    REVIEW OF SYSTEMS:    Constitutional: negative for fevers, chills, unintentional weight loss  HEENT: negative for changes in vision, headaches, changes in hearing, congestion, sore throat  Cardiovascular: negative for chest pain, palpitations  Respiratory: negative for cough, shortness of breath  Gastrointestinal: negative for nausea, vomiting, diarrhea  Genitourinary: negative for dysuria, hematuria  Musculoskeletal: negative for joint swelling or pain  Skin: negative for rashes or lesions  Psych: negative for depression, anxiety  Endocrine: negative for polyuria, polydipsia, cold/heat intolerance  Hem/Lymph: negative for bleeding disorder     PHYSICAL EXAM  General: alert and oriented, no apparent distress    Focused exam of the breasts:  Right: incisions c/d/I     ASSESSMENT/PLAN  Scott Leung 59 y.o. female who had right mastectomy with placement of tissue expander on 6/10/24 who presents for POV.     ELIAS drain(s) in place. No erythema or edema surrounding the drain site. There is serous output from the drain. Patient recorded output of the drain #2 showed 2 consecutive days of less than 30cc output at time of removal. Patient was educated on purpose of surgical drains and informed of risk for seroma post drain removal.       ELIAS drain(s) removed at this visit: #2; bacitracin applied  ELIAS drain #1 remains intact. New CHG dressing applied. Steri strips applied.     Continue with activity restrictions  Continue with protein intact     RTC 1 week.       Attending Attestation:  Danica MORFIN MD, personal performed the history, exam, and decision making on this patient.    "

## 2024-06-24 DIAGNOSIS — C50.111 MALIGNANT NEOPLASM OF CENTRAL PORTION OF RIGHT BREAST IN FEMALE, ESTROGEN RECEPTOR POSITIVE (MULTI): ICD-10-CM

## 2024-06-24 DIAGNOSIS — Z17.0 MALIGNANT NEOPLASM OF CENTRAL PORTION OF RIGHT BREAST IN FEMALE, ESTROGEN RECEPTOR POSITIVE (MULTI): ICD-10-CM

## 2024-06-25 ENCOUNTER — APPOINTMENT (OUTPATIENT)
Dept: PLASTIC SURGERY | Facility: CLINIC | Age: 59
End: 2024-06-25
Payer: COMMERCIAL

## 2024-06-25 VITALS — BODY MASS INDEX: 35.79 KG/M2 | HEIGHT: 67 IN | WEIGHT: 228 LBS

## 2024-06-25 DIAGNOSIS — C50.412 MALIGNANT NEOPLASM OF UPPER-OUTER QUADRANT OF LEFT BREAST IN FEMALE, ESTROGEN RECEPTOR NEGATIVE (MULTI): ICD-10-CM

## 2024-06-25 DIAGNOSIS — Z17.0 MALIGNANT NEOPLASM OF CENTRAL PORTION OF RIGHT BREAST IN FEMALE, ESTROGEN RECEPTOR POSITIVE (MULTI): ICD-10-CM

## 2024-06-25 DIAGNOSIS — Z17.1 MALIGNANT NEOPLASM OF UPPER-OUTER QUADRANT OF LEFT BREAST IN FEMALE, ESTROGEN RECEPTOR NEGATIVE (MULTI): ICD-10-CM

## 2024-06-25 DIAGNOSIS — C50.111 MALIGNANT NEOPLASM OF CENTRAL PORTION OF RIGHT BREAST IN FEMALE, ESTROGEN RECEPTOR POSITIVE (MULTI): ICD-10-CM

## 2024-06-25 PROCEDURE — 99024 POSTOP FOLLOW-UP VISIT: CPT | Performed by: SURGERY

## 2024-06-25 RX ORDER — SULFAMETHOXAZOLE AND TRIMETHOPRIM 800; 160 MG/1; MG/1
1 TABLET ORAL 2 TIMES DAILY
Qty: 14 TABLET | Refills: 0 | Status: SHIPPED | OUTPATIENT
Start: 2024-06-25 | End: 2024-07-02

## 2024-06-27 ENCOUNTER — TUMOR BOARD CONFERENCE (OUTPATIENT)
Dept: HEMATOLOGY/ONCOLOGY | Facility: HOSPITAL | Age: 59
End: 2024-06-27
Payer: COMMERCIAL

## 2024-06-27 PROCEDURE — 88360 TUMOR IMMUNOHISTOCHEM/MANUAL: CPT | Performed by: STUDENT IN AN ORGANIZED HEALTH CARE EDUCATION/TRAINING PROGRAM

## 2024-06-27 NOTE — PROGRESS NOTES
Subjective   Scott Leung is a 59 y.o. Postmenopausal female returns to the [facility] breast center for a post op visit. She has been recovering well since surgery and has no breast specific complaints today.    Diagnosis date: 3/1/24 right breast invasive lobular carcinoma, grade 2, ER >95% 3+, HI 0%, HER2 1+, cT2N0, stage IIA      Cancer Staging   Malignant neoplasm of central portion of right breast in female, estrogen receptor positive (Multi)  Staging form: Breast, AJCC 8th Edition  - Clinical stage from 3/1/2024: Stage IIA - Signed by Minal Aaron MD on 3/9/2024  cT2  cN0  cM0  [place] grade: G2  ER Status: Positive  HI Status: Negative  HER2 Status: Negative  - Pathologic stage from 6/10/2024: Stage IA - Signed by Minal Aaron MD on 2024  pT2  pN0  cM0  [place] grade: G2  ER Status: Positive  HI Status: Positive  HER2 Status: Negative     Scott Leung presented at the age of 59 with a right palpable breast cancer. Diagnostic imaging demonstrated at central/subareolar position there is a 2.9 cm irregular, nonparallel spiculated mass.  The area underwent US-guided core biopsy revealing a diagnosis of invasive lobular carcinoma. She denies skin changes or nipple discharge, but her nipple is flatter on this side. She has a family history of breast cancer in some paternal cousins. She underwent right skin sparing mastectomy, SLN and TE reconstruction with Dr. Torres on 6/10/24. Final surgical pathology demonstrated 4.2cm grade 2 ILC with an additional 0.1cm focus, 4 negative sentinel lymph nodes, and negative margins, pT2(m)N0, stage IA.    BREAST IMAGIN2024 Right diagnostic mammogram with ultrasound, indicates BI-RADS Category 4. Heterogeneously dense breast tissue. Suspicious right breast mass without axillary adenopathy. A surgical consultation for an ultrasound-guided biopsy is recommended.   3/21/2024 bilateral breast MRI RIGHT BREAST:  Within the right breast, in the subareolar region,  there is a heterogeneously enhancing irregular mass identified. This represents the patient's biopsy-proven malignancy. There is a biopsy clip centered within the mass. In addition mass seen on the diagnostic evaluation, there is additional surrounding, confluent non mass enhancement, low-level in intensity. Although the enhancement is low-level, it is asymmetric to the left breast. Considering, the lobular pathology this should be considered suspicious. This low-level non mass enhancement extends posteriorly from the mass and then anteriorly to involve the nipple areolar complex which is flattened and retracted. There is some enhancement of the skin of the nipple-areolar complex as well. The combined biopsy-proven mass and the surrounding non mass enhancement measure 5.2 cm. There are no other discrete enhancing masses or areas of non mass enhancement identified. There is no evidence of pectoralis or chest wall involvement. No axillary or internal mammary lymphadenopathy is appreciated. LEFT BREAST:  No suspicious mass or nonmass enhancement is identified. No axillary or internal mammary lymphadenopathy is appreciated. NON-BREAST FINDINGS:  None.     BREAST PROCEDURE: 3/1/24 right breast, subareolar, ultrasound-guided core biopsy.     REPRODUCTIVE HISTORY: menarche age 12ish, , first birth age 22, , no  OCP's, natural menopause age 44, history right oophorectomy, HRT less than a year     FAMILY CANCER HISTORY:   Paternal Cousin (1): Breast cancer, age 40 (BRCA positive)  Paternal Cousin (2): Breast cancer, late 50's (negative genetics)  Paternal Cousin (3): Breast cancer, 60's (negative genetics)  Father: Liver cancer, age 40  Mother: Bone cancer, age 70     MEDICAL HISTORY: hypothyroid, hyperlipidemia, hypertension     MEDICAL ONCOLOGY: referral placed, to schedule today     RADIATION ONCOLOGY: not indicated     GENETICS: meets NCCN criteria, Amb[place] 2024 negative for pathogenic  variant    Objective     Physical Exam    right Breast: TE in place but completely deflated, no erythema    right Axilla: healing SLN incision    Assessment/Plan   Stage I breast cancer.  Surgery is complete.  Ms. Scott Leung was provided with a copy of her pathology report and it was reviewed in detail with her.  Referrals to medical oncology has been placed and she will schedule as she leaves today. Does not meet post-mastectomy radiation criteria.  Oncotype DX has been ordered to determine the need for adjuvant chemotherapy.  Return to clinic in 9 months. Left diagnostic mammogram will be ordered and obtained before the visit. She understands that if the timing with reconstruction and if there is plan for a left balancing procedure around this time, that we can either move her imaging and visit with me up or back after the procedure.  Minal Aaron MD

## 2024-06-27 NOTE — TUMOR BOARD NOTE
MULTIDISCIPLINARY BREAST CANCER TUMOR BOARD CONFERENCE NOTE  Scott Leung was presented at Breast Cancer Tumor Board Conference  Conference date: 6/27/2024  Presenting Provider(s): Dr. Minal Aaron  Present at Conference: Medical Oncology, Radiation Oncology, Surgical Oncology, Radiology, and Pathology Representatives  Conference Review Type: Pathology Review    National Guidelines discussed: Yes    Surgical Resection: Yes, s/p right skin sparing mastectomy, SLN. Surgery is complete.  Radiation therapy: does not meet PMRT criteria  Genomic Testing: yes, OncotypeDx to determine need for chemotherapy  Systemic therapy: Endocrine therapy, consider bisphosphonates, use ODX to determine need for chemotherapy  Clinical Trial Eligible: no    Genetics: Completed, negative for pathogenic variant    Referral Recommendations:  Medical oncology    Cancer Staging:  Cancer Staging   Malignant neoplasm of central portion of right breast in female, estrogen receptor positive (Multi)  Staging form: Breast, AJCC 8th Edition  - Clinical stage from 3/1/2024: Stage IIA (cT2, cN0, cM0, G2, ER+, NJ-, HER2-) - Signed by Minal Aaron MD on 3/9/2024  - Pathologic stage from 6/10/2024: Stage IA (pT2, pN0(sn), cM0, G2, ER+, NJ+, HER2-) - Signed by Minal Aaron MD on 6/27/2024       Disclaimer  SCC tumor board recommendations represent the consensus opinion of physicians present at a weekly patient care conference. The treating SCC physician is not always present, and many of the physicians formulating the recommendation have not personally seen or examined the patient under discussion. It is understood that the treating SCC physician considers the expertise of the Tumor Board Recommendation in formulating his/her plan for the patient. However, in many situations, based on individualized patient considerations, a different plan is determined by the treating physician to be the optimal medical management.    Scribe Attestation  By signing my  name below, I, Brian Monroy   attest that this documentation has been prepared under the direction and in the presence of BREAST TUMOR BOARD.

## 2024-06-28 DIAGNOSIS — C50.111 MALIGNANT NEOPLASM OF CENTRAL PORTION OF RIGHT BREAST IN FEMALE, ESTROGEN RECEPTOR POSITIVE (MULTI): ICD-10-CM

## 2024-06-28 DIAGNOSIS — Z17.0 MALIGNANT NEOPLASM OF CENTRAL PORTION OF RIGHT BREAST IN FEMALE, ESTROGEN RECEPTOR POSITIVE (MULTI): ICD-10-CM

## 2024-06-28 LAB
CLINICAL SIG UPDATED INFO: NORMAL
LAB AP ASR DISCLAIMER: NORMAL
LAB AP BLOCK FOR ADDITIONAL STUDIES: NORMAL
LABORATORY COMMENT REPORT: NORMAL
PATH REPORT.ADDENDUM SPEC: NORMAL
PATH REPORT.FINAL DX SPEC: NORMAL
PATH REPORT.GROSS SPEC: NORMAL
PATH REPORT.RELEVANT HX SPEC: NORMAL
PATH REPORT.TOTAL CANCER: NORMAL
PATHOLOGY SYNOPTIC REPORT: NORMAL

## 2024-07-01 LAB
CLINICAL SIG UPDATED INFO: NORMAL
LAB AP ASR DISCLAIMER: NORMAL
LAB AP BLOCK FOR ADDITIONAL STUDIES: NORMAL
LABORATORY COMMENT REPORT: NORMAL
PATH REPORT.ADDENDUM SPEC: NORMAL
PATH REPORT.ADDENDUM SPEC: NORMAL
PATH REPORT.FINAL DX SPEC: NORMAL
PATH REPORT.GROSS SPEC: NORMAL
PATH REPORT.RELEVANT HX SPEC: NORMAL
PATH REPORT.TOTAL CANCER: NORMAL
PATHOLOGY SYNOPTIC REPORT: NORMAL

## 2024-07-01 PROCEDURE — 88368 INSITU HYBRIDIZATION MANUAL: CPT | Performed by: STUDENT IN AN ORGANIZED HEALTH CARE EDUCATION/TRAINING PROGRAM

## 2024-07-02 ENCOUNTER — OFFICE VISIT (OUTPATIENT)
Dept: SURGICAL ONCOLOGY | Facility: CLINIC | Age: 59
End: 2024-07-02
Payer: COMMERCIAL

## 2024-07-02 ENCOUNTER — APPOINTMENT (OUTPATIENT)
Dept: PLASTIC SURGERY | Facility: CLINIC | Age: 59
End: 2024-07-02
Payer: COMMERCIAL

## 2024-07-02 VITALS
SYSTOLIC BLOOD PRESSURE: 151 MMHG | HEART RATE: 59 BPM | BODY MASS INDEX: 35.75 KG/M2 | WEIGHT: 227.8 LBS | DIASTOLIC BLOOD PRESSURE: 85 MMHG | HEIGHT: 67 IN

## 2024-07-02 VITALS
HEART RATE: 59 BPM | BODY MASS INDEX: 35.56 KG/M2 | DIASTOLIC BLOOD PRESSURE: 85 MMHG | SYSTOLIC BLOOD PRESSURE: 151 MMHG | WEIGHT: 227.07 LBS

## 2024-07-02 DIAGNOSIS — Z17.0 MALIGNANT NEOPLASM OF CENTRAL PORTION OF RIGHT BREAST IN FEMALE, ESTROGEN RECEPTOR POSITIVE (MULTI): Primary | ICD-10-CM

## 2024-07-02 DIAGNOSIS — C50.111 MALIGNANT NEOPLASM OF CENTRAL PORTION OF RIGHT BREAST IN FEMALE, ESTROGEN RECEPTOR POSITIVE (MULTI): Primary | ICD-10-CM

## 2024-07-02 DIAGNOSIS — C50.111 MALIGNANT NEOPLASM OF CENTRAL PORTION OF RIGHT BREAST IN FEMALE, ESTROGEN RECEPTOR POSITIVE (MULTI): ICD-10-CM

## 2024-07-02 DIAGNOSIS — Z17.0 MALIGNANT NEOPLASM OF CENTRAL PORTION OF RIGHT BREAST IN FEMALE, ESTROGEN RECEPTOR POSITIVE (MULTI): ICD-10-CM

## 2024-07-02 PROCEDURE — 3079F DIAST BP 80-89 MM HG: CPT | Performed by: SURGERY

## 2024-07-02 PROCEDURE — 99024 POSTOP FOLLOW-UP VISIT: CPT | Performed by: SURGERY

## 2024-07-02 PROCEDURE — 3077F SYST BP >= 140 MM HG: CPT | Performed by: SURGERY

## 2024-07-02 RX ORDER — ONDANSETRON 8 MG/1
8 TABLET, ORALLY DISINTEGRATING ORAL EVERY 8 HOURS PRN
Qty: 20 TABLET | Refills: 0 | Status: SHIPPED | OUTPATIENT
Start: 2024-07-02 | End: 2024-07-09

## 2024-07-02 ASSESSMENT — PAIN SCALES - GENERAL: PAINLEVEL: 3

## 2024-07-08 ENCOUNTER — TELEPHONE (OUTPATIENT)
Dept: PLASTIC SURGERY | Facility: CLINIC | Age: 59
End: 2024-07-08
Payer: COMMERCIAL

## 2024-07-08 NOTE — TELEPHONE ENCOUNTER
Patient noticed fluid building up since ELIAS drains were removed. Denies fever/chills. She is getting her protein in daily. Added her on to our schedule tomorrow to assess.

## 2024-07-09 ENCOUNTER — OFFICE VISIT (OUTPATIENT)
Dept: PLASTIC SURGERY | Facility: CLINIC | Age: 59
End: 2024-07-09
Payer: COMMERCIAL

## 2024-07-09 VITALS
SYSTOLIC BLOOD PRESSURE: 150 MMHG | BODY MASS INDEX: 35.63 KG/M2 | HEIGHT: 67 IN | DIASTOLIC BLOOD PRESSURE: 88 MMHG | HEART RATE: 70 BPM | WEIGHT: 227 LBS

## 2024-07-09 DIAGNOSIS — C50.111 MALIGNANT NEOPLASM OF CENTRAL PORTION OF RIGHT BREAST IN FEMALE, ESTROGEN RECEPTOR POSITIVE (MULTI): Primary | ICD-10-CM

## 2024-07-09 DIAGNOSIS — Z17.0 MALIGNANT NEOPLASM OF CENTRAL PORTION OF RIGHT BREAST IN FEMALE, ESTROGEN RECEPTOR POSITIVE (MULTI): Primary | ICD-10-CM

## 2024-07-09 PROCEDURE — 99024 POSTOP FOLLOW-UP VISIT: CPT | Performed by: SURGERY

## 2024-07-09 PROCEDURE — 87186 SC STD MICRODIL/AGAR DIL: CPT

## 2024-07-09 PROCEDURE — 87205 SMEAR GRAM STAIN: CPT

## 2024-07-09 PROCEDURE — 3077F SYST BP >= 140 MM HG: CPT | Performed by: SURGERY

## 2024-07-09 PROCEDURE — 87075 CULTR BACTERIA EXCEPT BLOOD: CPT

## 2024-07-09 PROCEDURE — 3079F DIAST BP 80-89 MM HG: CPT | Performed by: SURGERY

## 2024-07-09 PROCEDURE — 87070 CULTURE OTHR SPECIMN AEROBIC: CPT

## 2024-07-09 RX ORDER — AMOXICILLIN AND CLAVULANATE POTASSIUM 875; 125 MG/1; MG/1
1 TABLET, FILM COATED ORAL 2 TIMES DAILY
Qty: 20 TABLET | Refills: 0 | Status: SHIPPED | OUTPATIENT
Start: 2024-07-09 | End: 2024-07-19

## 2024-07-09 RX ORDER — MUPIROCIN 20 MG/G
OINTMENT TOPICAL
Qty: 15 G | Refills: 0 | Status: SHIPPED | OUTPATIENT
Start: 2024-07-09 | End: 2024-07-19

## 2024-07-09 NOTE — PROGRESS NOTES
Plastic Surgery Clinic Visit    Date: 7/9/24  Date of Surgery: 6/10/24  Surgical Procedure: Right mastectomy with placement of tissue expander    History of Present Illness  Scott Leung 59 y.o. female is here for post-operative appointment for the above procedure(s).      Interval changes as of this date:   6/18: Pt here for her first post op visit. Doing well overall. Pain controlled. Endorses adequate protein intake. Doing her best to follow activity restrictions.   6/25 Doing well overall; no c/o pain.  ELIAS drains remain in place  7/2 Doing well overall. States she is maintaining activity restrictions; drain output 32 ml per day for 3 consecutive days  7/9 Doing well overall. Came in today to assess fluid collection in right breast since removing her drain last week.    Past Medical History:   Diagnosis Date    Abdominal adhesions 02/14/2024    Arthritis     Head injury 03/01/2023    Scoliosis 2021     Past Surgical History:   Procedure Laterality Date    INNER EAR SURGERY      KNEE ARTHROSCOPY W/ DEBRIDEMENT  10/09/2014    Knee Arthroscopy (Therapeutic)    OVARIAN CYST REMOVAL      TUBAL LIGATION      also had uterine ablation    ULNAR NERVE TRANSPOSITION      WISDOM TOOTH EXTRACTION  1985     Allergies   Allergen Reactions    Tetanus Vaccines And Toxoid Other    Latex Rash       Current Outpatient Medications:     amLODIPine (Norvasc) 10 mg tablet, TAKE 1 TABLET BY MOUTH EVERY DAY, Disp: 90 tablet, Rfl: 1    anastrozole (Arimidex) 1 mg tablet, TAKE 1 TABLET (1 MG TOTAL) BY MOUTH ONCE DAILY. SWALLOW WHOLE WITH A DRINK OF WATER., Disp: 30 tablet, Rfl: 0    ASPIRIN LOW-STRENGTH ORAL, Take 81 mg by mouth every other day., Disp: , Rfl:     cholecalciferol (Vitamin D-3) 25 MCG (1000 UT) capsule, Take 1 capsule (25 mcg) by mouth once daily., Disp: , Rfl:     escitalopram (Lexapro) 10 mg tablet, Take 0.5 tablets (5 mg) by mouth once daily., Disp: 45 tablet, Rfl: 3    hydrocortisone 2.5 % cream, Hydrocortisone 2.5 %  External Cream Quantity: 56  Refills: 0  Start: 9-Aug-2021, Disp: , Rfl:     levothyroxine (Synthroid, Levoxyl) 25 mcg tablet, TAKE 1 TABLET (25 MCG) BY MOUTH ONCE DAILY IN THE MORNING., Disp: 90 tablet, Rfl: 1    losartan (Cozaar) 100 mg tablet, TAKE 1 TABLET BY MOUTH EVERY DAY, Disp: 90 tablet, Rfl: 0    ondansetron ODT (Zofran-ODT) 8 mg disintegrating tablet, Take 1 tablet (8 mg) by mouth every 8 hours if needed for nausea or vomiting for up to 7 days. (Patient not taking: Reported on 7/9/2024), Disp: 20 tablet, Rfl: 0    propranolol (Inderal) 10 mg tablet, TAKE 1 TABLET BY MOUTH TWICE A DAY, Disp: 180 tablet, Rfl: 1    rizatriptan MLT (Maxalt-MLT) 10 mg disintegrating tablet, Take 1 tablet (10 mg) by mouth 1 time if needed for migraine. May repeat once in 2 hours if needed (Maximum 2 tablets in 24 hours), Disp: 9 tablet, Rfl: 1    rosuvastatin (Crestor) 20 mg tablet, TAKE 1 TABLET BY MOUTH EVERY DAY, Disp: 90 tablet, Rfl: 1   Family History   Problem Relation Name Age of Onset    Atrial fibrillation Mother Megan     Other (htn) Mother Megan     Multiple myeloma Mother Megan     Peripheral vascular disease Mother Megan     Other (thyroid disorder) Mother Megan     Arthritis Mother Megan     Liver cancer Father      Atrial fibrillation Sister Tammy     Other (crest) Sister Tammy     Other (pulmonary htn) Sister Tammy     Other (pulmonary occlusive disease) Sister Tammy     Colonic polyp Sister Tammy     Peripheral vascular disease Sister Tammy     Other (thyroid disorder) Sister Tammy     Colon cancer Sister Tammy         detected abnormal cells and had surgery and was told family members should be screened every 5 years    Atrial fibrillation Brother Jad     Diabetes Brother Jad     Other (cabg) Brother Jad     Peripheral vascular disease Brother Jad     Atrial fibrillation Brother Kenneth     Heart disease Brother Kenneth     Atrial fibrillation Brother Sudheer     Diabetes Brother Sudheer     Heart disease Brother Sudheer      "Stroke Brother Sudheer     Other (CREST) Other       Social History     Tobacco Use    Smoking status: Never    Smokeless tobacco: Never   Substance Use Topics    Alcohol use: Yes     Alcohol/week: 8.0 standard drinks of alcohol     Types: 2 Glasses of wine, 6 Cans of beer per week     Comment: Varies, social    Drug use: Never     Review of Systems   Constitutional: negative for fevers, chills, unintentional weight loss  HEENT: negative for changes in vision, headaches, changes in hearing, congestion, sore throat  Cardiovascular: negative for chest pain, palpitations  Respiratory: negative for cough, shortness of breath  Gastrointestinal: negative for nausea, vomiting, diarrhea  Genitourinary: negative for dysuria, hematuria  Musculoskeletal: negative for joint swelling or pain  Skin: negative for rashes or lesions  Psych: negative for depression, anxiety  Endocrine: negative for polyuria, polydipsia, cold/heat intolerance  Hem/Lymph: negative for bleeding disorder     Objective    /88   Pulse 70   Ht 1.702 m (5' 7\")   Wt 103 kg (227 lb)   BMI 35.55 kg/m²      Physical Exam   General: alert and oriented, no apparent distress     Focused exam of the breasts:  Incision healed.  There is 3 x 3.5 cm of erythema present on the lateral aspect of the incision. No warmth.  + fluid wave.      Diagnostics   No results found for this or any previous visit (from the past 24 hour(s)).  NM injection only for sentinel node biopsy  no scan performed    Result Date: 6/10/2024  Interpreted By:  Brandt Chau, STUDY: NM INJECTION ONLY FOR SENTINEL NODE BIOPSY  NO SCAN PERFORMED; 6/10/2024 8:10 am   INDICATION: Signs/Symptoms:intraoperative lymphatic mapping for right axillary SLNB.   COMPARISON: None.   ACCESSION NUMBER(S): [id number]   ORDERING CLINICIAN: RICARDO GÓMEZ   TECHNIQUE: DIVISION OF NUCLEAR MEDICINE BREAST SENTINEL NODE LOCALIZATION   Syringes containing a total of  1 millicuries of Tc-99m tilmanocept (Lymphoseek) " were delivered to the Breast Center for injection and sentinel lymph node localization to be performed by the patient's attending breast surgeon at lymph node dissection/biopsy.   FINDINGS: No images were acquired.       Breast carcinoma undergoing peritumoral Tc-99m tilmanocept (Lymphoseek) injection for intraoperative sentinel lymph node localization.   I personally reviewed the images/study and I agree with the findings as stated.  This study was interpreted at [facility], [place], [place].   MACRO: None   Signed by: Brandt Chau 6/10/2024 8:37 AM Dictation workstation:   [id number]    Tissue Expander: 475 cc HP  7/9 Aspirated 180cc around TE.  Filled 180 ml into TE.  Total 180 cc    Assessment/Plan  Scott Leung is a 59 y.o. female who had right mastectomy with placement of tissue expander on 6/10/24 who presents for POV.     Start Augmentin BID for 10 days due to erythema   The patient was aspirated 180cc's and sent for culture  Steri-Strips were put in place and she was told to apply mupirocin daily to drain sites. Cover with bandaid.  Given foam and gentle ACE wrap      RTC in 1 week    Scribe Attestation  By signing my name below, ISaman, Scribe   attest that this documentation has been prepared under the direction and in the presence of Danica Torres MD.     Attending Attestation:  IDanica MD, personal performed the history, exam, and decision making on this patient.

## 2024-07-14 LAB
BACTERIA FLD CULT: ABNORMAL
GRAM STN SPEC: ABNORMAL
GRAM STN SPEC: ABNORMAL

## 2024-07-15 DIAGNOSIS — N64.89 SEROMA OF BREAST: Primary | ICD-10-CM

## 2024-07-15 RX ORDER — LEVOFLOXACIN 750 MG/1
750 TABLET ORAL DAILY
Qty: 10 TABLET | Refills: 0 | Status: SHIPPED | OUTPATIENT
Start: 2024-07-15 | End: 2024-07-25

## 2024-07-15 NOTE — PROGRESS NOTES
Plastic Surgery Clinic Visit    Date: 7/16/24  Date of Surgery: 6/10/24  Surgical Procedure: Right mastectomy with placement of tissue expander    History of Present Illness  Scott Leung 59 y.o. female is here for post-operative appointment for the above procedure(s).      Interval changes as of this date:   6/18: Pt here for her first post op visit. Doing well overall. Pain controlled. Endorses adequate protein intake. Doing her best to follow activity restrictions.   6/25 Doing well overall; no c/o pain.  ELIAS drains remain in place  7/2 Doing well overall. States she is maintaining activity restrictions; drain output 32 ml per day for 3 consecutive days  7/9 Doing well overall. Came in today to assess fluid collection in right breast since removing her drain last week.  7/16 Doing well overall. Here today for assessment of seroma in right breast. She was switched from augmentin to levaquin yesterday.     Past Medical History:   Diagnosis Date    Abdominal adhesions 02/14/2024    Arthritis     Head injury 03/01/2023    Scoliosis 2021     Past Surgical History:   Procedure Laterality Date    INNER EAR SURGERY      KNEE ARTHROSCOPY W/ DEBRIDEMENT  10/09/2014    Knee Arthroscopy (Therapeutic)    OVARIAN CYST REMOVAL      TUBAL LIGATION      also had uterine ablation    ULNAR NERVE TRANSPOSITION      WISDOM TOOTH EXTRACTION  1985     Allergies   Allergen Reactions    Tetanus Vaccines And Toxoid Other    Latex Rash       Current Outpatient Medications:     amLODIPine (Norvasc) 10 mg tablet, TAKE 1 TABLET BY MOUTH EVERY DAY, Disp: 90 tablet, Rfl: 1    amoxicillin-pot clavulanate (Augmentin) 875-125 mg tablet, Take 1 tablet by mouth 2 times a day for 10 days., Disp: 20 tablet, Rfl: 0    anastrozole (Arimidex) 1 mg tablet, TAKE 1 TABLET (1 MG TOTAL) BY MOUTH ONCE DAILY. SWALLOW WHOLE WITH A DRINK OF WATER., Disp: 30 tablet, Rfl: 0    ASPIRIN LOW-STRENGTH ORAL, Take 81 mg by mouth every other day., Disp: , Rfl:      cholecalciferol (Vitamin D-3) 25 MCG (1000 UT) capsule, Take 1 capsule (25 mcg) by mouth once daily., Disp: , Rfl:     escitalopram (Lexapro) 10 mg tablet, Take 0.5 tablets (5 mg) by mouth once daily., Disp: 45 tablet, Rfl: 3    hydrocortisone 2.5 % cream, Hydrocortisone 2.5 % External Cream Quantity: 56  Refills: 0  Start: 9-Aug-2021, Disp: , Rfl:     levoFLOXacin (Levaquin) 750 mg tablet, Take 1 tablet (750 mg) by mouth once daily for 10 days., Disp: 10 tablet, Rfl: 0    levothyroxine (Synthroid, Levoxyl) 25 mcg tablet, TAKE 1 TABLET (25 MCG) BY MOUTH ONCE DAILY IN THE MORNING., Disp: 90 tablet, Rfl: 1    losartan (Cozaar) 100 mg tablet, TAKE 1 TABLET BY MOUTH EVERY DAY, Disp: 90 tablet, Rfl: 0    mupirocin (Bactroban) 2 % ointment, Apply topically 3 times a day for 10 days., Disp: 15 g, Rfl: 0    propranolol (Inderal) 10 mg tablet, TAKE 1 TABLET BY MOUTH TWICE A DAY, Disp: 180 tablet, Rfl: 1    rizatriptan MLT (Maxalt-MLT) 10 mg disintegrating tablet, Take 1 tablet (10 mg) by mouth 1 time if needed for migraine. May repeat once in 2 hours if needed (Maximum 2 tablets in 24 hours), Disp: 9 tablet, Rfl: 1    rosuvastatin (Crestor) 20 mg tablet, TAKE 1 TABLET BY MOUTH EVERY DAY, Disp: 90 tablet, Rfl: 1   Family History   Problem Relation Name Age of Onset    Atrial fibrillation Mother Megan     Other (htn) Mother Megan     Multiple myeloma Mother Megan     Peripheral vascular disease Mother Megan     Other (thyroid disorder) Mother Megan     Arthritis Mother Megan     Liver cancer Father      Atrial fibrillation Sister Tammy     Other (crest) Sister Tammy     Other (pulmonary htn) Sister Tammy     Other (pulmonary occlusive disease) Sister Tammy     Colonic polyp Sister Tammy     Peripheral vascular disease Sister Tammy     Other (thyroid disorder) Sister Tammy     Colon cancer Sister Tammy         detected abnormal cells and had surgery and was told family members should be screened every 5 years    Atrial fibrillation Brother  Jad     Diabetes Brother Jad     Other (cabg) Brother Jad     Peripheral vascular disease Brother Jad     Atrial fibrillation Brother Kenneth     Heart disease Brother Kenneth     Atrial fibrillation Brother Sudheer     Diabetes Brother Sudheer     Heart disease Brother Sudheer     Stroke Brother Sudheer     Other (CREST) Other       Social History     Tobacco Use    Smoking status: Never    Smokeless tobacco: Never   Substance Use Topics    Alcohol use: Yes     Alcohol/week: 8.0 standard drinks of alcohol     Types: 2 Glasses of wine, 6 Cans of beer per week     Comment: Varies, social    Drug use: Never     Review of Systems   Constitutional: negative for fevers, chills, unintentional weight loss  HEENT: negative for changes in vision, headaches, changes in hearing, congestion, sore throat  Cardiovascular: negative for chest pain, palpitations  Respiratory: negative for cough, shortness of breath  Gastrointestinal: negative for nausea, vomiting, diarrhea  Genitourinary: negative for dysuria, hematuria  Musculoskeletal: negative for joint swelling or pain  Skin: negative for rashes or lesions  Psych: negative for depression, anxiety  Endocrine: negative for polyuria, polydipsia, cold/heat intolerance  Hem/Lymph: negative for bleeding disorder     Objective  Blood pressure 153/84, pulse 67, weight 103 kg (226 lb).      Physical Exam   General: alert and oriented, no apparent distress     Focused exam of the breasts:  Incision healed.  There is 3 x 3.5 cm of erythema present on the lateral aspect of the incision. No warmth.  + fluid wave.      Diagnostics   NM injection only for sentinel node biopsy  no scan performed    Result Date: 6/10/2024  Interpreted By:  Brandt Chau, STUDY: NM INJECTION ONLY FOR SENTINEL NODE BIOPSY  NO SCAN PERFORMED; 6/10/2024 8:10 am   INDICATION: Signs/Symptoms:intraoperative lymphatic mapping for right axillary SLNB.   COMPARISON: None.   ACCESSION NUMBER(S): [id number]   ORDERING  CLINICIAN: RICARDO GÓMEZ   TECHNIQUE: DIVISION OF NUCLEAR MEDICINE BREAST SENTINEL NODE LOCALIZATION   Syringes containing a total of  1 millicuries of Tc-99m tilmanocept (Lymphoseek) were delivered to the [facility] Center for injection and sentinel lymph node localization to be performed by the patient's attending breast surgeon at lymph node dissection/biopsy.   FINDINGS: No images were acquired.       Breast carcinoma undergoing peritumoral Tc-99m tilmanocept (Lymphoseek) injection for intraoperative sentinel lymph node localization.   I personally reviewed the images/study and I agree with the findings as stated.  This study was interpreted at [facility], [place], [place].   MACRO: None   Signed by: Brandt Chau 6/10/2024 8:37 AM Dictation workstation:   [id number]    Tissue Expander: 475 cc HP  7/9 Aspirated 180cc around TE.  Filled 180 ml into TE.  Total 180 cc  7/16 Aspirated 160cc of periexpander fluid. Filled TE with 175 ml (total 355 ml) *repeat cultures sent    Assessment/Plan  Scott Leung is a 59 y.o. female who had right mastectomy with placement of tissue expander on 6/10/24 who presents for POV.     Repeat cultures sent of right periexpander fluid.  Continue with Levaquin course   Continue compression with bra or ace wrap  Wear right arm sling all the time except for sleep     RTC Thursday for seroma check    Attending Attestation:  I, Danica Torres MD, personal performed the history, exam, and decision making on this patient.

## 2024-07-16 ENCOUNTER — OFFICE VISIT (OUTPATIENT)
Dept: PLASTIC SURGERY | Facility: CLINIC | Age: 59
End: 2024-07-16
Payer: COMMERCIAL

## 2024-07-16 ENCOUNTER — OFFICE VISIT (OUTPATIENT)
Dept: HEMATOLOGY/ONCOLOGY | Facility: CLINIC | Age: 59
End: 2024-07-16
Payer: COMMERCIAL

## 2024-07-16 ENCOUNTER — APPOINTMENT (OUTPATIENT)
Dept: PLASTIC SURGERY | Facility: CLINIC | Age: 59
End: 2024-07-16
Payer: COMMERCIAL

## 2024-07-16 VITALS
SYSTOLIC BLOOD PRESSURE: 153 MMHG | BODY MASS INDEX: 36.48 KG/M2 | HEART RATE: 67 BPM | DIASTOLIC BLOOD PRESSURE: 84 MMHG | RESPIRATION RATE: 16 BRPM | HEIGHT: 66 IN | WEIGHT: 226.96 LBS | OXYGEN SATURATION: 95 % | TEMPERATURE: 97.8 F

## 2024-07-16 VITALS
DIASTOLIC BLOOD PRESSURE: 84 MMHG | HEART RATE: 67 BPM | SYSTOLIC BLOOD PRESSURE: 153 MMHG | WEIGHT: 226 LBS | BODY MASS INDEX: 36.85 KG/M2

## 2024-07-16 DIAGNOSIS — C50.111 MALIGNANT NEOPLASM OF CENTRAL PORTION OF RIGHT BREAST IN FEMALE, ESTROGEN RECEPTOR POSITIVE (MULTI): ICD-10-CM

## 2024-07-16 DIAGNOSIS — C50.111 MALIGNANT NEOPLASM OF CENTRAL PORTION OF RIGHT BREAST IN FEMALE, ESTROGEN RECEPTOR POSITIVE (MULTI): Primary | ICD-10-CM

## 2024-07-16 DIAGNOSIS — Z17.0 MALIGNANT NEOPLASM OF CENTRAL PORTION OF RIGHT BREAST IN FEMALE, ESTROGEN RECEPTOR POSITIVE (MULTI): Primary | ICD-10-CM

## 2024-07-16 DIAGNOSIS — N64.89 SEROMA OF BREAST: ICD-10-CM

## 2024-07-16 DIAGNOSIS — Z17.0 MALIGNANT NEOPLASM OF CENTRAL PORTION OF RIGHT BREAST IN FEMALE, ESTROGEN RECEPTOR POSITIVE (MULTI): ICD-10-CM

## 2024-07-16 PROCEDURE — 87205 SMEAR GRAM STAIN: CPT | Mod: AHULAB | Performed by: SURGERY

## 2024-07-16 PROCEDURE — 99024 POSTOP FOLLOW-UP VISIT: CPT | Performed by: SURGERY

## 2024-07-16 PROCEDURE — 3077F SYST BP >= 140 MM HG: CPT | Performed by: STUDENT IN AN ORGANIZED HEALTH CARE EDUCATION/TRAINING PROGRAM

## 2024-07-16 PROCEDURE — 3079F DIAST BP 80-89 MM HG: CPT | Performed by: STUDENT IN AN ORGANIZED HEALTH CARE EDUCATION/TRAINING PROGRAM

## 2024-07-16 PROCEDURE — 3077F SYST BP >= 140 MM HG: CPT | Performed by: SURGERY

## 2024-07-16 PROCEDURE — 99215 OFFICE O/P EST HI 40 MIN: CPT | Performed by: STUDENT IN AN ORGANIZED HEALTH CARE EDUCATION/TRAINING PROGRAM

## 2024-07-16 PROCEDURE — 3079F DIAST BP 80-89 MM HG: CPT | Performed by: SURGERY

## 2024-07-16 PROCEDURE — 99205 OFFICE O/P NEW HI 60 MIN: CPT | Performed by: STUDENT IN AN ORGANIZED HEALTH CARE EDUCATION/TRAINING PROGRAM

## 2024-07-16 RX ORDER — EXEMESTANE 25 MG/1
25 TABLET ORAL DAILY
Qty: 90 TABLET | Refills: 1 | Status: SHIPPED | OUTPATIENT
Start: 2024-07-16 | End: 2025-07-16

## 2024-07-16 ASSESSMENT — PAIN SCALES - GENERAL
PAINLEVEL: 3
PAINLEVEL: 3

## 2024-07-16 ASSESSMENT — PATIENT HEALTH QUESTIONNAIRE - PHQ9
SUM OF ALL RESPONSES TO PHQ9 QUESTIONS 1 & 2: 0
1. LITTLE INTEREST OR PLEASURE IN DOING THINGS: NOT AT ALL
2. FEELING DOWN, DEPRESSED OR HOPELESS: NOT AT ALL

## 2024-07-16 NOTE — PROGRESS NOTES
Breast Medical Oncology Clinic  Location: [facility]    Visit Type: New Patient Visit    Oncologic History:    6/8/2023: Screening mammogram: No mammographic evidence of malignancy.    Patient palpated right breast mass.    2/19/2024: Diagnostic breast imaging: On mammogram there is a masslike focal asymmetry with spiculations in the central breast at mid depth.  On ultrasound there is a mass seen in the subareolar region at 11 o'clock position measuring 2.9 x 2.3 x 1.9 cm.  4 morphologically normal right axillary lymph nodes noted.    3/1/2024: Right breast ultrasound-guided core needle biopsy: Invasive lobular carcinoma grade 2, ER positive (85%) RI negative and HER2 negative.  There is atypical lobular hyperplasia present.    3/22/2024: MRI of the breast: Within the right breast there is an irregular mass with biopsy clip centered within the mass.  There is additional surrounding confluent non-mass enhancement, low level and intensity.  This is asymmetric to the left breast.  This extends posteriorly from the mass then anteriorly to involve the nipple areolar complex.  This entire area spans 5.2 x 3.6 x 2.8 cm.  There is no evidence of pectoralis or chest wall involvement.  No axillary or internal mammary lymphadenopathy present.  No findings in the left breast.    Patient elected to defer surgery for several months to be able to attend her son's wedding.  She was placed on anastrozole for about 2 to 3 months.  She reports she did not tolerate it well and developed daily migraines.  She has a history of migraines.  Migraines resolved with discontinuation of anastrozole preoperatively.    6/10/2024: Right breast mastectomy and sentinel lymph node biopsy and tissue expander placement: Final pathology yields multiple foci and invasive carcinoma, largest invasive lobular carcinoma foci measuring 4.2 cm.  There is LCIS present.  All margins are negative.  4 regional lymph nodes examined and all negative for tumor.  PT2  "pN0    Oncotype DX recurrence score 18    Subjective: History of Present Illness    Patient presents today for NPV for management of recently diagnosed breast cancer. She is accompanied by daughter.     Oncologic history reviewed above.    Patient self palpated breast mass.    She was in her usual state of health at the time of diagnosis.     Postop period has been complicated by fluid collection in the right breast.  She has been seeing her plastic surgeon on a regular basis for drainage.    She was on anastrozole preoperatively as she planned to delay her surgery to be able to attend her son's wedding.  Reports she did not tolerate anastrozole and developed daily migraines.  These daily migraines subsided after discontinuation of anastrozole prior to her breast surgery.    Patient has a history of migraines about 1 to 2/week.  Otherwise no health concerns at this time.  She works as a .      Gynecologic History:     Age of first menses: 12 years old  Age of last menses: 41 years old, post-menopausal  ; FLB at age 22  She did not breastfeed  Uterus/Ovaries: Intact  OCP: <5 years  HRT: Yes    Pertinent Family history:    Breast Cancer: Paternal cousins X5   Ovarian Cancer: None  Pancreatic Cancer: None  Other:  father- liver cancer; mother- bone cancer    Social History  Scott Leung  reports that she has never smoked. She has never used smokeless tobacco.  She  reports current alcohol use of about 8.0 standard drinks of alcohol per week.  She  reports no history of drug use.    ROS:     Review of Systems   All other systems reviewed and are negative.       Physical Examination:    /84 (BP Location: Left arm, Patient Position: Sitting, BP Cuff Size: Adult)   Pulse 67   Temp 36.6 °C (97.8 °F) (Temporal)   Resp 16   Ht (S) 1.668 m (5' 5.67\")   Wt 103 kg (226 lb 15.4 oz)   SpO2 95%   BMI 37.00 kg/m²     Physical Exam  Vitals and nursing note reviewed.   Constitutional:       General: She " is not in acute distress.     Appearance: Normal appearance. She is not toxic-appearing.   HENT:      Head: Normocephalic and atraumatic.   Eyes:      Conjunctiva/sclera: Conjunctivae normal.   Cardiovascular:      Rate and Rhythm: Normal rate.   Pulmonary:      Effort: Pulmonary effort is normal. No respiratory distress.   Abdominal:      General: Abdomen is flat.      Palpations: Abdomen is soft.   Musculoskeletal:         General: No swelling. Normal range of motion.      Cervical back: Normal range of motion.   Skin:     General: Skin is warm and dry.   Neurological:      Mental Status: She is alert.   Psychiatric:         Mood and Affect: Mood normal.       Breast Examination:    Patient elected to decline breast exam at today's visit as she was seen by her plastic surgeon.    ECOG Performance Status:     [] 0 Fully active, able to carry on all pre-disease performance without restriction  [] 1 Restricted in physically strenuous activity but ambulatory and able to carry out work of a light or sedentary nature, e.g., light house work, office work  [] 2 Ambulatory and capable of all selfcare but unable to carry out any work activities; up and about more than 50% of waking hours  [] 3 Capable of only limited selfcare; confined to bed or chair more than 50% of waking hours  [] 4 Completely disabled; cannot carry on any selfcare; totally confined to bed or chair  [] 5 Dead     Results:    Labs:  Reviewed above in Onc History    Lab Results   Component Value Date    WBC 6.4 05/28/2024    HGB 13.3 05/28/2024    HCT 39.8 05/28/2024    MCV 93 05/28/2024     05/28/2024       Chemistry    Lab Results   Component Value Date/Time     05/28/2024 1210    K 4.3 05/28/2024 1210     05/28/2024 1210    CO2 27 05/28/2024 1210    BUN 13 05/28/2024 1210    CREATININE 0.84 05/28/2024 1210    Lab Results   Component Value Date/Time    CALCIUM 9.4 05/28/2024 1210    ALKPHOS 80 05/28/2024 1210    AST 25 05/28/2024 1210     ALT 30 05/28/2024 1210    BILITOT 0.6 05/28/2024 1210             Imaging:  Reviewed above in Onc History    Pathology:  Reviewed above in Onc History    Assessment:     Pathologic prognostic stage IA (pT2 pN0 cM0) infiltrating lobular carcinoma of the right breast; Dx in 3/2024; Grade 2; ER positive (85%), NM negative, HER2 negative; Oncotype DX 18 ; S/p right mastectomy and sentinel lymph node biopsy    Scott Leung is a very pleasant 59 y.o. postmenopausal female with newly diagnosed breast cancer with history of HTN, hypothyroidism. We reviewed the events that led to her diagnosis and subsequent procedures that have taken place. We discussed the features of her cancer that determine the approach to treatment including the size, grade, presence/absence of lymphovascular invasion, lymph node status and hormone receptor status (including Her2-marbella). Given these findings, the following treatment plan has been recommended:      Breast Cancer Treatment Plan:    Surgical Plan: S/p R mastectomy with SLNBx  Additional biopsy: No further biopsy indicated  Radiation Plan: Not indicated, discussed at TB  Additional staging scans/DEXA/echo: Staging scans not indicated based on current stage, patient history and physical examination. Will order baseline DEXA scan in anticipation of long term endocrine therapy.   Additional Path info (i.e Ki67, PDL1): Not indicated  Gene assays: Oncotype DX 18    Systemic treatment plan: She has a very early stage HR positive cancer with generally low clinical risk features and low genomic risk. For this reason I recommended no chemotherapy as part of her treatment plan. We discussed the recommendation for hormone blocking therapy in detail including the small benefit in decreasing her risk of cancer recurrence. We reviewed the potential risks and toxicities of aromatase inhibition. These include, but are not limited to, a decline in bone mineral density with the attendant risk of fracture,  elevated cholesterol panel, head-ache, hot flashes, skin rash, myalgias, and arthralgias. Ultimately, she decided to pursue treatment with endocrine therapy.  Given she already did a trial of Arimidex prior to her surgery we will give a trial of exemestane. We will follow her closely and assess tolerance. DEXA scan will be ordered in anticipation of treatment.  Exemestane was prescribed and sent to her local pharmacy.    Intent: Curative   Clinical trial: Not eligible for our current trials   Endocrine therapy: Exemestane; did not tolerate anastrozole developed daily migraines   HER2 treatment: not indicated   Targeted agents: not indicated   Chemotherapy: Not indicated   BMA: Will discuss at follow-up visit    Access: Not indicated  Supportive meds: No new supportive medications prescribed  Genetic testing: Completed; negative  Fertility preservation: Not indicated    Other active problems/orders:     N/A    Surveillance plan: Continue yearly mammogram of the left breast    Follow-up: 3 months    Scott Leung expressed understanding of the plan outlined above. Scott Leung had ample time to ask questions. Scott Leung understands she can contact us at any time should she have additional questions or issues arise in the interim.    Clarence Shore MD  Breast Medical Oncology  [facility]    Portions of this note were dictated utilizing voice recognition software.

## 2024-07-16 NOTE — PATIENT INSTRUCTIONS
Please schedule a follow up visit with us in 3 months.   Please call us at [phone] option 5 then option 2 with any questions or concerns

## 2024-07-18 ENCOUNTER — OFFICE VISIT (OUTPATIENT)
Dept: PLASTIC SURGERY | Facility: CLINIC | Age: 59
End: 2024-07-18
Payer: COMMERCIAL

## 2024-07-18 VITALS
HEART RATE: 69 BPM | SYSTOLIC BLOOD PRESSURE: 132 MMHG | DIASTOLIC BLOOD PRESSURE: 78 MMHG | BODY MASS INDEX: 37.02 KG/M2 | WEIGHT: 227.07 LBS | TEMPERATURE: 97.3 F

## 2024-07-18 DIAGNOSIS — N64.89 SEROMA OF BREAST: Primary | ICD-10-CM

## 2024-07-18 PROCEDURE — 3078F DIAST BP <80 MM HG: CPT | Performed by: SURGERY

## 2024-07-18 PROCEDURE — 99024 POSTOP FOLLOW-UP VISIT: CPT | Performed by: SURGERY

## 2024-07-18 PROCEDURE — 3075F SYST BP GE 130 - 139MM HG: CPT | Performed by: SURGERY

## 2024-07-18 PROCEDURE — 1036F TOBACCO NON-USER: CPT | Performed by: SURGERY

## 2024-07-18 ASSESSMENT — PAIN SCALES - GENERAL: PAINLEVEL: 2

## 2024-07-18 NOTE — PROGRESS NOTES
Plastic Surgery Clinic Visit    Date: 7/18/24  Date of Surgery: 6/10/24  Surgical Procedure: Right mastectomy with placement of tissue expander    History of Present Illness  Scott Leung 59 y.o. female is here for post-operative appointment for the above procedure(s).      Interval changes as of this date:   6/18: Pt here for her first post op visit. Doing well overall. Pain controlled. Endorses adequate protein intake. Doing her best to follow activity restrictions.   6/25 Doing well overall; no c/o pain.  ELIAS drains remain in place  7/2 Doing well overall. States she is maintaining activity restrictions; drain output 32 ml per day for 3 consecutive days  7/9 Doing well overall. Came in today to assess fluid collection in right breast since removing her drain last week.  7/16 Doing well overall. Here today for assessment of seroma in right breast. She was switched from augmentin to levaquin yesterday.   7/18 Presented to clinic for seroma assessment. 48cc periexpander fluid was aspirated from right breast on exam today.    Past Medical History:   Diagnosis Date    Abdominal adhesions 02/14/2024    Arthritis     Head injury 03/01/2023    Scoliosis 2021     Past Surgical History:   Procedure Laterality Date    INNER EAR SURGERY      KNEE ARTHROSCOPY W/ DEBRIDEMENT  10/09/2014    Knee Arthroscopy (Therapeutic)    OVARIAN CYST REMOVAL      TUBAL LIGATION      also had uterine ablation    ULNAR NERVE TRANSPOSITION      WISDOM TOOTH EXTRACTION  1985     Allergies   Allergen Reactions    Tetanus Vaccines And Toxoid Other    Latex Rash       Current Outpatient Medications:     amLODIPine (Norvasc) 10 mg tablet, TAKE 1 TABLET BY MOUTH EVERY DAY, Disp: 90 tablet, Rfl: 1    amoxicillin-pot clavulanate (Augmentin) 875-125 mg tablet, Take 1 tablet by mouth 2 times a day for 10 days. (Patient not taking: Reported on 7/16/2024), Disp: 20 tablet, Rfl: 0    anastrozole (Arimidex) 1 mg tablet, TAKE 1 TABLET (1 MG TOTAL) BY MOUTH  ONCE DAILY. SWALLOW WHOLE WITH A DRINK OF WATER., Disp: 30 tablet, Rfl: 0    ASPIRIN LOW-STRENGTH ORAL, Take 81 mg by mouth every other day., Disp: , Rfl:     cholecalciferol (Vitamin D-3) 25 MCG (1000 UT) capsule, Take 1 capsule (25 mcg) by mouth once daily., Disp: , Rfl:     escitalopram (Lexapro) 10 mg tablet, Take 0.5 tablets (5 mg) by mouth once daily., Disp: 45 tablet, Rfl: 3    exemestane (Aromasin) 25 mg tablet, Take 1 tablet (25 mg total) by mouth once daily.  Take after a meal.  Try to take at the same time each day., Disp: 90 tablet, Rfl: 1    hydrocortisone 2.5 % cream, Hydrocortisone 2.5 % External Cream Quantity: 56  Refills: 0  Start: 9-Aug-2021, Disp: , Rfl:     levoFLOXacin (Levaquin) 750 mg tablet, Take 1 tablet (750 mg) by mouth once daily for 10 days., Disp: 10 tablet, Rfl: 0    levothyroxine (Synthroid, Levoxyl) 25 mcg tablet, TAKE 1 TABLET (25 MCG) BY MOUTH ONCE DAILY IN THE MORNING., Disp: 90 tablet, Rfl: 1    losartan (Cozaar) 100 mg tablet, TAKE 1 TABLET BY MOUTH EVERY DAY, Disp: 90 tablet, Rfl: 0    mupirocin (Bactroban) 2 % ointment, Apply topically 3 times a day for 10 days., Disp: 15 g, Rfl: 0    propranolol (Inderal) 10 mg tablet, TAKE 1 TABLET BY MOUTH TWICE A DAY, Disp: 180 tablet, Rfl: 1    rizatriptan MLT (Maxalt-MLT) 10 mg disintegrating tablet, Take 1 tablet (10 mg) by mouth 1 time if needed for migraine. May repeat once in 2 hours if needed (Maximum 2 tablets in 24 hours), Disp: 9 tablet, Rfl: 1    rosuvastatin (Crestor) 20 mg tablet, TAKE 1 TABLET BY MOUTH EVERY DAY, Disp: 90 tablet, Rfl: 1   Family History   Problem Relation Name Age of Onset    Atrial fibrillation Mother Megan     Other (htn) Mother Megan     Multiple myeloma Mother Megan     Peripheral vascular disease Mother Megan     Other (thyroid disorder) Mother Megan     Arthritis Mother Megan     Liver cancer Father      Atrial fibrillation Sister Tammy     Other (crest) Sister Tammy     Other (pulmonary htn) Sister Tammy      Other (pulmonary occlusive disease) Sister Tammy     Colonic polyp Sister Tammy     Peripheral vascular disease Sister Tammy     Other (thyroid disorder) Sister Tammy     Colon cancer Sister Tammy         detected abnormal cells and had surgery and was told family members should be screened every 5 years    Atrial fibrillation Brother Jad     Diabetes Brother Jad     Other (cabg) Brother Jad     Peripheral vascular disease Brother Jad     Atrial fibrillation Brother Kenneth     Heart disease Brother Kenneth     Atrial fibrillation Brother Sudheer     Diabetes Brother Sudheer     Heart disease Brother Sudheer     Stroke Brother Sudheer     Other (CREST) Other       Social History     Tobacco Use    Smoking status: Never    Smokeless tobacco: Never   Substance Use Topics    Alcohol use: Yes     Alcohol/week: 8.0 standard drinks of alcohol     Types: 2 Glasses of wine, 6 Cans of beer per week     Comment: Varies, social    Drug use: Never     Review of Systems   Constitutional: negative for fevers, chills, unintentional weight loss  HEENT: negative for changes in vision, headaches, changes in hearing, congestion, sore throat  Cardiovascular: negative for chest pain, palpitations  Respiratory: negative for cough, shortness of breath  Gastrointestinal: negative for nausea, vomiting, diarrhea  Genitourinary: negative for dysuria, hematuria  Musculoskeletal: negative for joint swelling or pain  Skin: negative for rashes or lesions  Psych: negative for depression, anxiety  Endocrine: negative for polyuria, polydipsia, cold/heat intolerance  Hem/Lymph: negative for bleeding disorder     Objective  Blood pressure 132/78, pulse 69, temperature 36.3 °C (97.3 °F), weight 103 kg (227 lb 1.2 oz).      Physical Exam   General: alert and oriented, no apparent distress  Eyes: EOMI, clear sclera   ENMT: Moist mucous membranes, no apparent injuries or lesions  Head/Neck: NCAT  Cardiovascular: Extremities WWP  Respiratory/Thorax: Unlabored  respirations on RA  Extremities: MAEx4  Neurological: A&Ox3  Psychological: Appropriate mood and behavior  Skin: Warm and dry with no lesions or rashes  Focused exam of the breasts: Right breast incision well approximated but under tension. No warmth.  Erythema is improving.  + fluid wave in right breast.  Bullae forming at inferior incision and serous leakage from superior incision onto steristrips under tegaderms.    Diagnostics     Tissue Expander: 475 cc HP  7/9 Aspirated 180cc around TE.  Filled 180 ml into TE.  Total 180 cc  7/16 Aspirated 160cc of periexpander fluid. Filled TE with 175 ml (total 355 ml) *repeat cultures sent  7/18 Aspirated  48 ml periexpander fluid serosanguinous.    Assessment/Plan  Scott Leung is a 59 y.o. female who had right mastectomy with placement of tissue expander on 6/10/24 who presents for POV.     Follow cultures of right periexpander fluid from 7/16.  Currently no growth to date  Skin edges cleaned with alcohol and incision secured with dermabond and steristrips.  Tegaderm bra placed on the right  Continue with Levaquin course   Continue compression with bra or ace wrap  Wear right arm sling all the time except for sleep     RTC 7/23/24    Attending Attestation:  IDanica MD, personal performed the history, exam, and decision making on this patient.

## 2024-07-19 LAB
AP SUMMARY REPORT: NORMAL
BACTERIA FLD CULT: NORMAL
GRAM STN SPEC: NORMAL
GRAM STN SPEC: NORMAL
SCAN RESULT: NORMAL

## 2024-07-22 NOTE — PROGRESS NOTES
Plastic Surgery Clinic Visit    Date: 7/23/24  Date of Surgery: 6/10/24  Surgical Procedure: Right mastectomy with placement of tissue expander    History of Present Illness  Scott Leung 59 y.o. female is here for post-operative appointment for the above procedure(s).      Interval changes as of this date:   6/18: Pt here for her first post op visit. Doing well overall. Pain controlled. Endorses adequate protein intake. Doing her best to follow activity restrictions.   6/25 Doing well overall; no c/o pain.  ELIAS drains remain in place  7/2 Doing well overall. States she is maintaining activity restrictions; drain output 32 ml per day for 3 consecutive days  7/9 Doing well overall. Came in today to assess fluid collection in right breast since removing her drain last week.  7/16 Doing well overall. Here today for assessment of seroma in right breast. She was switched from augmentin to levaquin yesterday.   7/18 Presented to clinic for seroma assessment. 48cc periexpander fluid was aspirated from right breast on exam today.  7/23 She noted some fluid on steristrips under tegaderm last night. Feels some warmth. No redness or tenderness, no signs of infection. Has been following activity restrictions. Continues on levofloxacin. 50 ml of fluid aspirated.     Past Medical History:   Diagnosis Date    Abdominal adhesions 02/14/2024    Arthritis     Head injury 03/01/2023    Scoliosis 2021     Past Surgical History:   Procedure Laterality Date    INNER EAR SURGERY      KNEE ARTHROSCOPY W/ DEBRIDEMENT  10/09/2014    Knee Arthroscopy (Therapeutic)    OVARIAN CYST REMOVAL      TUBAL LIGATION      also had uterine ablation    ULNAR NERVE TRANSPOSITION      WISDOM TOOTH EXTRACTION  1985     Allergies   Allergen Reactions    Tetanus Vaccines And Toxoid Other    Latex Rash       Current Outpatient Medications:     amLODIPine (Norvasc) 10 mg tablet, TAKE 1 TABLET BY MOUTH EVERY DAY, Disp: 90 tablet, Rfl: 1    ASPIRIN LOW-STRENGTH  ORAL, Take 81 mg by mouth every other day., Disp: , Rfl:     cholecalciferol (Vitamin D-3) 25 MCG (1000 UT) capsule, Take 1 capsule (25 mcg) by mouth once daily., Disp: , Rfl:     escitalopram (Lexapro) 10 mg tablet, Take 0.5 tablets (5 mg) by mouth once daily., Disp: 45 tablet, Rfl: 3    exemestane (Aromasin) 25 mg tablet, Take 1 tablet (25 mg total) by mouth once daily.  Take after a meal.  Try to take at the same time each day., Disp: 90 tablet, Rfl: 1    hydrocortisone 2.5 % cream, Hydrocortisone 2.5 % External Cream Quantity: 56  Refills: 0  Start: 9-Aug-2021, Disp: , Rfl:     levoFLOXacin (Levaquin) 750 mg tablet, Take 1 tablet (750 mg) by mouth once daily for 10 days., Disp: 10 tablet, Rfl: 0    levothyroxine (Synthroid, Levoxyl) 25 mcg tablet, TAKE 1 TABLET (25 MCG) BY MOUTH ONCE DAILY IN THE MORNING., Disp: 90 tablet, Rfl: 1    losartan (Cozaar) 100 mg tablet, TAKE 1 TABLET BY MOUTH EVERY DAY, Disp: 90 tablet, Rfl: 0    propranolol (Inderal) 10 mg tablet, TAKE 1 TABLET BY MOUTH TWICE A DAY, Disp: 180 tablet, Rfl: 1    rizatriptan MLT (Maxalt-MLT) 10 mg disintegrating tablet, Take 1 tablet (10 mg) by mouth 1 time if needed for migraine. May repeat once in 2 hours if needed (Maximum 2 tablets in 24 hours), Disp: 9 tablet, Rfl: 1    rosuvastatin (Crestor) 20 mg tablet, TAKE 1 TABLET BY MOUTH EVERY DAY, Disp: 90 tablet, Rfl: 1   Family History   Problem Relation Name Age of Onset    Atrial fibrillation Mother Megan     Other (htn) Mother Megan     Multiple myeloma Mother Megan     Peripheral vascular disease Mother Megan     Other (thyroid disorder) Mother Megan     Arthritis Mother Megan     Liver cancer Father      Atrial fibrillation Sister Tammy     Other (crest) Sister Tammy     Other (pulmonary htn) Sister Tammy     Other (pulmonary occlusive disease) Sister Tammy     Colonic polyp Sister Tammy     Peripheral vascular disease Sister Tammy     Other (thyroid disorder) Sister Tammy     Colon cancer Sister Tammy          "detected abnormal cells and had surgery and was told family members should be screened every 5 years    Atrial fibrillation Brother Jad     Diabetes Brother Jad     Other (cabg) Brother Jad     Peripheral vascular disease Brother Jad     Atrial fibrillation Brother Kenneth     Heart disease Brother Kenneth     Atrial fibrillation Brother Sudheer     Diabetes Brother Sudheer     Heart disease Brother Sudheer     Stroke Brother Sudheer     Other (CREST) Other       Social History     Tobacco Use    Smoking status: Never    Smokeless tobacco: Never   Substance Use Topics    Alcohol use: Yes     Alcohol/week: 8.0 standard drinks of alcohol     Types: 2 Glasses of wine, 6 Cans of beer per week     Comment: Varies, social    Drug use: Never     Review of Systems   Constitutional: negative for fevers, chills, unintentional weight loss  HEENT: negative for changes in vision, headaches, changes in hearing, congestion, sore throat  Cardiovascular: negative for chest pain, palpitations  Respiratory: negative for cough, shortness of breath  Gastrointestinal: negative for nausea, vomiting, diarrhea  Genitourinary: negative for dysuria, hematuria  Musculoskeletal: negative for joint swelling or pain  Skin: negative for rashes or lesions  Psych: negative for depression, anxiety  Endocrine: negative for polyuria, polydipsia, cold/heat intolerance  Hem/Lymph: negative for bleeding disorder     Objective  Blood pressure (!) 153/96, pulse 68, height 1.651 m (5' 5\"), weight 103 kg (227 lb).      Physical Exam   General: alert and oriented, no apparent distress    Focused exam of the breasts:   Right: seroma, +fluid wave, incision under tension, 8 x 3 mm open area, good granulation tissue, steri-strips in place. Bullae improving from previous site.     Diagnostics     Tissue Expander: 475 cc HP  7/9 Aspirated 180cc around TE.  Filled 180 ml into TE.  Total 180 cc  7/16 Aspirated 160cc of periexpander fluid. Filled TE with 175 ml (total 355 " ml) *repeat cultures sent  7/18 Aspirated  48 ml periexpander fluid serosanguinous.  7/23 Aspirated 50 ml periexpander fluid    Assessment/Plan  Scott Leung is a 59 y.o. female who had right mastectomy with placement of tissue expander on 6/10/24 who presents for POV.     Continue activity restrictions.   Recommended to walk some to avoid developing a blood clot.   Refilled Levaquin to complete 21-day course  Closed wound at bedside today.  Area prepped with chloraprep and 4-0 interrupted monocryl placed.  Dressed with Aquacell and Tegaderm.  We discussed this new closure could not work and could be removed on Friday depending on evolution of wound.   Follow results of fluid cultures obtained at today's visit  Aquacell, Tegaderm placed.      RTC 2 days.     Scribe Attestation  By signing my name below, Jessica MORFIN Scribe   attest that this documentation has been prepared under the direction and in the presence of Danica Torres MD. Obtained verbal consent for scribe from patient.     Attending Attestation:  Danica MORFIN MD, personal performed the history, exam, and decision making on this patient.

## 2024-07-23 ENCOUNTER — APPOINTMENT (OUTPATIENT)
Dept: PLASTIC SURGERY | Facility: CLINIC | Age: 59
End: 2024-07-23
Payer: COMMERCIAL

## 2024-07-23 VITALS
SYSTOLIC BLOOD PRESSURE: 153 MMHG | HEIGHT: 65 IN | WEIGHT: 227 LBS | BODY MASS INDEX: 37.82 KG/M2 | DIASTOLIC BLOOD PRESSURE: 96 MMHG | HEART RATE: 68 BPM

## 2024-07-23 DIAGNOSIS — C50.111 MALIGNANT NEOPLASM OF CENTRAL PORTION OF RIGHT BREAST IN FEMALE, ESTROGEN RECEPTOR POSITIVE (MULTI): Primary | ICD-10-CM

## 2024-07-23 DIAGNOSIS — N64.89 SEROMA OF BREAST: ICD-10-CM

## 2024-07-23 DIAGNOSIS — Z17.0 MALIGNANT NEOPLASM OF CENTRAL PORTION OF RIGHT BREAST IN FEMALE, ESTROGEN RECEPTOR POSITIVE (MULTI): Primary | ICD-10-CM

## 2024-07-23 PROCEDURE — 87075 CULTR BACTERIA EXCEPT BLOOD: CPT

## 2024-07-23 PROCEDURE — 99024 POSTOP FOLLOW-UP VISIT: CPT | Performed by: SURGERY

## 2024-07-23 PROCEDURE — 3008F BODY MASS INDEX DOCD: CPT | Performed by: SURGERY

## 2024-07-23 PROCEDURE — 3077F SYST BP >= 140 MM HG: CPT | Performed by: SURGERY

## 2024-07-23 PROCEDURE — 87070 CULTURE OTHR SPECIMN AEROBIC: CPT

## 2024-07-23 PROCEDURE — 3080F DIAST BP >= 90 MM HG: CPT | Performed by: SURGERY

## 2024-07-23 PROCEDURE — 87206 SMEAR FLUORESCENT/ACID STAI: CPT

## 2024-07-23 PROCEDURE — 87205 SMEAR GRAM STAIN: CPT

## 2024-07-23 PROCEDURE — 87102 FUNGUS ISOLATION CULTURE: CPT

## 2024-07-23 RX ORDER — LEVOFLOXACIN 750 MG/1
750 TABLET ORAL DAILY
Qty: 11 TABLET | Refills: 0 | Status: SHIPPED | OUTPATIENT
Start: 2024-07-23 | End: 2024-07-30 | Stop reason: SDUPTHER

## 2024-07-25 ENCOUNTER — APPOINTMENT (OUTPATIENT)
Dept: PLASTIC SURGERY | Facility: CLINIC | Age: 59
End: 2024-07-25
Payer: COMMERCIAL

## 2024-07-25 VITALS
WEIGHT: 230 LBS | BODY MASS INDEX: 38.32 KG/M2 | HEIGHT: 65 IN | DIASTOLIC BLOOD PRESSURE: 91 MMHG | SYSTOLIC BLOOD PRESSURE: 140 MMHG | HEART RATE: 84 BPM

## 2024-07-25 DIAGNOSIS — N64.89 SEROMA OF BREAST: Primary | ICD-10-CM

## 2024-07-25 PROCEDURE — 3008F BODY MASS INDEX DOCD: CPT | Performed by: PHYSICIAN ASSISTANT

## 2024-07-25 PROCEDURE — 1036F TOBACCO NON-USER: CPT | Performed by: PHYSICIAN ASSISTANT

## 2024-07-25 PROCEDURE — 3077F SYST BP >= 140 MM HG: CPT | Performed by: PHYSICIAN ASSISTANT

## 2024-07-25 PROCEDURE — 3080F DIAST BP >= 90 MM HG: CPT | Performed by: PHYSICIAN ASSISTANT

## 2024-07-25 PROCEDURE — 99024 POSTOP FOLLOW-UP VISIT: CPT | Performed by: PHYSICIAN ASSISTANT

## 2024-07-25 NOTE — PROGRESS NOTES
Plastic Surgery Clinic Visit    Date: 7/25/24  Date of Surgery: 6/10/24  Surgical Procedure: Right mastectomy with placement of tissue expander    History of Present Illness  Scott Leung 59 y.o. female is here for post-operative appointment for the above procedure(s).      Interval changes as of this date:   6/18: Pt here for her first post op visit. Doing well overall. Pain controlled. Endorses adequate protein intake. Doing her best to follow activity restrictions.   6/25 Doing well overall; no c/o pain.  ELIAS drains remain in place  7/2 Doing well overall. States she is maintaining activity restrictions; drain output 32 ml per day for 3 consecutive days  7/9 Doing well overall. Came in today to assess fluid collection in right breast since removing her drain last week.  7/16 Doing well overall. Here today for assessment of seroma in right breast. She was switched from augmentin to levaquin yesterday.   7/18 Presented to clinic for seroma assessment. 48cc periexpander fluid was aspirated from right breast on exam today.  7/23 She noted some fluid last night. Feels some warmth. No redness or tenderness, no signs of infection. Has been following activity restrictions. Continues on levofloxacin. 50 ml of fluid aspirated.   7/25       Past Medical History:   Diagnosis Date    Abdominal adhesions 02/14/2024    Arthritis     Head injury 03/01/2023    Scoliosis 2021     Past Surgical History:   Procedure Laterality Date    INNER EAR SURGERY      KNEE ARTHROSCOPY W/ DEBRIDEMENT  10/09/2014    Knee Arthroscopy (Therapeutic)    OVARIAN CYST REMOVAL      TUBAL LIGATION      also had uterine ablation    ULNAR NERVE TRANSPOSITION      WISDOM TOOTH EXTRACTION  1985     Allergies   Allergen Reactions    Tetanus Vaccines And Toxoid Other    Latex Rash       Current Outpatient Medications:     amLODIPine (Norvasc) 10 mg tablet, TAKE 1 TABLET BY MOUTH EVERY DAY, Disp: 90 tablet, Rfl: 1    ASPIRIN LOW-STRENGTH ORAL, Take 81 mg by  mouth every other day., Disp: , Rfl:     cholecalciferol (Vitamin D-3) 25 MCG (1000 UT) capsule, Take 1 capsule (25 mcg) by mouth once daily., Disp: , Rfl:     escitalopram (Lexapro) 10 mg tablet, Take 0.5 tablets (5 mg) by mouth once daily., Disp: 45 tablet, Rfl: 3    exemestane (Aromasin) 25 mg tablet, Take 1 tablet (25 mg total) by mouth once daily.  Take after a meal.  Try to take at the same time each day., Disp: 90 tablet, Rfl: 1    hydrocortisone 2.5 % cream, Hydrocortisone 2.5 % External Cream Quantity: 56  Refills: 0  Start: 9-Aug-2021, Disp: , Rfl:     levoFLOXacin (Levaquin) 750 mg tablet, Take 1 tablet (750 mg) by mouth once daily for 11 days., Disp: 11 tablet, Rfl: 0    levothyroxine (Synthroid, Levoxyl) 25 mcg tablet, TAKE 1 TABLET (25 MCG) BY MOUTH ONCE DAILY IN THE MORNING., Disp: 90 tablet, Rfl: 1    losartan (Cozaar) 100 mg tablet, TAKE 1 TABLET BY MOUTH EVERY DAY, Disp: 90 tablet, Rfl: 0    propranolol (Inderal) 10 mg tablet, TAKE 1 TABLET BY MOUTH TWICE A DAY, Disp: 180 tablet, Rfl: 1    rizatriptan MLT (Maxalt-MLT) 10 mg disintegrating tablet, Take 1 tablet (10 mg) by mouth 1 time if needed for migraine. May repeat once in 2 hours if needed (Maximum 2 tablets in 24 hours), Disp: 9 tablet, Rfl: 1    rosuvastatin (Crestor) 20 mg tablet, TAKE 1 TABLET BY MOUTH EVERY DAY, Disp: 90 tablet, Rfl: 1   Family History   Problem Relation Name Age of Onset    Atrial fibrillation Mother Megan     Other (htn) Mother Megan     Multiple myeloma Mother Megan     Peripheral vascular disease Mother Megan     Other (thyroid disorder) Mother Megan     Arthritis Mother Megan     Liver cancer Father      Atrial fibrillation Sister Tammy     Other (crest) Sister Tammy     Other (pulmonary htn) Sister Tammy     Other (pulmonary occlusive disease) Sister Tammy     Colonic polyp Sister Tammy     Peripheral vascular disease Sister Tammy     Other (thyroid disorder) Sister Tammy     Colon cancer Sister Tammy         detected abnormal  cells and had surgery and was told family members should be screened every 5 years    Atrial fibrillation Brother Jad     Diabetes Brother Jad     Other (cabg) Brother Jad     Peripheral vascular disease Brother Jad     Atrial fibrillation Brother Kenneth     Heart disease Brother Kenneth     Atrial fibrillation Brother Sudheer     Diabetes Brother Sudheer     Heart disease Brother Sudheer     Stroke Brother Sudheer     Other (CREST) Other       Social History     Tobacco Use    Smoking status: Never    Smokeless tobacco: Never   Substance Use Topics    Alcohol use: Yes     Alcohol/week: 8.0 standard drinks of alcohol     Types: 2 Glasses of wine, 6 Cans of beer per week     Comment: Varies, social    Drug use: Never     Review of Systems   Constitutional: negative for fevers, chills, unintentional weight loss  HEENT: negative for changes in vision, headaches, changes in hearing, congestion, sore throat  Cardiovascular: negative for chest pain, palpitations  Respiratory: negative for cough, shortness of breath  Gastrointestinal: negative for nausea, vomiting, diarrhea  Genitourinary: negative for dysuria, hematuria  Musculoskeletal: negative for joint swelling or pain  Skin: negative for rashes or lesions  Psych: negative for depression, anxiety  Endocrine: negative for polyuria, polydipsia, cold/heat intolerance  Hem/Lymph: negative for bleeding disorder     Objective  There were no vitals taken for this visit.      Physical Exam   General: alert and oriented, no apparent distress    Focused exam of the breasts:   Right:    Diagnostics     Tissue Expander: 475 cc HP  7/9 Aspirated 180cc around TE.  Filled 180 ml into TE.  Total 180 cc  7/16 Aspirated 160cc of periexpander fluid. Filled TE with 175 ml (total 355 ml) *repeat cultures sent  7/18 Aspirated  48 ml periexpander fluid serosanguinous.  7/23 Aspirated 50 ml periexpander fluid  7/25     Assessment/Plan  Scott Leung is a 59 y.o. female who had right mastectomy  with placement of tissue expander on 6/10/24 who presents for POV.     Continue activity restrictions.   Recommended to walk some to avoid developing a blood clot.   Continue with Levaquin  Aquacell, Tegaderm placed.      RTC 7/30/24

## 2024-07-25 NOTE — PROGRESS NOTES
Plastic Surgery Clinic Visit    Date: 7/25/24  Date of Surgery: 6/10/24  Surgical Procedure: Right mastectomy with placement of tissue expander    History of Present Illness  Scott Leung 59 y.o. female is here for post-operative appointment for the above procedure(s).      Interval changes as of this date:   6/18: Pt here for her first post op visit. Doing well overall. Pain controlled. Endorses adequate protein intake. Doing her best to follow activity restrictions.   6/25 Doing well overall; no c/o pain.  ELIAS drains remain in place  7/2 Doing well overall. States she is maintaining activity restrictions; drain output 32 ml per day for 3 consecutive days  7/9 Doing well overall. Came in today to assess fluid collection in right breast since removing her drain last week.  7/16 Doing well overall. Here today for assessment of seroma in right breast. She was switched from augmentin to levaquin yesterday.   7/18 Presented to clinic for seroma assessment. 48cc periexpander fluid was aspirated from right breast on exam today.  7/23 She noted some fluid last night. Feels some warmth. No redness or tenderness, no signs of infection. Has been following activity restrictions. Continues on levofloxacin. 50 ml of fluid aspirated.   7/25 Admits to redness under the tegaderm. Cultures reviewed, no growth to date. Incisions well approximated, no erythema or tenderness around incision sites. Has been following activity restrictions. May end levofloxacin on 8/3. No fluid on aspiration.  Possibly hyperreactivity reactions to banadages/tegaderm.       Past Medical History:   Diagnosis Date    Abdominal adhesions 02/14/2024    Arthritis     Head injury 03/01/2023    Scoliosis 2021     Past Surgical History:   Procedure Laterality Date    INNER EAR SURGERY      KNEE ARTHROSCOPY W/ DEBRIDEMENT  10/09/2014    Knee Arthroscopy (Therapeutic)    OVARIAN CYST REMOVAL      TUBAL LIGATION      also had uterine ablation    ULNAR NERVE  TRANSPOSITION      WISDOM TOOTH EXTRACTION  1985     Allergies   Allergen Reactions    Tetanus Vaccines And Toxoid Other    Latex Rash       Current Outpatient Medications:     amLODIPine (Norvasc) 10 mg tablet, TAKE 1 TABLET BY MOUTH EVERY DAY, Disp: 90 tablet, Rfl: 1    ASPIRIN LOW-STRENGTH ORAL, Take 81 mg by mouth every other day., Disp: , Rfl:     cholecalciferol (Vitamin D-3) 25 MCG (1000 UT) capsule, Take 1 capsule (25 mcg) by mouth once daily., Disp: , Rfl:     escitalopram (Lexapro) 10 mg tablet, Take 0.5 tablets (5 mg) by mouth once daily., Disp: 45 tablet, Rfl: 3    exemestane (Aromasin) 25 mg tablet, Take 1 tablet (25 mg total) by mouth once daily.  Take after a meal.  Try to take at the same time each day., Disp: 90 tablet, Rfl: 1    hydrocortisone 2.5 % cream, Hydrocortisone 2.5 % External Cream Quantity: 56  Refills: 0  Start: 9-Aug-2021, Disp: , Rfl:     levoFLOXacin (Levaquin) 750 mg tablet, Take 1 tablet (750 mg) by mouth once daily for 11 days., Disp: 11 tablet, Rfl: 0    levothyroxine (Synthroid, Levoxyl) 25 mcg tablet, TAKE 1 TABLET (25 MCG) BY MOUTH ONCE DAILY IN THE MORNING., Disp: 90 tablet, Rfl: 1    losartan (Cozaar) 100 mg tablet, TAKE 1 TABLET BY MOUTH EVERY DAY, Disp: 90 tablet, Rfl: 0    propranolol (Inderal) 10 mg tablet, TAKE 1 TABLET BY MOUTH TWICE A DAY, Disp: 180 tablet, Rfl: 1    rizatriptan MLT (Maxalt-MLT) 10 mg disintegrating tablet, Take 1 tablet (10 mg) by mouth 1 time if needed for migraine. May repeat once in 2 hours if needed (Maximum 2 tablets in 24 hours), Disp: 9 tablet, Rfl: 1    rosuvastatin (Crestor) 20 mg tablet, TAKE 1 TABLET BY MOUTH EVERY DAY, Disp: 90 tablet, Rfl: 1   Family History   Problem Relation Name Age of Onset    Atrial fibrillation Mother Megan     Other (htn) Mother Megan     Multiple myeloma Mother Megan     Peripheral vascular disease Mother Megan     Other (thyroid disorder) Mother Megan     Arthritis Mother Megan     Liver cancer Father      Atrial  "fibrillation Sister Tammy     Other (crest) Sister Tammy     Other (pulmonary htn) Sister Tammy     Other (pulmonary occlusive disease) Sister Tammy     Colonic polyp Sister Tammy     Peripheral vascular disease Sister Tammy     Other (thyroid disorder) Sister Tammy     Colon cancer Sister Tammy         detected abnormal cells and had surgery and was told family members should be screened every 5 years    Atrial fibrillation Brother Jad     Diabetes Brother Jad     Other (cabg) Brother Jad     Peripheral vascular disease Brother Jad     Atrial fibrillation Brother Kenneth     Heart disease Brother Kenneth     Atrial fibrillation Brother Sudheer     Diabetes Brother Sudheer     Heart disease Brother Sudheer     Stroke Brother Sudheer     Other (CREST) Other       Social History     Tobacco Use    Smoking status: Never    Smokeless tobacco: Never   Substance Use Topics    Alcohol use: Yes     Alcohol/week: 8.0 standard drinks of alcohol     Types: 2 Glasses of wine, 6 Cans of beer per week     Comment: Varies, social    Drug use: Never     Review of Systems   Constitutional: negative for fevers, chills, unintentional weight loss  HEENT: negative for changes in vision, headaches, changes in hearing, congestion, sore throat  Cardiovascular: negative for chest pain, palpitations  Respiratory: negative for cough, shortness of breath  Gastrointestinal: negative for nausea, vomiting, diarrhea  Genitourinary: negative for dysuria, hematuria  Musculoskeletal: negative for joint swelling or pain  Skin: negative for rashes or lesions  Psych: negative for depression, anxiety  Endocrine: negative for polyuria, polydipsia, cold/heat intolerance  Hem/Lymph: negative for bleeding disorder     Objective  Blood pressure (!) 140/91, pulse 84, height 1.651 m (5' 5\"), weight 104 kg (230 lb).      Physical Exam   General: alert and oriented, no apparent distress  Eyes: EOMI, clear sclera   ENMT: Moist mucous membranes, no apparent injuries or " lesions  Head/Neck: NCAT  Cardiovascular: Extremities WWP  Respiratory/Thorax: Unlabored respirations on RA  Extremities: MAEx4  Neurological: A&Ox3  Psychological: Appropriate mood and behavior  Skin: Warm and dry with no lesions or rashes  Focused exam of the breasts:   Right: ELIAS drain site at the right ribs, no erythema, drainage, or tenderness.  Right breast incision well approximated, under tension. Mild erythema and bruising noted around breast, specifically under tegaderm and bandages. Bulla noted on superior lateral edge of tegaderm. Likely hypersensitivity reaction to bandages. No redness, dehiscence, or drainage noted from incisions. No fluid wave on palpation.       Diagnostics     Tissue Expander: 475 cc HP  7/9 Aspirated 180cc around TE.  Filled 180 ml into TE.  Total 180 cc  7/16 Aspirated 160cc of periexpander fluid. Filled TE with 175 ml (total 355 ml) *repeat cultures sent  7/18 Aspirated  48 ml periexpander fluid serosanguinous.  7/23 Aspirated 50 ml periexpander fluid  7/25 No fluid on aspiration    Assessment/Plan  Scott Leung is a 59 y.o. female who had right mastectomy with placement of tissue expander on 6/10/24 who presents for POV.     Continue activity restrictions.   Recommended to walk some to avoid developing a blood clot.   Continue with Levaquin, last day 8/3.   Aquacell, Tegaderm placed.   Suggested benadryl for pruritus and redness caused by hypersensitivity reaction to adhesive bandages.     RTC 7/30/24

## 2024-07-26 LAB
BACTERIA SPEC CULT: NORMAL
FUNGUS SPEC CULT: NORMAL
FUNGUS SPEC FUNGUS STN: NORMAL
GRAM STN SPEC: NORMAL
GRAM STN SPEC: NORMAL

## 2024-07-26 NOTE — PROGRESS NOTES
Plastic Surgery Clinic Visit    Date: 7/30/24  Date of Surgery: 6/10/24  Surgical Procedure: Right mastectomy with placement of tissue expander    History of Present Illness  Scott Leung 59 y.o. female is here for post-operative appointment for the above procedure(s).      Interval changes as of this date:   6/18: Pt here for her first post op visit. Doing well overall. Pain controlled. Endorses adequate protein intake. Doing her best to follow activity restrictions.   6/25 Doing well overall; no c/o pain.  ELIAS drains remain in place  7/2 Doing well overall. States she is maintaining activity restrictions; drain output 32 ml per day for 3 consecutive days  7/9 Doing well overall. Came in today to assess fluid collection in right breast since removing her drain last week.  7/16 Doing well overall. Here today for assessment of seroma in right breast. She was switched from augmentin to levaquin yesterday.   7/18 Presented to clinic for seroma assessment. 48cc periexpander fluid was aspirated from right breast on exam today.  7/23 She noted some fluid last night. Feels some warmth. No redness or tenderness, no signs of infection. Has been following activity restrictions. Continues on levofloxacin. 50 ml of fluid aspirated.   7/25 Admits to redness under the tegaderm.  7/30 Doing well. No change from Thursday. Wearing right arm sling.      Past Medical History:   Diagnosis Date    Abdominal adhesions 02/14/2024    Arthritis     Head injury 03/01/2023    Scoliosis 2021     Past Surgical History:   Procedure Laterality Date    INNER EAR SURGERY      KNEE ARTHROSCOPY W/ DEBRIDEMENT  10/09/2014    Knee Arthroscopy (Therapeutic)    OVARIAN CYST REMOVAL      TUBAL LIGATION      also had uterine ablation    ULNAR NERVE TRANSPOSITION      WISDOM TOOTH EXTRACTION  1985     Allergies   Allergen Reactions    Tetanus Vaccines And Toxoid Other    Latex Rash       Current Outpatient Medications:     amLODIPine (Norvasc) 10 mg tablet,  TAKE 1 TABLET BY MOUTH EVERY DAY, Disp: 90 tablet, Rfl: 1    ASPIRIN LOW-STRENGTH ORAL, Take 81 mg by mouth every other day., Disp: , Rfl:     cholecalciferol (Vitamin D-3) 25 MCG (1000 UT) capsule, Take 1 capsule (25 mcg) by mouth once daily., Disp: , Rfl:     escitalopram (Lexapro) 10 mg tablet, Take 0.5 tablets (5 mg) by mouth once daily., Disp: 45 tablet, Rfl: 3    exemestane (Aromasin) 25 mg tablet, Take 1 tablet (25 mg total) by mouth once daily.  Take after a meal.  Try to take at the same time each day., Disp: 90 tablet, Rfl: 1    hydrocortisone 2.5 % cream, Hydrocortisone 2.5 % External Cream Quantity: 56  Refills: 0  Start: 9-Aug-2021, Disp: , Rfl:     levoFLOXacin (Levaquin) 750 mg tablet, Take 1 tablet (750 mg) by mouth once daily for 11 days., Disp: 11 tablet, Rfl: 0    levothyroxine (Synthroid, Levoxyl) 25 mcg tablet, TAKE 1 TABLET (25 MCG) BY MOUTH ONCE DAILY IN THE MORNING., Disp: 90 tablet, Rfl: 1    losartan (Cozaar) 100 mg tablet, TAKE 1 TABLET BY MOUTH EVERY DAY, Disp: 90 tablet, Rfl: 0    propranolol (Inderal) 10 mg tablet, TAKE 1 TABLET BY MOUTH TWICE A DAY, Disp: 180 tablet, Rfl: 1    rizatriptan MLT (Maxalt-MLT) 10 mg disintegrating tablet, Take 1 tablet (10 mg) by mouth 1 time if needed for migraine. May repeat once in 2 hours if needed (Maximum 2 tablets in 24 hours), Disp: 9 tablet, Rfl: 1    rosuvastatin (Crestor) 20 mg tablet, TAKE 1 TABLET BY MOUTH EVERY DAY, Disp: 90 tablet, Rfl: 1   Family History   Problem Relation Name Age of Onset    Atrial fibrillation Mother Megan     Other (htn) Mother Megan     Multiple myeloma Mother Megan     Peripheral vascular disease Mother Megan     Other (thyroid disorder) Mother Megan     Arthritis Mother Megan     Liver cancer Father      Atrial fibrillation Sister Tammy     Other (crest) Sister Tammy     Other (pulmonary htn) Sister Tammy     Other (pulmonary occlusive disease) Sister Tammy     Colonic polyp Sister Tammy     Peripheral vascular disease Sister  "Tammy     Other (thyroid disorder) Sister Tammy     Colon cancer Sister Tammy         detected abnormal cells and had surgery and was told family members should be screened every 5 years    Atrial fibrillation Brother Jad     Diabetes Brother Jad     Other (cabg) Brother Jad     Peripheral vascular disease Brother Jad     Atrial fibrillation Brother Kenneth     Heart disease Brother Kenneth     Atrial fibrillation Brother Sudheer     Diabetes Brother Sudheer     Heart disease Brother Sudheer     Stroke Brother Sudheer     Other (CREST) Other       Social History     Tobacco Use    Smoking status: Never    Smokeless tobacco: Never   Substance Use Topics    Alcohol use: Yes     Alcohol/week: 8.0 standard drinks of alcohol     Types: 2 Glasses of wine, 6 Cans of beer per week     Comment: Varies, social    Drug use: Never     Review of Systems   Constitutional: negative for fevers, chills, unintentional weight loss  HEENT: negative for changes in vision, headaches, changes in hearing, congestion, sore throat  Cardiovascular: negative for chest pain, palpitations  Respiratory: negative for cough, shortness of breath  Gastrointestinal: negative for nausea, vomiting, diarrhea  Genitourinary: negative for dysuria, hematuria  Musculoskeletal: negative for joint swelling or pain  Skin: negative for rashes or lesions  Psych: negative for depression, anxiety  Endocrine: negative for polyuria, polydipsia, cold/heat intolerance  Hem/Lymph: negative for bleeding disorder     Objective  Blood pressure 141/81, pulse 65, height 1.651 m (5' 5\"), weight 104 kg (230 lb).    Physical Exam   General: alert and oriented, no apparent distress    Focused exam of the breasts:  Right: Right breast incision well approximated, under tension. Mild erythema and bruising noted around breast, specifically under tegaderm and bandages. No redness, dehiscence, or drainage noted from incisions. Minimal fluid wave on palpation.       Diagnostics   Tissue " Expander: 475 cc HP  7/9 Aspirated 180cc around TE.  Filled 180 ml into TE.  Total 180 cc  7/16 Aspirated 160cc of periexpander fluid. Filled TE with 175 ml (total 355 ml) *repeat cultures sent  7/18 Aspirated 48 ml periexpander fluid serosanguinous.  7/23 Aspirated 50 ml periexpander fluid  7/25 No fluid on aspiration  7/30 Aspirated 40 ml periexpander fluid serosanguinous. Filled TE with 50 ml (total 405 ml)    Assessment/Plan  Scott Leung is a 59 y.o. female who had right mastectomy with placement of tissue expander on 6/10/24 who presents for POV.     Aspirated 40 ml and filled with 50 ml. Tolerated well. Sutures removed. Steri-strips re-applied to incision.    Cultures still no growth. Continue levaquin for additional 3 weeks to complete a full 6-week course.  Continue activity restrictions and wear arm sling.  Continue to track protein.     RTC Thursday for seroma check    Attending Attestation:  IDanica MD, personal performed the history, exam, and decision making on this patient.

## 2024-07-29 LAB
FUNGUS SPEC CULT: NORMAL
FUNGUS SPEC FUNGUS STN: NORMAL

## 2024-07-30 ENCOUNTER — APPOINTMENT (OUTPATIENT)
Dept: PLASTIC SURGERY | Facility: CLINIC | Age: 59
End: 2024-07-30
Payer: COMMERCIAL

## 2024-07-30 VITALS
BODY MASS INDEX: 38.32 KG/M2 | HEART RATE: 65 BPM | DIASTOLIC BLOOD PRESSURE: 81 MMHG | WEIGHT: 230 LBS | SYSTOLIC BLOOD PRESSURE: 141 MMHG | HEIGHT: 65 IN

## 2024-07-30 DIAGNOSIS — N64.89 SEROMA OF BREAST: ICD-10-CM

## 2024-07-30 PROCEDURE — 3077F SYST BP >= 140 MM HG: CPT | Performed by: SURGERY

## 2024-07-30 PROCEDURE — 99024 POSTOP FOLLOW-UP VISIT: CPT | Performed by: SURGERY

## 2024-07-30 PROCEDURE — 3008F BODY MASS INDEX DOCD: CPT | Performed by: SURGERY

## 2024-07-30 PROCEDURE — 3079F DIAST BP 80-89 MM HG: CPT | Performed by: SURGERY

## 2024-07-30 RX ORDER — LEVOFLOXACIN 750 MG/1
750 TABLET ORAL DAILY
Qty: 21 TABLET | Refills: 0 | Status: SHIPPED | OUTPATIENT
Start: 2024-07-30 | End: 2024-08-20

## 2024-07-31 NOTE — H&P (VIEW-ONLY)
Plastic Surgery Clinic Visit    Date: 8/1/24  Date of Surgery: 6/10/24  Surgical Procedure: Right mastectomy with placement of tissue expander    History of Present Illness  Scott Leung 59 y.o. female is here for post-operative appointment for the above procedure(s).      Interval changes as of this date:   6/18: Pt here for her first post op visit. Doing well overall. Pain controlled. Endorses adequate protein intake. Doing her best to follow activity restrictions.   6/25 Doing well overall; no c/o pain.  ELIAS drains remain in place  7/2 Doing well overall. States she is maintaining activity restrictions; drain output 32 ml per day for 3 consecutive days  7/9 Doing well overall. Came in today to assess fluid collection in right breast since removing her drain last week.  7/16 Doing well overall. Here today for assessment of seroma in right breast. She was switched from augmentin to levaquin yesterday.   7/18 Presented to clinic for seroma assessment. 48cc periexpander fluid was aspirated from right breast on exam today.  7/23 She noted some fluid last night. Feels some warmth. No redness or tenderness, no signs of infection. Has been following activity restrictions. Continues on levofloxacin. 50 ml of fluid aspirated.   7/25 Admits to redness under the tegaderm.  7/30 Doing well. No change from Thursday. Wearing right arm sling.  8/1/24 patient continues to wear her arm sling and is adhering to activity restrictions and nutritional optimization.  She has not noticed any drainage from her incision.    Past Medical History:   Diagnosis Date    Abdominal adhesions 02/14/2024    Arthritis     Head injury 03/01/2023    Scoliosis 2021     Past Surgical History:   Procedure Laterality Date    INNER EAR SURGERY      KNEE ARTHROSCOPY W/ DEBRIDEMENT  10/09/2014    Knee Arthroscopy (Therapeutic)    OVARIAN CYST REMOVAL      TUBAL LIGATION      also had uterine ablation    ULNAR NERVE TRANSPOSITION      WISDOM TOOTH EXTRACTION   1985     Allergies   Allergen Reactions    Tetanus Vaccines And Toxoid Other    Latex Rash       Current Outpatient Medications:     amLODIPine (Norvasc) 10 mg tablet, TAKE 1 TABLET BY MOUTH EVERY DAY, Disp: 90 tablet, Rfl: 1    ASPIRIN LOW-STRENGTH ORAL, Take 81 mg by mouth every other day., Disp: , Rfl:     cholecalciferol (Vitamin D-3) 25 MCG (1000 UT) capsule, Take 1 capsule (25 mcg) by mouth once daily., Disp: , Rfl:     escitalopram (Lexapro) 10 mg tablet, Take 0.5 tablets (5 mg) by mouth once daily., Disp: 45 tablet, Rfl: 3    exemestane (Aromasin) 25 mg tablet, Take 1 tablet (25 mg total) by mouth once daily.  Take after a meal.  Try to take at the same time each day., Disp: 90 tablet, Rfl: 1    hydrocortisone 2.5 % cream, Hydrocortisone 2.5 % External Cream Quantity: 56  Refills: 0  Start: 9-Aug-2021, Disp: , Rfl:     levoFLOXacin (Levaquin) 750 mg tablet, Take 1 tablet (750 mg) by mouth once daily for 21 days., Disp: 21 tablet, Rfl: 0    levothyroxine (Synthroid, Levoxyl) 25 mcg tablet, TAKE 1 TABLET (25 MCG) BY MOUTH ONCE DAILY IN THE MORNING., Disp: 90 tablet, Rfl: 1    losartan (Cozaar) 100 mg tablet, TAKE 1 TABLET BY MOUTH EVERY DAY, Disp: 90 tablet, Rfl: 0    propranolol (Inderal) 10 mg tablet, TAKE 1 TABLET BY MOUTH TWICE A DAY, Disp: 180 tablet, Rfl: 1    rizatriptan MLT (Maxalt-MLT) 10 mg disintegrating tablet, Take 1 tablet (10 mg) by mouth 1 time if needed for migraine. May repeat once in 2 hours if needed (Maximum 2 tablets in 24 hours), Disp: 9 tablet, Rfl: 1    rosuvastatin (Crestor) 20 mg tablet, TAKE 1 TABLET BY MOUTH EVERY DAY, Disp: 90 tablet, Rfl: 1   Family History   Problem Relation Name Age of Onset    Atrial fibrillation Mother Megan     Other (htn) Mother Megan     Multiple myeloma Mother Megan     Peripheral vascular disease Mother Megan     Other (thyroid disorder) Mother Megan     Arthritis Mother Megan     Liver cancer Father      Atrial fibrillation Sister Tammy     Other (crest)  "Sister Tammy     Other (pulmonary htn) Sister Tammy     Other (pulmonary occlusive disease) Sister Tammy     Colonic polyp Sister Tammy     Peripheral vascular disease Sister Tammy     Other (thyroid disorder) Sister Tammy     Colon cancer Sister Tammy         detected abnormal cells and had surgery and was told family members should be screened every 5 years    Atrial fibrillation Brother Jad     Diabetes Brother Jad     Other (cabg) Brother Jad     Peripheral vascular disease Brother Jad     Atrial fibrillation Brother Kenneth     Heart disease Brother Kenneth     Atrial fibrillation Brother Sudheer     Diabetes Brother Sudheer     Heart disease Brother Sudheer     Stroke Brother Sudheer     Other (CREST) Other       Social History     Tobacco Use    Smoking status: Never    Smokeless tobacco: Never   Substance Use Topics    Alcohol use: Yes     Alcohol/week: 8.0 standard drinks of alcohol     Types: 2 Glasses of wine, 6 Cans of beer per week     Comment: Varies, social    Drug use: Never     Review of Systems   Constitutional: negative for fevers, chills, unintentional weight loss  HEENT: negative for changes in vision, headaches, changes in hearing, congestion, sore throat  Cardiovascular: negative for chest pain, palpitations  Respiratory: negative for cough, shortness of breath  Gastrointestinal: negative for nausea, vomiting, diarrhea  Genitourinary: negative for dysuria, hematuria  Musculoskeletal: negative for joint swelling or pain  Skin: negative for rashes or lesions  Psych: negative for depression, anxiety  Endocrine: negative for polyuria, polydipsia, cold/heat intolerance  Hem/Lymph: negative for bleeding disorder     Objective    Blood pressure 140/78, pulse 69, height 1.651 m (5' 5\"), weight 104 kg (230 lb).      Physical Exam   General: alert and oriented, no apparent distress    Focused exam of the breasts:  Right: Right breast incision well approximated, under tension. Mild erythema and bruising noted " around breast, specifically under tegaderm and bandages.  Lateral pole erythema has improved.  However there is still prominent telangiectasias in the lateral pole as well as around the incision.  On the superior portion of the incision is very thin epidermal tissue over the incision and the deeper dermis does not appear intact.  On the inferior portion of the incision there is a partial-thickness dehiscence of measuring 4 x 8 mm.  At the base is some fibrinous exudate.  Along the lateral suture tract from her former suture superiorly, a minimal amount of serous drainage can be expressed with squeezing of the skin flaps.  Small fluid wave on palpation.       Diagnostics   Tissue Expander: 475 cc HP  7/9 Aspirated 180cc around TE.  Filled 180 ml into TE.  Total 180 cc  7/16 Aspirated 160cc of periexpander fluid. Filled TE with 175 ml (total 355 ml) *repeat cultures sent  7/18 Aspirated 48 ml periexpander fluid serosanguinous.  7/23 Aspirated 50 ml periexpander fluid  7/25 No fluid on aspiration  7/30 Aspirated 40 ml periexpander fluid serosanguinous. Filled TE with 50 ml (total 405 ml)  8/1/24 Aspirated 20 ml periexpander serosanguinous fluid   Assessment/Plan  Scott Leung is a 59 y.o. female who had right mastectomy with placement of tissue expander on 6/10/24 who presents for POV.   At this point it is clear that while we have managed to avoid stephen infection of her tissue expander, she is continuing to have recurrent seromas.  This has necessitated the rapid expansion of her tissue expander in order to try to fill in the dead space and decrease the seroma.  Because of this she has tension on her incision and it is frankly breaking down.  Cleaning up the tissue after the aspiration broke down the fragile and friable epidermal layer on the superior aspect of her incision and created a full-thickness wound.  I cleaned this further with ChloraPrep and alcohol.  And oppose the edges together and placed Dermabond on  it that I then bolstered with 1 inch Steri-Strips across the entire vertical incision.  I then bolster this with Aquacel Ag in case there is any leakage and placed Tegaderm as a bra over the wound to help support the tissue.      I am fearful she is at very high risk for developing an infection in the setting of chronic seroma which is protein rich and increases her risk for infection especially since her incision continues to breakdown.  I believe we need to take her to the operating room reopen this incision and remove her tissue expander to remove the entire capsule that has formed and washed out this pocket completely.  I believe we need to debride any existing capsule to remove potential colonization of bacteria and decrease her risk of infection and free formation of seroma.  Because of the expansion that has occurred, rather than replace her with a another tissue expander I believe we can place a and implant in at this time.  I will also place drains.  She still at risk for infection however I think this is the best way to try to salvage her reconstruction.  I will post her for this Monday because she is such high risk for infection and complete loss of the implant.  Should she get infected we would have to take her to the operating room regardless.  Therefore I would like this opportunity to try to salvage this reconstruction.      Cultures still no growth. Continue levaquin for additional 3 weeks to complete a full 6-week course.  Continue activity restrictions and wear arm sling.  Surgical date set for 8/5/24 for tissue expander for implant exchange, capsulectomy, and washout     RTC 8/13.  Consent obtained.    Attending Attestation:  IDanica MD, personal performed the history, exam, and decision making on this patient.  A total of 30 mins were spent in patient counseling, review of medical records, and coordination of care.      Ordering:  Mod classic  350-7275 275 cc 12.5 cm 3.0  cm [id number]  350-7300MC 300 cc 12.8 cm 3.1 cm [id number]  350-7320MC 320 cc 13.0 cm 3.2 cm [id number]    Mod plus  350-3501  cc 12.5 cm 3.9 cm [id number]  350-3751  cc 12.8 cm 4.0 cm [id number]  350-4001  cc 13.1 cm 4.0 cm [id number]    High profile  350-4004  cc 12.2 cm 5.0 cm [id number]

## 2024-08-01 ENCOUNTER — OFFICE VISIT (OUTPATIENT)
Dept: PLASTIC SURGERY | Facility: CLINIC | Age: 59
End: 2024-08-01
Payer: COMMERCIAL

## 2024-08-01 VITALS
DIASTOLIC BLOOD PRESSURE: 78 MMHG | BODY MASS INDEX: 38.32 KG/M2 | HEART RATE: 69 BPM | SYSTOLIC BLOOD PRESSURE: 140 MMHG | WEIGHT: 230 LBS | HEIGHT: 65 IN

## 2024-08-01 DIAGNOSIS — S21.001S OPEN WOUND OF BREAST WITH COMPLICATION, RIGHT, SEQUELA: ICD-10-CM

## 2024-08-01 DIAGNOSIS — N64.89 SEROMA OF BREAST: ICD-10-CM

## 2024-08-01 DIAGNOSIS — Z17.0 MALIGNANT NEOPLASM OF CENTRAL PORTION OF RIGHT BREAST IN FEMALE, ESTROGEN RECEPTOR POSITIVE (MULTI): Primary | ICD-10-CM

## 2024-08-01 DIAGNOSIS — C50.412 MALIGNANT NEOPLASM OF UPPER-OUTER QUADRANT OF LEFT BREAST IN FEMALE, ESTROGEN RECEPTOR NEGATIVE (MULTI): ICD-10-CM

## 2024-08-01 DIAGNOSIS — F41.8 SITUATIONAL ANXIETY: ICD-10-CM

## 2024-08-01 DIAGNOSIS — C50.111 MALIGNANT NEOPLASM OF CENTRAL PORTION OF RIGHT BREAST IN FEMALE, ESTROGEN RECEPTOR POSITIVE (MULTI): Primary | ICD-10-CM

## 2024-08-01 DIAGNOSIS — G89.18 POST-OP PAIN: ICD-10-CM

## 2024-08-01 DIAGNOSIS — Z17.1 MALIGNANT NEOPLASM OF UPPER-OUTER QUADRANT OF LEFT BREAST IN FEMALE, ESTROGEN RECEPTOR NEGATIVE (MULTI): ICD-10-CM

## 2024-08-01 PROCEDURE — 99024 POSTOP FOLLOW-UP VISIT: CPT | Performed by: SURGERY

## 2024-08-01 PROCEDURE — 3078F DIAST BP <80 MM HG: CPT | Performed by: SURGERY

## 2024-08-01 PROCEDURE — 1036F TOBACCO NON-USER: CPT | Performed by: SURGERY

## 2024-08-01 PROCEDURE — 3008F BODY MASS INDEX DOCD: CPT | Performed by: SURGERY

## 2024-08-01 PROCEDURE — 3077F SYST BP >= 140 MM HG: CPT | Performed by: SURGERY

## 2024-08-01 RX ORDER — CELECOXIB 200 MG/1
200 CAPSULE ORAL 2 TIMES DAILY
Qty: 28 CAPSULE | Refills: 0 | Status: SHIPPED | OUTPATIENT
Start: 2024-08-01 | End: 2024-08-15

## 2024-08-01 RX ORDER — GABAPENTIN 300 MG/1
300 CAPSULE ORAL EVERY 8 HOURS
Qty: 42 CAPSULE | Refills: 0 | Status: SHIPPED | OUTPATIENT
Start: 2024-08-01 | End: 2024-08-15

## 2024-08-01 RX ORDER — CHLORHEXIDINE GLUCONATE 40 MG/ML
SOLUTION TOPICAL ONCE
Qty: 118 ML | Refills: 0 | Status: SHIPPED | OUTPATIENT
Start: 2024-08-01 | End: 2024-08-01

## 2024-08-01 RX ORDER — GABAPENTIN 600 MG/1
600 TABLET ORAL ONCE
OUTPATIENT
Start: 2024-08-01 | End: 2024-08-01

## 2024-08-01 RX ORDER — ACETAMINOPHEN 500 MG
1000 TABLET ORAL EVERY 8 HOURS
Qty: 30 TABLET | Refills: 0 | Status: SHIPPED | OUTPATIENT
Start: 2024-08-01 | End: 2024-08-15

## 2024-08-01 RX ORDER — SCOLOPAMINE TRANSDERMAL SYSTEM 1 MG/1
1 PATCH, EXTENDED RELEASE TRANSDERMAL ONCE
OUTPATIENT
Start: 2024-08-01 | End: 2024-08-01

## 2024-08-02 RX ORDER — DIAZEPAM 5 MG/1
5 TABLET ORAL ONCE
Qty: 1 TABLET | Refills: 0 | Status: SHIPPED | OUTPATIENT
Start: 2024-08-02 | End: 2024-08-02

## 2024-08-03 ENCOUNTER — ANESTHESIA EVENT (OUTPATIENT)
Dept: OPERATING ROOM | Facility: HOSPITAL | Age: 59
End: 2024-08-03
Payer: COMMERCIAL

## 2024-08-03 NOTE — ANESTHESIA PREPROCEDURE EVALUATION
Patient: Scott Leung    Procedure Information       Date/Time: 08/05/24 1130    Procedure: Right Breast Capsulectomy (Right: Breast) - may I please follow myself after Willow's case in OR 5?  total time requested 2.5 hours    Location: [facility] OR 05 / Virtual [facility] OR    Surgeons: Danica Torres MD            Relevant Problems   Cardiac   (+) Benign essential hypertension   (+) Hyperlipidemia      Neuro   (+) Depression with anxiety   (+) Situational anxiety      Endocrine   (+) Hypothyroidism      Musculoskeletal   (+) Lumbar stenosis   (+) Primary localized osteoarthrosis of left lower leg   (+) Primary osteoarthritis of right knee      HEENT   (+) Mixed hearing loss, bilateral   (+) Sensorineural hearing loss (SNHL) of right ear with restricted hearing of left ear      GYN   (+) Malignant neoplasm of central portion of right breast in female, estrogen receptor positive (Multi)       Clinical information reviewed:   Tobacco  Allergies  Meds   Med Hx  Surg Hx  OB Status  Fam Hx  Soc   Hx         Past Medical History:   Diagnosis Date    Arthritis     Breast cancer (Multi) 03/2024    Right    Depression     Head injury 03/01/2023    HTN (hypertension)     Hyperlipidemia     Hypothyroidism     Migraines     Scoliosis 2021      Past Surgical History:   Procedure Laterality Date    INNER EAR SURGERY      KNEE ARTHROSCOPY W/ DEBRIDEMENT      MASTECTOMY W/ SENTINEL NODE BIOPSY Right 06/10/2024    with tissue expander    NASAL ENDOSCOPY  2018    OVARIAN CYST REMOVAL      TUBAL LIGATION      also had uterine ablation    ULNAR NERVE TRANSPOSITION      WISDOM TOOTH EXTRACTION  1985     Social History     Tobacco Use    Smoking status: Never    Smokeless tobacco: Never   Substance Use Topics    Alcohol use: Yes     Alcohol/week: 8.0 standard drinks of alcohol     Types: 2 Glasses of wine, 6 Cans of beer per week     Comment: Varies, social    Drug use: Never      Current Outpatient Medications   Medication Instructions  "   acetaminophen (TYLENOL EXTRA STRENGTH) 1,000 mg, oral, Every 8 hours    amLODIPine (NORVASC) 10 mg, oral, Daily    ASPIRIN LOW-STRENGTH ORAL 81 mg, oral, Every other day    celecoxib (CELEBREX) 200 mg, oral, 2 times daily    cholecalciferol (VITAMIN D-3) 25 mcg, oral, Daily    diazePAM (VALIUM) 5 mg, oral, Once    escitalopram (LEXAPRO) 5 mg, oral, Daily    exemestane (AROMASIN) 25 mg, oral, Daily, Take after a meal.  Try to take at the same time each day.    gabapentin (NEURONTIN) 300 mg, oral, Every 8 hours    hydrocortisone 2.5 % cream Hydrocortisone 2.5 % External Cream  Quantity: 56   Refills: 0  Start: 9-Aug-2021    levoFLOXacin (LEVAQUIN) 750 mg, oral, Daily    levothyroxine (SYNTHROID, LEVOXYL) 25 mcg, oral, Every morning    losartan (COZAAR) 100 mg, oral, Daily    propranolol (INDERAL) 10 mg, oral, 2 times daily    rizatriptan MLT (MAXALT-MLT) 10 mg, oral, Once as needed, May repeat once in 2 hours if needed (Maximum 2 tablets in 24 hours)    rosuvastatin (CRESTOR) 20 mg, oral, Daily      Allergies   Allergen Reactions    Tetanus Vaccines And Toxoid Other    Latex Rash        Chemistry    Lab Results   Component Value Date/Time     05/28/2024 1210    K 4.3 05/28/2024 1210     05/28/2024 1210    CO2 27 05/28/2024 1210    BUN 13 05/28/2024 1210    CREATININE 0.84 05/28/2024 1210    Lab Results   Component Value Date/Time    CALCIUM 9.4 05/28/2024 1210    ALKPHOS 80 05/28/2024 1210    AST 25 05/28/2024 1210    ALT 30 05/28/2024 1210    BILITOT 0.6 05/28/2024 1210          Lab Results   Component Value Date    HGBA1C 4.8 03/11/2022     Lab Results   Component Value Date/Time    WBC 6.4 05/28/2024 1210    HGB 13.3 05/28/2024 1210    HCT 39.8 05/28/2024 1210     05/28/2024 1210     No results found for: \"PROTIME\", \"PTT\", \"INR\"  No results found for: \"ABORH\"  No results found for this or any previous visit (from the past 4464 hour(s)).  No results found for this or any previous visit from the " "past 1095 days.       Visit Vitals  /73   Pulse 67   Temp 36.2 °C (97.2 °F) (Temporal)   Resp 16   Ht 1.702 m (5' 7\")   Wt 104 kg (230 lb)   SpO2 96%   BMI 36.02 kg/m²   OB Status Postmenopausal   Smoking Status Never   BSA 2.22 m²     NPO/Void Status  Carbohydrate Drink Given Prior to Surgery? : N  Date of Last Liquid: 08/05/24  Time of Last Liquid: 0530  Date of Last Solid: 08/04/24  Time of Last Solid: 2030  Last Intake Type: Clear fluids  Time of Last Void: 0722         Physical Exam    Airway  Mallampati: III  TM distance: >3 FB  Neck ROM: full     Cardiovascular   Rhythm: regular  Rate: normal     Dental - normal exam     Pulmonary   Breath sounds clear to auscultation     Abdominal - normal exam       Other findings: 6/2024: easy mask, mac 3, grade 2a, 1 attempt           Anesthesia Plan    History of general anesthesia?: yes  History of complications of general anesthesia?: no    ASA 3     general   (General with LMA)  intravenous induction   Postoperative administration of opioids is intended.  Trial extubation is planned.  Anesthetic plan and risks discussed with patient.    Plan discussed with CRNA and CAA.        "

## 2024-08-05 ENCOUNTER — ANESTHESIA (OUTPATIENT)
Dept: OPERATING ROOM | Facility: HOSPITAL | Age: 59
End: 2024-08-05
Payer: COMMERCIAL

## 2024-08-05 ENCOUNTER — HOSPITAL ENCOUNTER (OUTPATIENT)
Facility: HOSPITAL | Age: 59
Setting detail: OUTPATIENT SURGERY
Discharge: HOME | End: 2024-08-05
Attending: SURGERY | Admitting: SURGERY
Payer: COMMERCIAL

## 2024-08-05 ENCOUNTER — TELEPHONE (OUTPATIENT)
Dept: PLASTIC SURGERY | Facility: CLINIC | Age: 59
End: 2024-08-05

## 2024-08-05 VITALS
TEMPERATURE: 97.3 F | HEIGHT: 67 IN | DIASTOLIC BLOOD PRESSURE: 73 MMHG | BODY MASS INDEX: 36.1 KG/M2 | SYSTOLIC BLOOD PRESSURE: 127 MMHG | HEART RATE: 69 BPM | OXYGEN SATURATION: 95 % | WEIGHT: 230 LBS | RESPIRATION RATE: 16 BRPM

## 2024-08-05 DIAGNOSIS — Z17.0 MALIGNANT NEOPLASM OF CENTRAL PORTION OF RIGHT BREAST IN FEMALE, ESTROGEN RECEPTOR POSITIVE (MULTI): ICD-10-CM

## 2024-08-05 DIAGNOSIS — C50.111 MALIGNANT NEOPLASM OF CENTRAL PORTION OF RIGHT BREAST IN FEMALE, ESTROGEN RECEPTOR POSITIVE (MULTI): ICD-10-CM

## 2024-08-05 DIAGNOSIS — S21.001S OPEN WOUND OF BREAST WITH COMPLICATION, RIGHT, SEQUELA: ICD-10-CM

## 2024-08-05 DIAGNOSIS — N64.89 SEROMA OF BREAST: ICD-10-CM

## 2024-08-05 PROCEDURE — 3600000008 HC OR TIME - EACH INCREMENTAL 1 MINUTE - PROCEDURE LEVEL THREE: Performed by: SURGERY

## 2024-08-05 PROCEDURE — 88304 TISSUE EXAM BY PATHOLOGIST: CPT | Performed by: PATHOLOGY

## 2024-08-05 PROCEDURE — 3700000002 HC GENERAL ANESTHESIA TIME - EACH INCREMENTAL 1 MINUTE: Performed by: SURGERY

## 2024-08-05 PROCEDURE — 87102 FUNGUS ISOLATION CULTURE: CPT | Mod: AHULAB | Performed by: SURGERY

## 2024-08-05 PROCEDURE — A19371 PR REMOVAL OF BREAST CAPSULE: Performed by: ANESTHESIOLOGY

## 2024-08-05 PROCEDURE — 88300 SURGICAL PATH GROSS: CPT | Mod: TC,AHULAB | Performed by: SURGERY

## 2024-08-05 PROCEDURE — 88300 SURGICAL PATH GROSS: CPT | Performed by: PATHOLOGY

## 2024-08-05 PROCEDURE — A4649 SURGICAL SUPPLIES: HCPCS | Performed by: SURGERY

## 2024-08-05 PROCEDURE — 7100000002 HC RECOVERY ROOM TIME - EACH INCREMENTAL 1 MINUTE: Performed by: SURGERY

## 2024-08-05 PROCEDURE — 88342 IMHCHEM/IMCYTCHM 1ST ANTB: CPT | Performed by: PATHOLOGY

## 2024-08-05 PROCEDURE — A19371 PR REMOVAL OF BREAST CAPSULE: Performed by: ANESTHESIOLOGIST ASSISTANT

## 2024-08-05 PROCEDURE — 2500000004 HC RX 250 GENERAL PHARMACY W/ HCPCS (ALT 636 FOR OP/ED): Performed by: SURGERY

## 2024-08-05 PROCEDURE — 2500000005 HC RX 250 GENERAL PHARMACY W/O HCPCS: Performed by: SURGERY

## 2024-08-05 PROCEDURE — 2500000004 HC RX 250 GENERAL PHARMACY W/ HCPCS (ALT 636 FOR OP/ED): Performed by: ANESTHESIOLOGY

## 2024-08-05 PROCEDURE — 7100000010 HC PHASE TWO TIME - EACH INCREMENTAL 1 MINUTE: Performed by: SURGERY

## 2024-08-05 PROCEDURE — 11970 RPLCMT TISS XPNDR PERM IMPLT: CPT | Performed by: SURGERY

## 2024-08-05 PROCEDURE — 2500000004 HC RX 250 GENERAL PHARMACY W/ HCPCS (ALT 636 FOR OP/ED): Performed by: ANESTHESIOLOGIST ASSISTANT

## 2024-08-05 PROCEDURE — 2720000007 HC OR 272 NO HCPCS: Performed by: SURGERY

## 2024-08-05 PROCEDURE — 87116 MYCOBACTERIA CULTURE: CPT | Mod: AHULAB | Performed by: SURGERY

## 2024-08-05 PROCEDURE — 87070 CULTURE OTHR SPECIMN AEROBIC: CPT | Mod: AHULAB | Performed by: SURGERY

## 2024-08-05 PROCEDURE — 15860 IV NJX TST VASC FLO FLAP/GRF: CPT | Performed by: SURGERY

## 2024-08-05 PROCEDURE — C1713 ANCHOR/SCREW BN/BN,TIS/BN: HCPCS | Performed by: SURGERY

## 2024-08-05 PROCEDURE — 3600000003 HC OR TIME - INITIAL BASE CHARGE - PROCEDURE LEVEL THREE: Performed by: SURGERY

## 2024-08-05 PROCEDURE — 7100000009 HC PHASE TWO TIME - INITIAL BASE CHARGE: Performed by: SURGERY

## 2024-08-05 PROCEDURE — 3700000001 HC GENERAL ANESTHESIA TIME - INITIAL BASE CHARGE: Performed by: SURGERY

## 2024-08-05 PROCEDURE — 2500000001 HC RX 250 WO HCPCS SELF ADMINISTERED DRUGS (ALT 637 FOR MEDICARE OP): Performed by: SURGERY

## 2024-08-05 PROCEDURE — 2500000005 HC RX 250 GENERAL PHARMACY W/O HCPCS: Performed by: ANESTHESIOLOGIST ASSISTANT

## 2024-08-05 PROCEDURE — 7100000001 HC RECOVERY ROOM TIME - INITIAL BASE CHARGE: Performed by: SURGERY

## 2024-08-05 DEVICE — SMOOTH ROUND MODERATE CLASSIC PROFILE GEL-FILLED BREAST IMPLANT, 275CC  SMOOTH ROUND SILICONE
Type: IMPLANTABLE DEVICE | Site: BREAST | Status: FUNCTIONAL
Brand: MENTOR MEMORYGEL BREAST IMPLANT

## 2024-08-05 RX ORDER — LIDOCAINE HYDROCHLORIDE 20 MG/ML
INJECTION, SOLUTION INFILTRATION; PERINEURAL AS NEEDED
Status: DISCONTINUED | OUTPATIENT
Start: 2024-08-05 | End: 2024-08-05

## 2024-08-05 RX ORDER — ACETAMINOPHEN 650 MG/1
SUPPOSITORY RECTAL AS NEEDED
Status: DISCONTINUED | OUTPATIENT
Start: 2024-08-05 | End: 2024-08-05 | Stop reason: HOSPADM

## 2024-08-05 RX ORDER — ALBUTEROL SULFATE 0.83 MG/ML
2.5 SOLUTION RESPIRATORY (INHALATION) ONCE AS NEEDED
Status: DISCONTINUED | OUTPATIENT
Start: 2024-08-05 | End: 2024-08-05 | Stop reason: HOSPADM

## 2024-08-05 RX ORDER — PHENYLEPHRINE HCL IN 0.9% NACL 0.4MG/10ML
SYRINGE (ML) INTRAVENOUS AS NEEDED
Status: DISCONTINUED | OUTPATIENT
Start: 2024-08-05 | End: 2024-08-05

## 2024-08-05 RX ORDER — HYDROMORPHONE HYDROCHLORIDE 1 MG/ML
INJECTION, SOLUTION INTRAMUSCULAR; INTRAVENOUS; SUBCUTANEOUS AS NEEDED
Status: DISCONTINUED | OUTPATIENT
Start: 2024-08-05 | End: 2024-08-05

## 2024-08-05 RX ORDER — ROCURONIUM BROMIDE 10 MG/ML
INJECTION, SOLUTION INTRAVENOUS AS NEEDED
Status: DISCONTINUED | OUTPATIENT
Start: 2024-08-05 | End: 2024-08-05

## 2024-08-05 RX ORDER — OXYCODONE HYDROCHLORIDE 5 MG/1
5 TABLET ORAL EVERY 4 HOURS PRN
Status: DISCONTINUED | OUTPATIENT
Start: 2024-08-05 | End: 2024-08-05 | Stop reason: HOSPADM

## 2024-08-05 RX ORDER — SODIUM CHLORIDE, SODIUM LACTATE, POTASSIUM CHLORIDE, CALCIUM CHLORIDE 600; 310; 30; 20 MG/100ML; MG/100ML; MG/100ML; MG/100ML
INJECTION, SOLUTION INTRAVENOUS CONTINUOUS PRN
Status: DISCONTINUED | OUTPATIENT
Start: 2024-08-05 | End: 2024-08-05

## 2024-08-05 RX ORDER — ONDANSETRON HYDROCHLORIDE 2 MG/ML
INJECTION, SOLUTION INTRAVENOUS AS NEEDED
Status: DISCONTINUED | OUTPATIENT
Start: 2024-08-05 | End: 2024-08-05

## 2024-08-05 RX ORDER — NORETHINDRONE AND ETHINYL ESTRADIOL 0.5-0.035
KIT ORAL AS NEEDED
Status: DISCONTINUED | OUTPATIENT
Start: 2024-08-05 | End: 2024-08-05

## 2024-08-05 RX ORDER — SODIUM CHLORIDE 9 MG/ML
INJECTION, SOLUTION INTRAMUSCULAR; INTRAVENOUS; SUBCUTANEOUS AS NEEDED
Status: DISCONTINUED | OUTPATIENT
Start: 2024-08-05 | End: 2024-08-05 | Stop reason: HOSPADM

## 2024-08-05 RX ORDER — MIDAZOLAM HYDROCHLORIDE 1 MG/ML
INJECTION INTRAMUSCULAR; INTRAVENOUS AS NEEDED
Status: DISCONTINUED | OUTPATIENT
Start: 2024-08-05 | End: 2024-08-05

## 2024-08-05 RX ORDER — GABAPENTIN 300 MG/1
600 CAPSULE ORAL ONCE
Status: COMPLETED | OUTPATIENT
Start: 2024-08-05 | End: 2024-08-05

## 2024-08-05 RX ORDER — MUPIROCIN 20 MG/G
OINTMENT TOPICAL AS NEEDED
Status: DISCONTINUED | OUTPATIENT
Start: 2024-08-05 | End: 2024-08-05 | Stop reason: HOSPADM

## 2024-08-05 RX ORDER — FENTANYL CITRATE 50 UG/ML
INJECTION, SOLUTION INTRAMUSCULAR; INTRAVENOUS AS NEEDED
Status: DISCONTINUED | OUTPATIENT
Start: 2024-08-05 | End: 2024-08-05

## 2024-08-05 RX ORDER — KETOROLAC TROMETHAMINE 30 MG/ML
INJECTION, SOLUTION INTRAMUSCULAR; INTRAVENOUS AS NEEDED
Status: DISCONTINUED | OUTPATIENT
Start: 2024-08-05 | End: 2024-08-05

## 2024-08-05 RX ORDER — ONDANSETRON HYDROCHLORIDE 2 MG/ML
4 INJECTION, SOLUTION INTRAVENOUS ONCE AS NEEDED
Status: DISCONTINUED | OUTPATIENT
Start: 2024-08-05 | End: 2024-08-05 | Stop reason: HOSPADM

## 2024-08-05 RX ORDER — INDOCYANINE GREEN AND WATER 25 MG
KIT INJECTION AS NEEDED
Status: DISCONTINUED | OUTPATIENT
Start: 2024-08-05 | End: 2024-08-05

## 2024-08-05 RX ORDER — SCOLOPAMINE TRANSDERMAL SYSTEM 1 MG/1
1 PATCH, EXTENDED RELEASE TRANSDERMAL ONCE
Status: DISCONTINUED | OUTPATIENT
Start: 2024-08-05 | End: 2024-08-05 | Stop reason: HOSPADM

## 2024-08-05 RX ORDER — CEFAZOLIN 1 G/1
INJECTION, POWDER, FOR SOLUTION INTRAVENOUS AS NEEDED
Status: DISCONTINUED | OUTPATIENT
Start: 2024-08-05 | End: 2024-08-05

## 2024-08-05 RX ORDER — POVIDONE-IODINE 10 %
SOLUTION, NON-ORAL TOPICAL AS NEEDED
Status: DISCONTINUED | OUTPATIENT
Start: 2024-08-05 | End: 2024-08-05 | Stop reason: HOSPADM

## 2024-08-05 RX ORDER — PROPOFOL 10 MG/ML
INJECTION, EMULSION INTRAVENOUS AS NEEDED
Status: DISCONTINUED | OUTPATIENT
Start: 2024-08-05 | End: 2024-08-05

## 2024-08-05 RX ORDER — ACETAMINOPHEN 325 MG/1
650 TABLET ORAL EVERY 4 HOURS PRN
Status: DISCONTINUED | OUTPATIENT
Start: 2024-08-05 | End: 2024-08-05 | Stop reason: HOSPADM

## 2024-08-05 RX ORDER — SODIUM CHLORIDE, SODIUM LACTATE, POTASSIUM CHLORIDE, CALCIUM CHLORIDE 600; 310; 30; 20 MG/100ML; MG/100ML; MG/100ML; MG/100ML
100 INJECTION, SOLUTION INTRAVENOUS CONTINUOUS
Status: DISCONTINUED | OUTPATIENT
Start: 2024-08-05 | End: 2024-08-05 | Stop reason: HOSPADM

## 2024-08-05 RX ORDER — BUPIVACAINE HCL/EPINEPHRINE 0.5-1:200K
VIAL (ML) INJECTION AS NEEDED
Status: DISCONTINUED | OUTPATIENT
Start: 2024-08-05 | End: 2024-08-05 | Stop reason: HOSPADM

## 2024-08-05 RX ADMIN — SCOPALAMINE 1 PATCH: 1 PATCH, EXTENDED RELEASE TRANSDERMAL at 07:31

## 2024-08-05 RX ADMIN — GABAPENTIN 600 MG: 300 CAPSULE ORAL at 07:31

## 2024-08-05 RX ADMIN — SODIUM CHLORIDE, POTASSIUM CHLORIDE, SODIUM LACTATE AND CALCIUM CHLORIDE 500 ML: 600; 310; 30; 20 INJECTION, SOLUTION INTRAVENOUS at 11:52

## 2024-08-05 RX ADMIN — SODIUM CHLORIDE, POTASSIUM CHLORIDE, SODIUM LACTATE AND CALCIUM CHLORIDE 100 ML/HR: 600; 310; 30; 20 INJECTION, SOLUTION INTRAVENOUS at 11:30

## 2024-08-05 ASSESSMENT — PAIN - FUNCTIONAL ASSESSMENT
PAIN_FUNCTIONAL_ASSESSMENT: 0-10

## 2024-08-05 ASSESSMENT — PAIN SCALES - GENERAL
PAINLEVEL_OUTOF10: 0 - NO PAIN
PAINLEVEL_OUTOF10: 1
PAINLEVEL_OUTOF10: 0 - NO PAIN

## 2024-08-05 ASSESSMENT — COLUMBIA-SUICIDE SEVERITY RATING SCALE - C-SSRS
2. HAVE YOU ACTUALLY HAD ANY THOUGHTS OF KILLING YOURSELF?: NO
1. IN THE PAST MONTH, HAVE YOU WISHED YOU WERE DEAD OR WISHED YOU COULD GO TO SLEEP AND NOT WAKE UP?: NO
6. HAVE YOU EVER DONE ANYTHING, STARTED TO DO ANYTHING, OR PREPARED TO DO ANYTHING TO END YOUR LIFE?: NO

## 2024-08-05 ASSESSMENT — PAIN DESCRIPTION - DESCRIPTORS: DESCRIPTORS: DISCOMFORT

## 2024-08-05 NOTE — OP NOTE
Right Breast Capsulectomy; Replacement of Tissue Expander with Permanent Implant -Right (R) Operative Note     Date: 2024  OR Location: [facility] OR    Name: Scott Leung, : 1965, Age: 59 y.o., MRN: [id number], Sex: female    Diagnosis  Pre-op Diagnosis      * Seroma of breast [N64.89]     * Malignant neoplasm of central portion of right breast in female, estrogen receptor positive (Multi) [C50.111, Z17.0]     * Open wound of breast with complication, right, sequela [S21.001S] Post-op Diagnosis     * Seroma of breast [N64.89]     * Malignant neoplasm of central portion of right breast in female, estrogen receptor positive (Multi) [C50.111, Z17.0]     * Open wound of breast with complication, right, sequela [S21.001S]     Procedures  Right Breast Capsulectomy; Replacement of Tissue Expander with Permanent Implant -Right  18904 - AK SHANICE-IMPLANT CAPSULECTOMY BREAST COMPLETE    Right Breast Capsulectomy; Replacement of Tissue Expander with Permanent Implant -Right  25998 - [place] REPLACEMENT TISSUE EXPANDER W/PERMANENT IMPLANT      Surgeons      * Danica Torres - Primary    Resident/Fellow/Other Assistant:  Surgeons and Role:  * No surgeons found with a matching role *  Sravani Pittman MD Gen Surg PGY 2    Procedure Summary  Anesthesia: General  ASA: III  Anesthesia Staff: Anesthesiologist: Jhonatan Florence MD  C-AA: DIRK Adkins  Estimated Blood Loss: 25 mL  Intra-op Medications:   Administrations occurring from 0800 to 1100 on 24:   Medication Name Total Dose   BUPivacaine-EPINEPHrine (Marcaine w/EPI) 0.5 %-1:200,000 injection 50 mL   sodium chloride (PF) 0.9% solution 30 mL   povidone-iodine (Betadine) 10 % topical solution 1 Application   acetaminophen (Tylenol) suppository 975 mg              Anesthesia Record               Intraprocedure I/O Totals          Intake    lactated Ringer's 1000.00 mL    Total Intake 1000 mL       Output    Est. Blood Loss 25 mL    Total Output 25 mL       Net    Net Volume  975 mL          Specimen:   ID Type Source Tests Collected by Time   1 : RIGHT MASTECTOMY SCAR Tissue BREAST SCAR RIGHT SURGICAL PATHOLOGY EXAM Danica Torres MD 8/5/2024 0858   2 : RIGHT BREAST POSTERIOR CAPSULECTOMY Tissue BREAST CAPSULE RIGHT SURGICAL PATHOLOGY EXAM Danica Torres MD 8/5/2024 0918   3 : RIGHT BREAST EXPANDER Tissue BREAST IMPLANT RIGHT SURGICAL PATHOLOGY EXAM Danica Torres MD 8/5/2024 0935   A : RIGHT BREAST POSTERIOR CAPSULE Tissue BREAST CAPSULE RIGHT AFB CULTURE/SMEAR, FUNGAL CULTURE/SMEAR, TISSUE/WOUND CULTURE/SMEAR Danica Torres MD 8/5/2024 0916        Staff:   Circulator: Bouchra Guzman Person: Nikki Walton Circulator: Tammy Walton Scrub: Gerri         Drains and/or Catheters:   Closed/Suction Drain 1 Inferior;Lateral;Right Chest Bulb 19 Fr. (Active)   Dressing Status Dry;Clean 08/05/24 1130   Drainage Appearance Serosanguineous 08/05/24 1130   Status To bulb suction 08/05/24 1130   Output (mL) 10 mL 08/05/24 1345       Closed/Suction Drain 2 Inferior;Right Chest Bulb 19 Fr. (Active)   Dressing Status Clean;Dry 08/05/24 1130   Drainage Appearance Serosanguineous 08/05/24 1130   Status To bulb suction 08/05/24 1130   Output (mL) 20 mL 08/05/24 1345       Tourniquet Times:         Implants:  Implants       Type Name Action Serial No.       MEMORY GEL BREAST IMPLANT  275 ml Moderate Classic Implanted [id number]              Indications: Scott Leung is an 59 y.o. female who is having surgery for Seroma of breast [N64.89]  Malignant neoplasm of central portion of right breast in female, estrogen receptor positive (Multi) [C50.111, Z17.0]  Open wound of breast with complication, right, sequela [S21.001S].  She has a history of right breast cancer status post mastectomy and immediate prepectoral reconstruction with a tissue expander and mesh.  Her postoperative course was notable for a thin mastectomy skin flaps and a recurrent seroma despite biweekly aspirations.  Attempts to control the  seroma included rapidly filling the tissue expander with the volume of the seroma aspirated to fill her pocket.  Her skin envelope did not tolerate this.  Her first aspiration culture showed 1 colony-forming unit of Enterobacter cloacae.  This  was susceptible to Levaquin.  She has been short treated with oral Levaquin for approximately 2 weeks.  Subsequent aspirations did not t grow out any new cultures.  However the incision dehisced in 2 areas.  I felt that she was at high risk for developing a another infection.  Her seroma was not resolving and I was concerned for biofilm.      She was brought to the operating room for removal of her tissue expander excision of her seroma capsule and possible reconstruction with direct implant.The risks of the procedure were discussed with her prior to surgery.  These include but are not limited to the risks of anesthesia, infection, bleeding, pain, need for further procedures, hematoma, seroma, wound healing complications, and asymmetry.      The patient was seen in the preoperative area. The risks, benefits, complications, treatment options, non-operative alternatives, expected recovery and outcomes were discussed with the patient. The possibilities of reaction to medication, pulmonary aspiration, injury to surrounding structures, bleeding, recurrent infection, the need for additional procedures, failure to diagnose a condition, and creating a complication requiring transfusion or operation were discussed with the patient. The patient concurred with the proposed plan, giving informed consent.  The site of surgery was properly noted/marked if necessary per policy. The patient has been actively warmed in preoperative area. Preoperative antibiotics have been ordered and given within 1 hours of incision. Venous thrombosis prophylaxis have been ordered including bilateral sequential compression devices    Procedure Details: The patient was identified and marked in the upright and  standing position.  She was taken to the operative room and placed in the supine position.  A preoperative time was performed identifying the patient, procedeure and laterality.  An atraumatic endotracheal intubation was performed by the anesthesia team.  Her arms were padded and carefully secured to the arm boards, abducted less than 90 degrees. She was prepped and draped in the usual sterile fashion.    I marked her old mastectomy scar which was vertically oriented in the central inferior pole.  I included the 2 open wounds and sharply excised it with a 10 blade this was sent to pathology for permanent specimen.  Next I used a combination of tenotomy scissors dissection and finger dissection to completely dissect the mesh and surrounding capsule off the mastectomy skin flap I then punctured her tissue expander evacuated of saline and removed it this was sent for gross.  I noted some gelatinous type material on her posterior capsule which I debrided and sent for culture.  I was concern for biofilm.  I then use electrocautery and completely removed the posterior capsule off the pectoralis.  Hemostasis was obtained using electrocautery.  The wound was copiously washed with 3 L of dilute normal saline and hydrogen peroxide.  It was then washed with 2 L of normal saline.  And it was then irrigated with 1-1/2 bottles of Irrisept.  I then used SPY and indocyanine green to assess perfusion.  There was a 1.5 cm x 3 cm area of delayed perfusion just lateral to the superior portion of the incision which correlated to an area of the mastectomy skin that had been violaceous in color with venous telangiectasias prior to removal of the tissue expander and was thinner in this area.  I  marked and examined this area and found that the tissue was thinner here although they were blood vessels in the subdermal plexus that were patent.  I then assessed small volume implants with low projection to see whether or not this would change  her perfusion.  The 300 cc Clinton moderate classic gel sizer continue to have the same area of delayed perfusion.  It appeared that approximately 40 seconds was required for this area to become perfused.  I then assessed a 275 cc Clinton moderate plastic gel sizer and the perfusion to this area was still delayed but not significantly more so than the skin flaps alone.  I assessed the perfusion with the sizers and the patient sitting upright for the effects of gravity on the chest wall.  I considered placing new mesh to secure the implant to the chest wall and offload the weight of the implant on the mastectomy skin flaps.  However I was concerned that this might be a nidus for new infection.  I considered not putting any implant in however there was a significant chest wall deformity without the implant.  Because the 275 cc moderate plastic gel sizer did not add significant amount of delayed perfusion to the baseline delayed area and the rest of the flaps appeared perfused I decided to place an implant and arrange for her to get hyperbaric oxygen therapy to address this area of ischemia. I removed the sizer on the right  side.  Once more I ensured hemostasis.  I washed the pocket with a bottle of Irrisept. Two 19 Salvadorean Castillo's were placed. 50 cc of 0.5% Marcaine with epinephrine was diluted with 30 cc of normal saline and was used to place a right chest wall and drain block for post operative pain control.    I then exchanged gloves and prepped the right side with Betadine paint and sterile blue towels.  I opened up a 275 cc Clinton smooth round moderate classic profile implant and bathed it with Irrisept.  I used a Lucia funnel to place it into the pocket on the right side.  I then closed the incision with 3-0 Monocryl in the parenchyma as well as placed as interrupted buried dermals followed by interrupted 5-0 Prolene.  Nitro paste was placed on the area of delayed perfusion and ischemia and mupirocin was placed  over her incision.  The surgical site was dressed with a Prevena Pat incisional VAC dressing and a CHG Tegaderm dressing was placed over her drains.  The patient tolerated the procedure well and was extubated taken to the recovery room in good condition.           Complications:  None; patient tolerated the procedure well.    Disposition: PACU - hemodynamically stable.  Condition: stable       Attending Attestation: I performed the procedure.    Danica Torres  Phone Number: [phone]

## 2024-08-05 NOTE — DISCHARGE INSTRUCTIONS
Plastic and Reconstructive Surgery Post Operative Instructions  You had surgery on 8/5/2024 at [facility] for right capsulectomy and right breast implant insertion with Dr. Torres of plastic and reconstructive surgery. Included below are post-operative instructions and details regarding follow-up.     Thank you for allowing us to participate in your care and we wish you the best!    Best Regards,  [facility]  Department of Plastic and Reconstructive Surgery    Activity:  Activity as tolerated. Start walking short distances as soon as possible, as this helps to reduce swelling and lowers the chance of blood clots.     You may not shower until your wound vac is removed. Avoid soaking or submerging surgical incisions/sites or wetting wound vac dressing or device.      Surgical Site/Wound Care:  Please keep your Wound Vac/Prevena on suction at all times.  You will be sent home with instructions on the Prevena.   Maintain your both your right drains and monitor output (amount and characteristic).     Prevena/Wound Vac  You are being discharge with a Prevena incisional wound vac in place over a closed surgical incision to your abdomen. Please allow Prevena wound vac dressing to remain in place until follow-up with plastic surgery. Do not allow the wound vac dressing or device to become wet. When resting, please make sure to plug in the device with the supplied power cord to maintain the battery. The device has a power button at the center which is encircled by green lights. There will be one less light illuminated each day (every 24 hours) which is to be expected, and signifies the count down of the duration of the vac device. If you experience any issues with your wound vac including alarms, malfunction or dressing issues please refer to the provided instruction manual or contact the plastic surgery office at [phone].      Avoid application of creams, lotions  or ointments to surgical site, no soaking or scrubbing of surgical sites.     Please do not apply ice or heat directly to the skin as feeling to the area may be diminished.    Please notify our office immediately if developing signs of infection which include increased redness, swelling, fever/chills, green/yellow drainage, or foul odor from wound. Plastic Surgery office line: [phone].      3 weeks after surgery you may begin to massage your incisions with body lotion, BioOil, or scar cream. Do not use 100% vitamin E as it can cause skin irritation.    Avoid exposing scars to sun for at least 12 months. Always use a strong sunblock if sun exposure is unavoidable (SPF 30 or greater).    Drain care:  You are being discharged with 2 ELIAS drains. Please empty and record the output every 8 hours or as needed. To empty the drain, open the cap, tip into cup and squeeze to empty. Squeeze drain flat then replace the cap.  Please empty the drain and record its output 3 times a day and bring these numbers to your follow up appointment.  The drain output should decrease and the color of the drainage should become lighter (red to pink to yellow).  This drain is sutured into place.  Keep the area around the drain clean and dry.  You may use mild soap and water to cleanse around the drain.  Call the office if you notice drastic changes in drain output, bloody drain output, or redness/drainage around insertion site.    Nutrition:  You may resume a regular diet following surgery. Ensure that you are drinking an adequate amount of fluids to maintain hydration as well as consuming a diet high in protein and low in sugar. You may consider increasing your fiber intake to avoid constipation.    Do not smoke, as smoking delays healing and increases the risk of complications. Also be sure you are not around people that smoke for at least 6 weeks after surgery.  Second hand smoke is just as harmful as if you were to smoke.    Medication  Instructions:  You may resume use of your home prescribed mediations as previously directed following discharge from the hospital. If you were taking medications prior to your surgery and they are not listed on your discharge homegoing instructions medications list, consult your MD before you resume these medications.    Some postoperative pain is not unusual. This is usually relieved by taking prescribed or over the counter Acetaminophen/Tylenol, Motrin/ibuprofen. In cases of severe pain, you may use prescribed oxycodone as directed. Severe pain despite administration of pain medication must be reported to your physician.    Remember when taking Acetaminophen, do NOT exceed more than 1000 milligrams (mg) per dose or more than 4000 mg total per day. Taking too much Acetaminophen at one time can damage your liver. The maximum amount of ibuprofen in adults is 800 mg per dose or 3200 mg per day. Call your MD if you have any questions about your medications. To prevent constipation while taking narcotic pain medications, please utilize your prescribed bowel regimen, ensure that you drink plenty of water, eat fiber rich foods (a good source is fruit) and increase activity progressively.    DO NOT drive a car while utilizing narcotic pain medications and until cleared by MD at follow-up appointment. Driving or operating heavy machinery, lawnmowers or power tools while taking opiod/narcotic pain medications may impair your judgement.    Call Physician If:  Call your MD or seek immediate medical attention if you experience any of the following symptoms:  1. Fever of 101.5 (38.5 C) or greater  2. Pain not controlled with prescribed pain medications  3. Uncontrolled nausea and/or vomiting  4. Drainage or swelling around your incisions and/or surgical sites   5. Separation of incisions, or tearing of the incision line  6. Large fluid collection under or around the incision or flap sites   7. Skin discoloration (including  darkened appearance)  8. Difficulty breathing  9. Swelling, pain, heat and/or redness in your legs and/or calves  10. Inability to tolerate diet/fluid intake    Contact the plastic surgery office for any questions and/or concerns regarding the surgical incision/site.  1. [phone] if Monday-Friday (8 a.m. - 4:30 p.m.)  2. [phone] and ask for the Plastic Surgeon oncall if after hours or on weekends  3. Please send a picture with any wound concerns to our plastic surgery email at [email or web address]    Follow-up/Post Discharge Appointments:  Follow-up care is a key part of your treatment and safety. It is very important that you maintain follow-up care as directed so that your surgical site heals properly and does not lead to problems. Always carry a current medication list with you and bring it to ALL healthcare Provider visits. Be sure to maintain follow up with plastic surgery at your scheduled appointment. If you are unable to keep your appointment, or need to reschedule please contact our office at [phone]. Your follow up appointment is scheduled with Dr. Torres

## 2024-08-05 NOTE — ANESTHESIA PROCEDURE NOTES
Airway  Date/Time: 8/5/2024 8:26 AM  Urgency: elective    Airway not difficult    Staffing  Performed: attending   Authorized by: Jhonatan Florence MD    Performed by: DIRK Adkins  Patient location during procedure: OR    Indications and Patient Condition  Indications for airway management: anesthesia  Spontaneous ventilation: present  Sedation level: deep  Preoxygenated: yes  Patient position: sniffing  Mask difficulty assessment: 1 - vent by mask    Final Airway Details  Final airway type: endotracheal airway      Successful airway: ETT  Cuffed: yes   Successful intubation technique: direct laryngoscopy  Blade: Irina  Blade size: #3  ETT size (mm): 7.0  Cormack-Lehane Classification: grade I - full view of glottis  Inital cuff pressure (cm H2O): 23  Measured from: gums  Number of attempts at approach: 1  Ventilation between attempts: BVM

## 2024-08-06 LAB
FUNGUS SPEC CULT: NORMAL
FUNGUS SPEC FUNGUS STN: NORMAL

## 2024-08-06 NOTE — ANESTHESIA POSTPROCEDURE EVALUATION
Patient: Scott Leung    Procedure Summary       Date: 08/05/24 Room / Location: [facility] A OR 05 / Virtual [facility] OR    Anesthesia Start: 0817 Anesthesia Stop: 1135    Procedure: Right Breast Capsulectomy; Replacement of Tissue Expander with Permanent Implant -Right (Right: Breast) Diagnosis:       Seroma of breast      Malignant neoplasm of central portion of right breast in female, estrogen receptor positive (Multi)      Open wound of breast with complication, right, sequela      (Seroma of breast [N64.89])      (Malignant neoplasm of central portion of right breast in female, estrogen receptor positive (Multi) [C50.111, Z17.0])      (Open wound of breast with complication, right, sequela [S21.001S])    Surgeons: Danica Torres MD Responsible Provider: Jhonatan Florence MD    Anesthesia Type: general ASA Status: 3            Anesthesia Type: general    Vitals Value Taken Time   /67 08/05/24 1334   Temp 36.2 °C (97.2 °F) 08/05/24 1330   Pulse 73 08/05/24 1339   Resp 16 08/05/24 1330   SpO2 92 % 08/05/24 1339   Vitals shown include unfiled device data.    Anesthesia Post Evaluation    Patient location during evaluation: PACU  Patient participation: complete - patient participated  Level of consciousness: awake and alert  Pain management: adequate  Airway patency: patent  Cardiovascular status: acceptable and hemodynamically stable  Respiratory status: acceptable, spontaneous ventilation and nonlabored ventilation  Hydration status: acceptable  Postoperative Nausea and Vomiting: none        There were no known notable events for this encounter.

## 2024-08-07 LAB
ACID FAST STN SPEC: NORMAL
BACTERIA SPEC CULT: NORMAL
GRAM STN SPEC: NORMAL
GRAM STN SPEC: NORMAL
MYCOBACTERIUM SPEC CULT: NORMAL

## 2024-08-09 LAB
FUNGUS SPEC CULT: NORMAL
FUNGUS SPEC FUNGUS STN: NORMAL

## 2024-08-12 LAB
FUNGUS SPEC CULT: NORMAL
FUNGUS SPEC FUNGUS STN: NORMAL

## 2024-08-13 ENCOUNTER — APPOINTMENT (OUTPATIENT)
Dept: PLASTIC SURGERY | Facility: CLINIC | Age: 59
End: 2024-08-13
Payer: COMMERCIAL

## 2024-08-13 VITALS
DIASTOLIC BLOOD PRESSURE: 88 MMHG | HEART RATE: 83 BPM | HEIGHT: 67 IN | SYSTOLIC BLOOD PRESSURE: 144 MMHG | WEIGHT: 230 LBS | BODY MASS INDEX: 36.1 KG/M2

## 2024-08-13 DIAGNOSIS — Z17.0 MALIGNANT NEOPLASM OF CENTRAL PORTION OF RIGHT BREAST IN FEMALE, ESTROGEN RECEPTOR POSITIVE (MULTI): ICD-10-CM

## 2024-08-13 DIAGNOSIS — S21.001S OPEN WOUND OF BREAST WITH COMPLICATION, RIGHT, SEQUELA: ICD-10-CM

## 2024-08-13 DIAGNOSIS — C50.111 MALIGNANT NEOPLASM OF CENTRAL PORTION OF RIGHT BREAST IN FEMALE, ESTROGEN RECEPTOR POSITIVE (MULTI): ICD-10-CM

## 2024-08-13 LAB
LAB AP ASR DISCLAIMER: NORMAL
LAB AP ASR DISCLAIMER: NORMAL
LABORATORY COMMENT REPORT: NORMAL
LABORATORY COMMENT REPORT: NORMAL
PATH REPORT.FINAL DX SPEC: NORMAL
PATH REPORT.FINAL DX SPEC: NORMAL
PATH REPORT.GROSS SPEC: NORMAL
PATH REPORT.GROSS SPEC: NORMAL
PATH REPORT.RELEVANT HX SPEC: NORMAL
PATH REPORT.RELEVANT HX SPEC: NORMAL
PATH REPORT.TOTAL CANCER: NORMAL
PATH REPORT.TOTAL CANCER: NORMAL

## 2024-08-13 NOTE — PROGRESS NOTES
PLASTIC SURGERY CLINIC VISIT  POSTOP BREAST RECONSTRUCTION     Date: 08/13/24   Date of Surgery: 8/5/24  Surgical Procedure: right breast capsulectomy; replacement of tissue expander with permanent implant        HPI:   Scott Leung 59 y.o. female is here for post-operative appointment for the above procedure(s).      Interval changes as of this date:   8/13 She is recovering well overall. Expected postoperative pain. Following activity restrictions and endorses adequate protein intake.     MEDICATIONS    Current Outpatient Medications:     acetaminophen (Tylenol Extra Strength) 500 mg tablet, Take 2 tablets (1,000 mg) by mouth every 8 hours for 14 days., Disp: 30 tablet, Rfl: 0    amLODIPine (Norvasc) 10 mg tablet, TAKE 1 TABLET BY MOUTH EVERY DAY, Disp: 90 tablet, Rfl: 1    ASPIRIN LOW-STRENGTH ORAL, Take 81 mg by mouth every other day., Disp: , Rfl:     celecoxib (CeleBREX) 200 mg capsule, Take 1 capsule (200 mg) by mouth 2 times a day for 14 days., Disp: 28 capsule, Rfl: 0    cholecalciferol (Vitamin D-3) 25 MCG (1000 UT) capsule, Take 1 capsule (25 mcg) by mouth once daily., Disp: , Rfl:     diazePAM (Valium) 5 mg tablet, Take 1 tablet (5 mg) by mouth 1 time for 1 dose., Disp: 1 tablet, Rfl: 0    escitalopram (Lexapro) 10 mg tablet, Take 0.5 tablets (5 mg) by mouth once daily., Disp: 45 tablet, Rfl: 3    exemestane (Aromasin) 25 mg tablet, Take 1 tablet (25 mg total) by mouth once daily.  Take after a meal.  Try to take at the same time each day. (Patient not taking: Reported on 8/5/2024), Disp: 90 tablet, Rfl: 1    gabapentin (Neurontin) 300 mg capsule, Take 1 capsule (300 mg) by mouth every 8 hours for 14 days., Disp: 42 capsule, Rfl: 0    hydrocortisone 2.5 % cream, Hydrocortisone 2.5 % External Cream Quantity: 56  Refills: 0  Start: 9-Aug-2021, Disp: , Rfl:     levoFLOXacin (Levaquin) 750 mg tablet, Take 1 tablet (750 mg) by mouth once daily for 21 days., Disp: 21 tablet, Rfl: 0    levothyroxine  "(Synthroid, Levoxyl) 25 mcg tablet, TAKE 1 TABLET (25 MCG) BY MOUTH ONCE DAILY IN THE MORNING., Disp: 90 tablet, Rfl: 1    losartan (Cozaar) 100 mg tablet, TAKE 1 TABLET BY MOUTH EVERY DAY, Disp: 90 tablet, Rfl: 0    propranolol (Inderal) 10 mg tablet, TAKE 1 TABLET BY MOUTH TWICE A DAY, Disp: 180 tablet, Rfl: 1    rizatriptan MLT (Maxalt-MLT) 10 mg disintegrating tablet, Take 1 tablet (10 mg) by mouth 1 time if needed for migraine. May repeat once in 2 hours if needed (Maximum 2 tablets in 24 hours), Disp: 9 tablet, Rfl: 1    rosuvastatin (Crestor) 20 mg tablet, TAKE 1 TABLET BY MOUTH EVERY DAY, Disp: 90 tablet, Rfl: 1      OBJECTIVE [x]Expand by Default  Blood pressure 144/88, pulse 83, height 1.702 m (5' 7\"), weight 104 kg (230 lb).     REVIEW OF SYSTEMS:    Constitutional: negative for fevers, chills, unintentional weight loss  HEENT: negative for changes in vision, headaches, changes in hearing, congestion, sore throat  Cardiovascular: negative for chest pain, palpitations  Respiratory: negative for cough, shortness of breath  Gastrointestinal: negative for nausea, vomiting, diarrhea  Genitourinary: negative for dysuria, hematuria  Musculoskeletal: negative for joint swelling or pain  Skin: negative for rashes or lesions  Psych: negative for depression, anxiety  Endocrine: negative for polyuria, polydipsia, cold/heat intolerance  Hem/Lymph: negative for bleeding disorder     PHYSICAL EXAM  General: alert and oriented, no apparent distress    Focused exam of the breasts:  Right: incision C/D/I. Area with concerns of perfusion, some hyperemia but  no darkness to the skin.  Improved from OR     ASSESSMENT/PLAN  Scottlexi Rizzoy 59 y.o. female who had right breast capsulectomy; replacement of tissue expander with permanent implant on 8/5/24 who presents for POV.     ELIAS drain(s) in place. No erythema or edema surrounding the drain site. There is serous output from the drain. Patient recorded output of the drains showed " 2 consecutive days of less than 30cc output at time of removal. Patient was educated on purpose of surgical drains and informed of risk for seroma post drain removal.       ELIAS drain(s) removed at this visit: 1, will keep second drain.     Continue activity restrictions, no walking still.   Continue adequate protein intake.   Steri-strips placed. Keep dry with hair dryer on cool setting.   Can shower now.      RTC in 1 week for second drain removal.     Scribe Attestation  By signing my name below, Jessica MORFIN Scribe   attest that this documentation has been prepared under the direction and in the presence of Danica Torres MD. Obtained verbal consent for scribe from patient.     Attending Attestation:  Dancia MORFIN MD, personal performed the history, exam, and decision making on this patient.

## 2024-08-14 LAB
ACID FAST STN SPEC: NORMAL
MYCOBACTERIUM SPEC CULT: NORMAL

## 2024-08-17 DIAGNOSIS — I10 BENIGN ESSENTIAL HYPERTENSION: ICD-10-CM

## 2024-08-17 RX ORDER — LOSARTAN POTASSIUM 100 MG/1
100 TABLET ORAL DAILY
Qty: 90 TABLET | Refills: 1 | Status: SHIPPED | OUTPATIENT
Start: 2024-08-17

## 2024-08-19 LAB
FUNGUS SPEC CULT: NORMAL
FUNGUS SPEC FUNGUS STN: NORMAL

## 2024-08-21 LAB
ACID FAST STN SPEC: NORMAL
MYCOBACTERIUM SPEC CULT: NORMAL

## 2024-08-21 NOTE — PROGRESS NOTES
PLASTIC SURGERY CLINIC VISIT  POSTOP BREAST RECONSTRUCTION     Date: 8/22/24  Date of Surgery: 8/5/24  Surgical Procedure: right breast capsulectomy; replacement of tissue expander with permanent implant        HPI:   Scott Leung 59 y.o. female is here for post-operative appointment for the above procedure(s).      Interval changes as of this date:   8/13 She is recovering well overall. Expected postoperative pain. Following activity restrictions and endorses adequate protein intake.  8/22 Doing well overall. Here for evaluation of ELIAS drain. Output <30 ml per day for three consecutive days. She is willing to leave drains in until 8/27.   MEDICATIONS    Current Outpatient Medications:     amLODIPine (Norvasc) 10 mg tablet, TAKE 1 TABLET BY MOUTH EVERY DAY, Disp: 90 tablet, Rfl: 1    ASPIRIN LOW-STRENGTH ORAL, Take 81 mg by mouth every other day., Disp: , Rfl:     cholecalciferol (Vitamin D-3) 25 MCG (1000 UT) capsule, Take 1 capsule (25 mcg) by mouth once daily., Disp: , Rfl:     diazePAM (Valium) 5 mg tablet, Take 1 tablet (5 mg) by mouth 1 time for 1 dose., Disp: 1 tablet, Rfl: 0    escitalopram (Lexapro) 10 mg tablet, Take 0.5 tablets (5 mg) by mouth once daily., Disp: 45 tablet, Rfl: 3    exemestane (Aromasin) 25 mg tablet, Take 1 tablet (25 mg total) by mouth once daily.  Take after a meal.  Try to take at the same time each day. (Patient not taking: Reported on 8/5/2024), Disp: 90 tablet, Rfl: 1    gabapentin (Neurontin) 300 mg capsule, Take 1 capsule (300 mg) by mouth every 8 hours for 14 days., Disp: 42 capsule, Rfl: 0    hydrocortisone 2.5 % cream, Hydrocortisone 2.5 % External Cream Quantity: 56  Refills: 0  Start: 9-Aug-2021, Disp: , Rfl:     levothyroxine (Synthroid, Levoxyl) 25 mcg tablet, TAKE 1 TABLET (25 MCG) BY MOUTH ONCE DAILY IN THE MORNING., Disp: 90 tablet, Rfl: 1    losartan (Cozaar) 100 mg tablet, Take 1 tablet (100 mg) by mouth once daily., Disp: 90 tablet, Rfl: 1    propranolol  "(Inderal) 10 mg tablet, TAKE 1 TABLET BY MOUTH TWICE A DAY, Disp: 180 tablet, Rfl: 1    rizatriptan MLT (Maxalt-MLT) 10 mg disintegrating tablet, Take 1 tablet (10 mg) by mouth 1 time if needed for migraine. May repeat once in 2 hours if needed (Maximum 2 tablets in 24 hours), Disp: 9 tablet, Rfl: 1    rosuvastatin (Crestor) 20 mg tablet, TAKE 1 TABLET BY MOUTH EVERY DAY, Disp: 90 tablet, Rfl: 1      OBJECTIVE [x]Expand by Default  Blood pressure 136/90, pulse 68, height 1.702 m (5' 7\"), weight 104 kg (230 lb).      REVIEW OF SYSTEMS:    Constitutional: negative for fevers, chills, unintentional weight loss  HEENT: negative for changes in vision, headaches, changes in hearing, congestion, sore throat  Cardiovascular: negative for chest pain, palpitations  Respiratory: negative for cough, shortness of breath  Gastrointestinal: negative for nausea, vomiting, diarrhea  Genitourinary: negative for dysuria, hematuria  Musculoskeletal: negative for joint swelling or pain  Skin: negative for rashes or lesions  Psych: negative for depression, anxiety  Endocrine: negative for polyuria, polydipsia, cold/heat intolerance  Hem/Lymph: negative for bleeding disorder     PHYSICAL EXAM  General: alert and oriented, no apparent distress    Focused exam of the breasts:  Right: incision C/D/I. Skin well perfused       ASSESSMENT/PLAN  Scott Leung 59 y.o. female who had right breast capsulectomy; replacement of tissue expander with permanent implant on 8/5/24 who presents for POV.     ELIAS drain <30 ml but will remain in place until visit 8/27/24.      Continue activity restrictions, no walking still.   Continue adequate protein intake.   CHD dressing applied to ELIAS drain site   Steri-strips placed. Keep dry with hair dryer on cool setting.      RTC 8/27/24  "

## 2024-08-22 ENCOUNTER — OFFICE VISIT (OUTPATIENT)
Dept: PLASTIC SURGERY | Facility: CLINIC | Age: 59
End: 2024-08-22
Payer: COMMERCIAL

## 2024-08-22 VITALS
SYSTOLIC BLOOD PRESSURE: 136 MMHG | WEIGHT: 230 LBS | DIASTOLIC BLOOD PRESSURE: 90 MMHG | HEART RATE: 68 BPM | BODY MASS INDEX: 36.1 KG/M2 | HEIGHT: 67 IN

## 2024-08-22 DIAGNOSIS — N64.89 SEROMA OF BREAST: Primary | ICD-10-CM

## 2024-08-22 PROCEDURE — 99024 POSTOP FOLLOW-UP VISIT: CPT | Performed by: PHYSICIAN ASSISTANT

## 2024-08-22 PROCEDURE — 3080F DIAST BP >= 90 MM HG: CPT | Performed by: PHYSICIAN ASSISTANT

## 2024-08-22 PROCEDURE — 3075F SYST BP GE 130 - 139MM HG: CPT | Performed by: PHYSICIAN ASSISTANT

## 2024-08-22 PROCEDURE — 3008F BODY MASS INDEX DOCD: CPT | Performed by: PHYSICIAN ASSISTANT

## 2024-08-26 LAB
FUNGUS SPEC CULT: NORMAL
FUNGUS SPEC FUNGUS STN: NORMAL

## 2024-08-27 ENCOUNTER — APPOINTMENT (OUTPATIENT)
Dept: PLASTIC SURGERY | Facility: CLINIC | Age: 59
End: 2024-08-27
Payer: COMMERCIAL

## 2024-08-27 VITALS
DIASTOLIC BLOOD PRESSURE: 86 MMHG | SYSTOLIC BLOOD PRESSURE: 139 MMHG | HEIGHT: 67 IN | BODY MASS INDEX: 36.1 KG/M2 | HEART RATE: 66 BPM | WEIGHT: 230 LBS

## 2024-08-27 DIAGNOSIS — C50.111 MALIGNANT NEOPLASM OF CENTRAL PORTION OF RIGHT BREAST IN FEMALE, ESTROGEN RECEPTOR POSITIVE (MULTI): ICD-10-CM

## 2024-08-27 DIAGNOSIS — S21.001S OPEN WOUND OF BREAST WITH COMPLICATION, RIGHT, SEQUELA: Primary | ICD-10-CM

## 2024-08-27 DIAGNOSIS — Z17.0 MALIGNANT NEOPLASM OF CENTRAL PORTION OF RIGHT BREAST IN FEMALE, ESTROGEN RECEPTOR POSITIVE (MULTI): ICD-10-CM

## 2024-08-27 NOTE — PROGRESS NOTES
PLASTIC SURGERY CLINIC VISIT  POSTOP BREAST RECONSTRUCTION     Date: 8/27/24  Date of Surgery: 8/5/24  Surgical Procedure: right breast capsulectomy; replacement of tissue expander with permanent implant        HPI:   Scott Leung 59 y.o. female is here for post-operative appointment for the above procedure(s).      Interval changes as of this date:   8/13 She is recovering well overall. Expected postoperative pain. Following activity restrictions and endorses adequate protein intake.  8/22 Doing well overall. Here for evaluation of ELIAS drain. Output <30 ml per day for three consecutive days. She is willing to leave drains in until 8/27.   8/27 Doing well overall. ELIAS drain in place; here for evaluation    MEDICATIONS    Current Outpatient Medications:     amLODIPine (Norvasc) 10 mg tablet, TAKE 1 TABLET BY MOUTH EVERY DAY, Disp: 90 tablet, Rfl: 1    ASPIRIN LOW-STRENGTH ORAL, Take 81 mg by mouth every other day., Disp: , Rfl:     cholecalciferol (Vitamin D-3) 25 MCG (1000 UT) capsule, Take 1 capsule (25 mcg) by mouth once daily., Disp: , Rfl:     diazePAM (Valium) 5 mg tablet, Take 1 tablet (5 mg) by mouth 1 time for 1 dose., Disp: 1 tablet, Rfl: 0    escitalopram (Lexapro) 10 mg tablet, Take 0.5 tablets (5 mg) by mouth once daily., Disp: 45 tablet, Rfl: 3    exemestane (Aromasin) 25 mg tablet, Take 1 tablet (25 mg total) by mouth once daily.  Take after a meal.  Try to take at the same time each day. (Patient not taking: Reported on 8/5/2024), Disp: 90 tablet, Rfl: 1    gabapentin (Neurontin) 300 mg capsule, Take 1 capsule (300 mg) by mouth every 8 hours for 14 days., Disp: 42 capsule, Rfl: 0    hydrocortisone 2.5 % cream, Hydrocortisone 2.5 % External Cream Quantity: 56  Refills: 0  Start: 9-Aug-2021, Disp: , Rfl:     levothyroxine (Synthroid, Levoxyl) 25 mcg tablet, TAKE 1 TABLET (25 MCG) BY MOUTH ONCE DAILY IN THE MORNING., Disp: 90 tablet, Rfl: 1    losartan (Cozaar) 100 mg tablet, Take 1 tablet (100 mg)  "by mouth once daily., Disp: 90 tablet, Rfl: 1    propranolol (Inderal) 10 mg tablet, TAKE 1 TABLET BY MOUTH TWICE A DAY, Disp: 180 tablet, Rfl: 1    rizatriptan MLT (Maxalt-MLT) 10 mg disintegrating tablet, Take 1 tablet (10 mg) by mouth 1 time if needed for migraine. May repeat once in 2 hours if needed (Maximum 2 tablets in 24 hours), Disp: 9 tablet, Rfl: 1    rosuvastatin (Crestor) 20 mg tablet, TAKE 1 TABLET BY MOUTH EVERY DAY, Disp: 90 tablet, Rfl: 1      OBJECTIVE [x]Expand by Default  Blood pressure 139/86, pulse 66, height 1.702 m (5' 7\"), weight 104 kg (230 lb).        REVIEW OF SYSTEMS:    Constitutional: negative for fevers, chills, unintentional weight loss  HEENT: negative for changes in vision, headaches, changes in hearing, congestion, sore throat  Cardiovascular: negative for chest pain, palpitations  Respiratory: negative for cough, shortness of breath  Gastrointestinal: negative for nausea, vomiting, diarrhea  Genitourinary: negative for dysuria, hematuria  Musculoskeletal: negative for joint swelling or pain  Skin: negative for rashes or lesions  Psych: negative for depression, anxiety  Endocrine: negative for polyuria, polydipsia, cold/heat intolerance  Hem/Lymph: negative for bleeding disorder     PHYSICAL EXAM  General: alert and oriented, no apparent distress    Focused exam of the breasts:  Right: incision C/D/I. Skin well perfused    No signs of cellulitis or seroma.  Rippling in upper pole.       ASSESSMENT/PLAN  Scott Leung 59 y.o. female who had right breast capsulectomy; replacement of tissue expander with permanent implant on 8/5/24 who presents for POV.     ELIAS drain <30 ml; removed per protocol    Continue activity restrictions, no walking still.   Continue adequate protein intake.   Steri-strips placed. Keep dry with hair dryer on cool setting.      RTC in 2 weeks    Attending Attestation:  Danica MORFIN MD, personal performed the history, exam, and decision making on this " patient.

## 2024-08-28 LAB
ACID FAST STN SPEC: NORMAL
MYCOBACTERIUM SPEC CULT: NORMAL

## 2024-09-04 LAB
ACID FAST STN SPEC: NORMAL
MYCOBACTERIUM SPEC CULT: NORMAL

## 2024-09-11 ENCOUNTER — APPOINTMENT (OUTPATIENT)
Dept: RADIOLOGY | Facility: CLINIC | Age: 59
End: 2024-09-11
Payer: COMMERCIAL

## 2024-09-11 LAB
ACID FAST STN SPEC: NORMAL
MYCOBACTERIUM SPEC CULT: NORMAL

## 2024-09-18 NOTE — PROGRESS NOTES
PLASTIC SURGERY CLINIC VISIT  POSTOP BREAST RECONSTRUCTION     Date: 9/19/24  Date of Surgery: 8/5/24  Surgical Procedure: right breast capsulectomy; replacement of tissue expander with permanent implant        HPI:   Scott Leung 59 y.o. female is here for post-operative appointment for the above procedure(s).      Interval changes as of this date:   8/13 She is recovering well overall. Expected postoperative pain. Following activity restrictions and endorses adequate protein intake.  8/22 Doing well overall. Here for evaluation of ELIAS drain. Output <30 ml per day for three consecutive days. She is willing to leave drains in until 8/27.   8/27 Doing well overall. ELIAS drain in place; here for evaluation  9/19 Doing well overall. No major issues.     MEDICATIONS    Current Outpatient Medications:     amLODIPine (Norvasc) 10 mg tablet, TAKE 1 TABLET BY MOUTH EVERY DAY, Disp: 90 tablet, Rfl: 1    ASPIRIN LOW-STRENGTH ORAL, Take 81 mg by mouth every other day., Disp: , Rfl:     cholecalciferol (Vitamin D-3) 25 MCG (1000 UT) capsule, Take 1 capsule (25 mcg) by mouth once daily., Disp: , Rfl:     diazePAM (Valium) 5 mg tablet, Take 1 tablet (5 mg) by mouth 1 time for 1 dose., Disp: 1 tablet, Rfl: 0    escitalopram (Lexapro) 10 mg tablet, Take 0.5 tablets (5 mg) by mouth once daily., Disp: 45 tablet, Rfl: 3    exemestane (Aromasin) 25 mg tablet, Take 1 tablet (25 mg total) by mouth once daily.  Take after a meal.  Try to take at the same time each day. (Patient not taking: Reported on 8/5/2024), Disp: 90 tablet, Rfl: 1    gabapentin (Neurontin) 300 mg capsule, Take 1 capsule (300 mg) by mouth every 8 hours for 14 days., Disp: 42 capsule, Rfl: 0    hydrocortisone 2.5 % cream, Hydrocortisone 2.5 % External Cream Quantity: 56  Refills: 0  Start: 9-Aug-2021, Disp: , Rfl:     levothyroxine (Synthroid, Levoxyl) 25 mcg tablet, TAKE 1 TABLET (25 MCG) BY MOUTH ONCE DAILY IN THE MORNING., Disp: 90 tablet, Rfl: 1    losartan  "(Cozaar) 100 mg tablet, Take 1 tablet (100 mg) by mouth once daily., Disp: 90 tablet, Rfl: 1    propranolol (Inderal) 10 mg tablet, TAKE 1 TABLET BY MOUTH TWICE A DAY, Disp: 180 tablet, Rfl: 1    rizatriptan MLT (Maxalt-MLT) 10 mg disintegrating tablet, Take 1 tablet (10 mg) by mouth 1 time if needed for migraine. May repeat once in 2 hours if needed (Maximum 2 tablets in 24 hours), Disp: 9 tablet, Rfl: 1    rosuvastatin (Crestor) 20 mg tablet, TAKE 1 TABLET BY MOUTH EVERY DAY, Disp: 90 tablet, Rfl: 1      OBJECTIVE [x]Expand by Default  Blood pressure 167/85, pulse 61, height 1.702 m (5' 7\"), weight 105 kg (231 lb 9.6 oz).    REVIEW OF SYSTEMS:    Constitutional: negative for fevers, chills, unintentional weight loss  HEENT: negative for changes in vision, headaches, changes in hearing, congestion, sore throat  Cardiovascular: negative for chest pain, palpitations  Respiratory: negative for cough, shortness of breath  Gastrointestinal: negative for nausea, vomiting, diarrhea  Genitourinary: negative for dysuria, hematuria  Musculoskeletal: negative for joint swelling or pain  Skin: negative for rashes or lesions  Psych: negative for depression, anxiety  Endocrine: negative for polyuria, polydipsia, cold/heat intolerance  Hem/Lymph: negative for bleeding disorder     PHYSICAL EXAM  General: alert and oriented, no apparent distress    Focused exam of the breasts:  Right: incision C/D/I. Skin well perfused    No signs of cellulitis or seroma. Area of implant is softer. Tethering laterally and medially, thickness to skin lateral to scar, implant is settling out and soft and mobile.        ASSESSMENT/PLAN  Scott Leung 59 y.o. female who had right breast capsulectomy; replacement of tissue expander with permanent implant on 8/5/24 who presents for POV. Will continue massage.      No activity restrictions.   Continue adequate protein intake.   Continue massage.   Need to allow more time for skin envelop to stretch and " implant to settle before pursuing second stage reconstruction    RTC 2 months to evaluate.     Scribe Attestation  By signing my name below, Jessica MORFIN Scribe   attest that this documentation has been prepared under the direction and in the presence of Danica Torres MD. Obtained verbal consent for scribe from patient.     Attending Attestation:  IDanica MD, personal performed the history, exam, and decision making on this patient.

## 2024-09-19 ENCOUNTER — OFFICE VISIT (OUTPATIENT)
Dept: PLASTIC SURGERY | Facility: CLINIC | Age: 59
End: 2024-09-19
Payer: COMMERCIAL

## 2024-09-19 VITALS
DIASTOLIC BLOOD PRESSURE: 85 MMHG | SYSTOLIC BLOOD PRESSURE: 167 MMHG | HEART RATE: 61 BPM | HEIGHT: 67 IN | BODY MASS INDEX: 36.35 KG/M2 | WEIGHT: 231.6 LBS

## 2024-09-19 DIAGNOSIS — C50.111 MALIGNANT NEOPLASM OF CENTRAL PORTION OF RIGHT BREAST IN FEMALE, ESTROGEN RECEPTOR POSITIVE: Primary | ICD-10-CM

## 2024-09-19 DIAGNOSIS — Z17.0 MALIGNANT NEOPLASM OF CENTRAL PORTION OF RIGHT BREAST IN FEMALE, ESTROGEN RECEPTOR POSITIVE: Primary | ICD-10-CM

## 2024-09-19 LAB
ACID FAST STN SPEC: NORMAL
MYCOBACTERIUM SPEC CULT: NORMAL

## 2024-09-19 PROCEDURE — 3079F DIAST BP 80-89 MM HG: CPT | Performed by: SURGERY

## 2024-09-19 PROCEDURE — 99024 POSTOP FOLLOW-UP VISIT: CPT | Performed by: SURGERY

## 2024-09-19 PROCEDURE — 3008F BODY MASS INDEX DOCD: CPT | Performed by: SURGERY

## 2024-09-19 PROCEDURE — 3077F SYST BP >= 140 MM HG: CPT | Performed by: SURGERY

## 2024-09-24 ENCOUNTER — APPOINTMENT (OUTPATIENT)
Dept: PLASTIC SURGERY | Facility: CLINIC | Age: 59
End: 2024-09-24
Payer: COMMERCIAL

## 2024-09-25 LAB
ACID FAST STN SPEC: NORMAL
MYCOBACTERIUM SPEC CULT: NORMAL

## 2024-09-26 DIAGNOSIS — G43.909 MIGRAINE, UNSPECIFIED, NOT INTRACTABLE, WITHOUT STATUS MIGRAINOSUS: ICD-10-CM

## 2024-09-26 DIAGNOSIS — E78.5 HYPERLIPIDEMIA, UNSPECIFIED: ICD-10-CM

## 2024-09-26 DIAGNOSIS — E03.9 ACQUIRED HYPOTHYROIDISM: Primary | ICD-10-CM

## 2024-09-26 DIAGNOSIS — I10 BENIGN ESSENTIAL HYPERTENSION: ICD-10-CM

## 2024-10-01 RX ORDER — LEVOTHYROXINE SODIUM 25 UG/1
25 TABLET ORAL EVERY MORNING
Qty: 90 TABLET | Refills: 0 | Status: SHIPPED | OUTPATIENT
Start: 2024-10-01

## 2024-10-01 RX ORDER — AMLODIPINE BESYLATE 10 MG/1
10 TABLET ORAL DAILY
Qty: 90 TABLET | Refills: 0 | Status: SHIPPED | OUTPATIENT
Start: 2024-10-01

## 2024-10-01 RX ORDER — PROPRANOLOL HYDROCHLORIDE 10 MG/1
10 TABLET ORAL 2 TIMES DAILY
Qty: 180 TABLET | Refills: 0 | Status: SHIPPED | OUTPATIENT
Start: 2024-10-01

## 2024-10-01 RX ORDER — ROSUVASTATIN CALCIUM 20 MG/1
20 TABLET, COATED ORAL DAILY
Qty: 90 TABLET | Refills: 0 | Status: SHIPPED | OUTPATIENT
Start: 2024-10-01

## 2024-10-09 ENCOUNTER — APPOINTMENT (OUTPATIENT)
Dept: PRIMARY CARE | Facility: CLINIC | Age: 59
End: 2024-10-09
Payer: COMMERCIAL

## 2024-10-09 VITALS
HEART RATE: 88 BPM | WEIGHT: 227.4 LBS | DIASTOLIC BLOOD PRESSURE: 81 MMHG | BODY MASS INDEX: 35.69 KG/M2 | SYSTOLIC BLOOD PRESSURE: 131 MMHG | HEIGHT: 67 IN

## 2024-10-09 DIAGNOSIS — Z00.00 ROUTINE GENERAL MEDICAL EXAMINATION AT A HEALTH CARE FACILITY: Primary | ICD-10-CM

## 2024-10-09 DIAGNOSIS — G47.9 SLEEP DISTURBANCES: ICD-10-CM

## 2024-10-09 DIAGNOSIS — Z12.11 SCREENING FOR COLORECTAL CANCER: ICD-10-CM

## 2024-10-09 DIAGNOSIS — Z12.12 SCREENING FOR COLORECTAL CANCER: ICD-10-CM

## 2024-10-09 DIAGNOSIS — E78.5 HYPERLIPIDEMIA, UNSPECIFIED: ICD-10-CM

## 2024-10-09 PROBLEM — H90.5 SENSORINEURAL HEARING LOSS (SNHL): Status: ACTIVE | Noted: 2024-10-09

## 2024-10-09 PROCEDURE — 3079F DIAST BP 80-89 MM HG: CPT | Performed by: INTERNAL MEDICINE

## 2024-10-09 PROCEDURE — 93000 ELECTROCARDIOGRAM COMPLETE: CPT | Performed by: INTERNAL MEDICINE

## 2024-10-09 PROCEDURE — 1036F TOBACCO NON-USER: CPT | Performed by: INTERNAL MEDICINE

## 2024-10-09 PROCEDURE — 3008F BODY MASS INDEX DOCD: CPT | Performed by: INTERNAL MEDICINE

## 2024-10-09 PROCEDURE — 3075F SYST BP GE 130 - 139MM HG: CPT | Performed by: INTERNAL MEDICINE

## 2024-10-09 PROCEDURE — 99396 PREV VISIT EST AGE 40-64: CPT | Performed by: INTERNAL MEDICINE

## 2024-10-09 RX ORDER — ROSUVASTATIN CALCIUM 20 MG/1
10 TABLET, COATED ORAL DAILY
Qty: 90 TABLET | Refills: 0 | Status: SHIPPED | OUTPATIENT
Start: 2024-10-09

## 2024-10-09 RX ORDER — AMITRIPTYLINE HYDROCHLORIDE 25 MG/1
25 TABLET, FILM COATED ORAL NIGHTLY PRN
Qty: 30 TABLET | Refills: 1 | Status: SHIPPED | OUTPATIENT
Start: 2024-10-09 | End: 2025-10-09

## 2024-10-09 ASSESSMENT — ENCOUNTER SYMPTOMS
RHINORRHEA: 0
CHEST TIGHTNESS: 0
NAUSEA: 0
DIARRHEA: 0
HEADACHES: 0
SINUS PRESSURE: 0
ADENOPATHY: 0
VOICE CHANGE: 0
PALPITATIONS: 0
HYPERACTIVE: 0
DIZZINESS: 0
SORE THROAT: 0
BRUISES/BLEEDS EASILY: 0
CHILLS: 0
SHORTNESS OF BREATH: 0
ABDOMINAL DISTENTION: 0
ACTIVITY CHANGE: 1
NERVOUS/ANXIOUS: 0
NUMBNESS: 0
DYSPHORIC MOOD: 0
FATIGUE: 1
MYALGIAS: 1
VOMITING: 0
FEVER: 0
HEMATURIA: 0
ABDOMINAL PAIN: 0
DYSURIA: 0
LIGHT-HEADEDNESS: 0
SLEEP DISTURBANCE: 1
NECK PAIN: 0
EYE DISCHARGE: 0
WHEEZING: 0
FREQUENCY: 0
COLOR CHANGE: 0
COUGH: 0
ARTHRALGIAS: 1
SINUS PAIN: 0
EYE ITCHING: 0
DIFFICULTY URINATING: 0
WEAKNESS: 0
DECREASED CONCENTRATION: 0
CONSTIPATION: 0

## 2024-10-09 ASSESSMENT — PROMIS GLOBAL HEALTH SCALE
EMOTIONAL_PROBLEMS: SOMETIMES
RATE_SOCIAL_SATISFACTION: VERY GOOD
RATE_MENTAL_HEALTH: VERY GOOD
RATE_PHYSICAL_HEALTH: FAIR
CARRYOUT_PHYSICAL_ACTIVITIES: MOSTLY
RATE_QUALITY_OF_LIFE: VERY GOOD
CARRYOUT_SOCIAL_ACTIVITIES: VERY GOOD
RATE_AVERAGE_PAIN: 5
RATE_AVERAGE_FATIGUE: SEVERE
RATE_GENERAL_HEALTH: FAIR

## 2024-10-09 NOTE — PATIENT INSTRUCTIONS
It was a pleasure seeing you in the office today.  At a time that is convenient for you, please obtain your bloodwork on a fasting basis.  We will then follow up on these results once available.  Please continue follow-up with your specialists.  Someone should be contacting you shortly to help schedule a colonoscopy.  I have placed an order for amitriptyline, which may help with sleep.  Please let us know how you are doing with this and if we need to make any dose adjustments.  Please reach out with any questions or need for refills.  If all remains well, we will plan on seeing you back for brief follow-up in 6 months.

## 2024-10-21 ENCOUNTER — HOSPITAL ENCOUNTER (OUTPATIENT)
Dept: RADIOLOGY | Facility: CLINIC | Age: 59
Discharge: HOME | End: 2024-10-21
Payer: COMMERCIAL

## 2024-10-21 DIAGNOSIS — Z12.11 COLON CANCER SCREENING: ICD-10-CM

## 2024-10-21 DIAGNOSIS — C50.111 MALIGNANT NEOPLASM OF CENTRAL PORTION OF RIGHT BREAST IN FEMALE, ESTROGEN RECEPTOR POSITIVE: ICD-10-CM

## 2024-10-21 DIAGNOSIS — Z17.0 MALIGNANT NEOPLASM OF CENTRAL PORTION OF RIGHT BREAST IN FEMALE, ESTROGEN RECEPTOR POSITIVE: ICD-10-CM

## 2024-10-21 PROCEDURE — 77080 DXA BONE DENSITY AXIAL: CPT

## 2024-10-21 PROCEDURE — 77080 DXA BONE DENSITY AXIAL: CPT | Performed by: RADIOLOGY

## 2024-10-21 RX ORDER — POLYETHYLENE GLYCOL 3350, SODIUM SULFATE ANHYDROUS, SODIUM BICARBONATE, SODIUM CHLORIDE, POTASSIUM CHLORIDE 236; 22.74; 6.74; 5.86; 2.97 G/4L; G/4L; G/4L; G/4L; G/4L
POWDER, FOR SOLUTION ORAL
Qty: 4000 ML | Refills: 0 | Status: SHIPPED | OUTPATIENT
Start: 2024-10-21

## 2024-10-22 ENCOUNTER — OFFICE VISIT (OUTPATIENT)
Dept: HEMATOLOGY/ONCOLOGY | Facility: CLINIC | Age: 59
End: 2024-10-22
Payer: COMMERCIAL

## 2024-10-22 VITALS — HEART RATE: 77 BPM | TEMPERATURE: 97.2 F | SYSTOLIC BLOOD PRESSURE: 144 MMHG | DIASTOLIC BLOOD PRESSURE: 86 MMHG

## 2024-10-22 DIAGNOSIS — Z17.0 MALIGNANT NEOPLASM OF CENTRAL PORTION OF RIGHT BREAST IN FEMALE, ESTROGEN RECEPTOR POSITIVE: ICD-10-CM

## 2024-10-22 DIAGNOSIS — C50.111 MALIGNANT NEOPLASM OF CENTRAL PORTION OF RIGHT BREAST IN FEMALE, ESTROGEN RECEPTOR POSITIVE: ICD-10-CM

## 2024-10-22 PROCEDURE — 3077F SYST BP >= 140 MM HG: CPT | Performed by: STUDENT IN AN ORGANIZED HEALTH CARE EDUCATION/TRAINING PROGRAM

## 2024-10-22 PROCEDURE — 3079F DIAST BP 80-89 MM HG: CPT | Performed by: STUDENT IN AN ORGANIZED HEALTH CARE EDUCATION/TRAINING PROGRAM

## 2024-10-22 PROCEDURE — 99215 OFFICE O/P EST HI 40 MIN: CPT | Performed by: STUDENT IN AN ORGANIZED HEALTH CARE EDUCATION/TRAINING PROGRAM

## 2024-10-22 ASSESSMENT — PAIN SCALES - GENERAL: PAINLEVEL_OUTOF10: 0-NO PAIN

## 2024-10-22 NOTE — PROGRESS NOTES
Breast Medical Oncology Clinic  Location: [facility]    Visit Type: Follow-up Visit    Oncologic History:    6/8/2023: Screening mammogram: No mammographic evidence of malignancy.    Patient palpated right breast mass.    2/19/2024: Diagnostic breast imaging: On mammogram there is a masslike focal asymmetry with spiculations in the central breast at mid depth.  On ultrasound there is a mass seen in the subareolar region at 11 o'clock position measuring 2.9 x 2.3 x 1.9 cm.  4 morphologically normal right axillary lymph nodes noted.    3/1/2024: Right breast ultrasound-guided core needle biopsy: Invasive lobular carcinoma grade 2, ER positive (85%) MN negative and HER2 negative.  There is atypical lobular hyperplasia present.    3/22/2024: MRI of the breast: Within the right breast there is an irregular mass with biopsy clip centered within the mass.  There is additional surrounding confluent non-mass enhancement, low level and intensity.  This is asymmetric to the left breast.  This extends posteriorly from the mass then anteriorly to involve the nipple areolar complex.  This entire area spans 5.2 x 3.6 x 2.8 cm.  There is no evidence of pectoralis or chest wall involvement.  No axillary or internal mammary lymphadenopathy present.  No findings in the left breast.    Patient elected to defer surgery for several months to be able to attend her son's wedding.  She was placed on anastrozole for about 2 to 3 months.  She reports she did not tolerate it well and developed daily migraines.  She has a history of migraines.  Migraines resolved with discontinuation of anastrozole preoperatively.    6/10/2024: Right breast mastectomy and sentinel lymph node biopsy and tissue expander placement: Final pathology yields multiple foci and invasive carcinoma, largest invasive lobular carcinoma foci measuring 4.2 cm.  There is LCIS present.  All margins are negative.  4 regional lymph nodes examined and all negative for tumor.  PT2  pN0    Oncotype DX recurrence score 18    24: Started exemestane    Subjective: History of Present Illness    Patient presents today for follow-up visit.  She has been on exemestane for about 3 months.  She describes some generalized achiness mild.  Mostly in her hands and her feet.  She finds that when she stretches her hands and her feet it feels better.  She has occasional hot flashes.  She denies having any migraines.  She has some mild fatigue.  She is back to work and is able to carry out her day-to-day activities.  At this time she reports side effects as manageable. Denies weight loss, changes in the breast and/or chest wall,  changes in appetite or energy level.     Gynecologic History:     Age of first menses: 12 years old  Age of last menses: 41 years old, post-menopausal  ; FLB at age 22  She did not breastfeed  Uterus/Ovaries: Intact  OCP: <5 years  HRT: Yes    Pertinent Family history:    Breast Cancer: Paternal cousins X5   Ovarian Cancer: None  Pancreatic Cancer: None  Other:  father- liver cancer; mother- bone cancer    Social History  Scott Leung  reports that she has never smoked. She has never used smokeless tobacco.  She  reports current alcohol use of about 8.0 standard drinks of alcohol per week.  She  reports no history of drug use.    ROS:     Review of Systems   All other systems reviewed and are negative.       Physical Examination:    /86 (BP Location: Right arm, Patient Position: Sitting, BP Cuff Size: Large adult)   Pulse 77   Temp 36.2 °C (97.2 °F) (Temporal)     Physical Exam  Vitals and nursing note reviewed.   Constitutional:       General: She is not in acute distress.     Appearance: Normal appearance. She is not toxic-appearing.   HENT:      Head: Normocephalic and atraumatic.   Eyes:      Conjunctiva/sclera: Conjunctivae normal.   Cardiovascular:      Rate and Rhythm: Normal rate.   Pulmonary:      Effort: Pulmonary effort is normal. No respiratory distress.    Abdominal:      General: Abdomen is flat.      Palpations: Abdomen is soft.   Musculoskeletal:         General: No swelling. Normal range of motion.      Cervical back: Normal range of motion.   Skin:     General: Skin is warm and dry.   Neurological:      Mental Status: She is alert.   Psychiatric:         Mood and Affect: Mood normal.       Breast Examination:    Status post bilateral mastectomy with reconstruction of the right breast.  Left breast without masses skin or nipple changes.    ECOG Performance Status:     [] 0 Fully active, able to carry on all pre-disease performance without restriction  [] 1 Restricted in physically strenuous activity but ambulatory and able to carry out work of a light or sedentary nature, e.g., light house work, office work  [] 2 Ambulatory and capable of all selfcare but unable to carry out any work activities; up and about more than 50% of waking hours  [] 3 Capable of only limited selfcare; confined to bed or chair more than 50% of waking hours  [] 4 Completely disabled; cannot carry on any selfcare; totally confined to bed or chair  [] 5 Dead     Results:    Labs:  Reviewed above in Onc History    Lab Results   Component Value Date    WBC 6.4 05/28/2024    HGB 13.3 05/28/2024    HCT 39.8 05/28/2024    MCV 93 05/28/2024     05/28/2024       Chemistry    Lab Results   Component Value Date/Time     05/28/2024 1210    K 4.3 05/28/2024 1210     05/28/2024 1210    CO2 27 05/28/2024 1210    BUN 13 05/28/2024 1210    CREATININE 0.84 05/28/2024 1210    Lab Results   Component Value Date/Time    CALCIUM 9.4 05/28/2024 1210    ALKPHOS 80 05/28/2024 1210    AST 25 05/28/2024 1210    ALT 30 05/28/2024 1210    BILITOT 0.6 05/28/2024 1210             Imaging:  Reviewed above in Onc History    Pathology:  Reviewed above in Onc History    Assessment:     Pathologic prognostic stage IA (pT2 pN0 cM0) infiltrating lobular carcinoma of the right breast; Dx in 3/2024; Grade 2;  ER positive (85%), DE negative, HER2 negative; Oncotype DX 18 ; S/p right mastectomy and sentinel lymph node biopsy; On exemestane    Tolerating exemestane relatively well. No evidence of breast cancer recurrence per patient history, physical examination and imaging findings.       Breast Cancer Treatment Plan:    Surgical Plan: S/p R mastectomy with SLNBx  Additional biopsy: No further biopsy indicated  Radiation Plan: Not indicated, discussed at TB  Additional staging scans/DEXA/echo: Staging scans not indicated based on current stage, patient history and physical examination.  10/2024 DEXA normal  Additional Path info (i.e Ki67, PDL1): Not indicated  Gene assays: Oncotype DX 18    Systemic treatment plan: Exemestane   Intent: Curative   Clinical trial: Not eligible for our current trials   Endocrine therapy: Exemestane; did not tolerate anastrozole developed daily migraines   HER2 treatment: not indicated   Targeted agents: not indicated   Chemotherapy: Not indicated   BMA: Briefly discussed, minimal benefit given normal bone density and stage I disease.  Agreed to defer    Access: Not indicated  Supportive meds: No new supportive medications prescribed  Genetic testing: Completed; negative  Fertility preservation: Not indicated    Other active problems/orders:     We spent a portion of our encounter discussing lifestyle modifications that may help with cancer outcomes and overall wellbeing. We discussed regular exercise aiming for 30 minutes 5 times a week. We discussed diet modifications such as limiting red meat and processed foods. We also discussed limiting alcohol intake.    Surveillance plan: Continue yearly mammogram of the left breast, scheduled for for 2025    Follow-up: 6 months    Scott Leung expressed understanding of the plan outlined above. Scott Leung had ample time to ask questions. Scott Leung understands she can contact us at any time should she have additional questions or issues arise  in the interim.    Clarence Shore MD  Breast Medical Oncology  [facility]    Portions of this note were dictated utilizing voice recognition software.

## 2024-11-18 NOTE — PROGRESS NOTES
PLASTIC SURGERY CLINIC VISIT  POSTOP BREAST RECONSTRUCTION     Date: 11/26/24  Date of Surgery: 8/5/24  Surgical Procedure: right breast capsulectomy; replacement of tissue expander with permanent implant        HPI:   Scott Leung 59 y.o. female is here for post-operative appointment for the above procedure(s).      Interval changes as of this date:   8/13 She is recovering well overall. Expected postoperative pain. Following activity restrictions and endorses adequate protein intake.  8/22 Doing well overall. Here for evaluation of ELIAS drain. Output <30 ml per day for three consecutive days. She is willing to leave drains in until 8/27.   8/27 Doing well overall. ELIAS drain in place; here for evaluation  9/19 Doing well overall. No major issues.   11/26 Doing well overall, has been doing breast massage. Would like to proceed with next surgery.     MEDICATIONS    Current Outpatient Medications:     amitriptyline (Elavil) 25 mg tablet, Take 1 tablet (25 mg) by mouth as needed at bedtime for sleep., Disp: 30 tablet, Rfl: 1    amLODIPine (Norvasc) 10 mg tablet, Take 1 tablet (10 mg) by mouth once daily., Disp: 90 tablet, Rfl: 0    ASPIRIN LOW-STRENGTH ORAL, Take 81 mg by mouth every other day., Disp: , Rfl:     cholecalciferol (Vitamin D-3) 25 MCG (1000 UT) capsule, Take 1 capsule (25 mcg) by mouth once daily., Disp: , Rfl:     escitalopram (Lexapro) 10 mg tablet, Take 0.5 tablets (5 mg) by mouth once daily., Disp: 45 tablet, Rfl: 3    exemestane (Aromasin) 25 mg tablet, Take 1 tablet (25 mg total) by mouth once daily.  Take after a meal.  Try to take at the same time each day., Disp: 90 tablet, Rfl: 1    gabapentin (Neurontin) 300 mg capsule, Take 1 capsule (300 mg) by mouth every 8 hours for 14 days., Disp: 42 capsule, Rfl: 0    hydrocortisone 2.5 % cream, Hydrocortisone 2.5 % External Cream Quantity: 56  Refills: 0  Start: 9-Aug-2021, Disp: , Rfl:     levothyroxine (Synthroid, Levoxyl) 25 mcg tablet, Take 1  "tablet (25 mcg) by mouth once daily in the morning., Disp: 90 tablet, Rfl: 0    losartan (Cozaar) 100 mg tablet, Take 1 tablet (100 mg) by mouth once daily., Disp: 90 tablet, Rfl: 1    polyethylene glycol (GoLYTELY) 236-22.74-6.74 -5.86 gram solution, Drink 1/2 starting at 6 pm the night before your procedure then drink the 2nd 1/2 5 hours before procedure arrival time (Patient not taking: Reported on 10/22/2024), Disp: 4000 mL, Rfl: 0    propranolol (Inderal) 10 mg tablet, Take 1 tablet (10 mg) by mouth 2 times a day., Disp: 180 tablet, Rfl: 0    rizatriptan MLT (Maxalt-MLT) 10 mg disintegrating tablet, Take 1 tablet (10 mg) by mouth 1 time if needed for migraine. May repeat once in 2 hours if needed (Maximum 2 tablets in 24 hours), Disp: 9 tablet, Rfl: 1    rosuvastatin (Crestor) 20 mg tablet, Take 0.5 tablets (10 mg) by mouth once daily., Disp: 90 tablet, Rfl: 0      OBJECTIVE [x]Expand by Default  Blood pressure 144/80, pulse 87, height 1.702 m (5' 7\"), weight 104 kg (230 lb).      REVIEW OF SYSTEMS:    Constitutional: negative for fevers, chills, unintentional weight loss  HEENT: negative for changes in vision, headaches, changes in hearing, congestion, sore throat  Cardiovascular: negative for chest pain, palpitations  Respiratory: negative for cough, shortness of breath  Gastrointestinal: negative for nausea, vomiting, diarrhea  Genitourinary: negative for dysuria, hematuria  Musculoskeletal: negative for joint swelling or pain  Skin: negative for rashes or lesions  Psych: negative for depression, anxiety  Endocrine: negative for polyuria, polydipsia, cold/heat intolerance  Hem/Lymph: negative for bleeding disorder     PHYSICAL EXAM  General: alert and oriented, no apparent distress    Focused exam of the breasts:  Right: incision C/D/I. Skin well perfused    No signs of cellulitis or seroma.   Step off at chest wall and implant. Volume too small     Left breast: with ptosis and lack of superior pole volume "      Order   Right    Moderate Plus 475 ml   Moderate Plus 425 ml  Moderate Classic 405 ml    Left   Moderate Classic 190 ml, 235 ml, 275 ml      ASSESSMENT/PLAN  Scott Leung 59 y.o. female who had right breast capsulectomy; replacement of tissue expander with permanent implant on 8/5/24 who presents for POV. Will continue massage.  Plan for right implant exchange and left mastopexy with implant on 12/20/24.      No activity restrictions.   Continue adequate protein intake.   Continue massage.   Left mastopexy and augmentation for upper pole volume for symmetry   Left vertical mastopexy with 10-11/12 cm BW  Right implant exchange, initially placed a smaller implant due to issues with wound healing. Will now need to change it with a bigger implant to match larger BW.   Implant on the right side 13-14 cm BW  Will evaluate for fat grafting later    Scribe Attestation  By signing my name below, NAVJOT Bashircoral Chen, [place], attest that this documentation has been prepared under the direction and in the presence of Danica Torres MD. Verbal consent was obtained from the patient.    Attending Attestation:  Danica MORFIN MD, personal performed the history, exam, and decision making on this patient.  A total of 30 mins were spent in patient counseling, review of medical records, and coordination of care.

## 2024-11-26 ENCOUNTER — HOSPITAL ENCOUNTER (OUTPATIENT)
Facility: HOSPITAL | Age: 59
Setting detail: OUTPATIENT SURGERY
End: 2024-11-26
Attending: SURGERY | Admitting: SURGERY
Payer: COMMERCIAL

## 2024-11-26 ENCOUNTER — LAB (OUTPATIENT)
Dept: LAB | Facility: LAB | Age: 59
End: 2024-11-26
Payer: COMMERCIAL

## 2024-11-26 ENCOUNTER — APPOINTMENT (OUTPATIENT)
Dept: PLASTIC SURGERY | Facility: CLINIC | Age: 59
End: 2024-11-26
Payer: COMMERCIAL

## 2024-11-26 VITALS
BODY MASS INDEX: 36.1 KG/M2 | HEIGHT: 67 IN | DIASTOLIC BLOOD PRESSURE: 80 MMHG | HEART RATE: 87 BPM | SYSTOLIC BLOOD PRESSURE: 144 MMHG | WEIGHT: 230 LBS

## 2024-11-26 DIAGNOSIS — C50.111 MALIGNANT NEOPLASM OF CENTRAL PORTION OF RIGHT BREAST IN FEMALE, ESTROGEN RECEPTOR POSITIVE: Primary | ICD-10-CM

## 2024-11-26 DIAGNOSIS — N65.1 BREAST ASYMMETRY BETWEEN NATIVE BREAST AND RECONSTRUCTED BREAST: ICD-10-CM

## 2024-11-26 DIAGNOSIS — C50.111 MALIGNANT NEOPLASM OF CENTRAL PORTION OF RIGHT FEMALE BREAST, UNSPECIFIED ESTROGEN RECEPTOR STATUS: ICD-10-CM

## 2024-11-26 DIAGNOSIS — Z00.00 ROUTINE GENERAL MEDICAL EXAMINATION AT A HEALTH CARE FACILITY: ICD-10-CM

## 2024-11-26 DIAGNOSIS — Z17.0 MALIGNANT NEOPLASM OF CENTRAL PORTION OF RIGHT BREAST IN FEMALE, ESTROGEN RECEPTOR POSITIVE: Primary | ICD-10-CM

## 2024-11-26 LAB
25(OH)D3 SERPL-MCNC: 45 NG/ML (ref 30–100)
ALBUMIN SERPL BCP-MCNC: 4.8 G/DL (ref 3.4–5)
ALP SERPL-CCNC: 113 U/L (ref 33–136)
ALT SERPL W P-5'-P-CCNC: 35 U/L (ref 7–45)
ANION GAP SERPL CALC-SCNC: 15 MMOL/L (ref 10–20)
AST SERPL W P-5'-P-CCNC: 27 U/L (ref 9–39)
BASOPHILS # BLD AUTO: 0.07 X10*3/UL (ref 0–0.1)
BASOPHILS NFR BLD AUTO: 0.9 %
BILIRUB SERPL-MCNC: 0.7 MG/DL (ref 0–1.2)
BUN SERPL-MCNC: 10 MG/DL (ref 6–23)
CALCIUM SERPL-MCNC: 10.1 MG/DL (ref 8.6–10.6)
CHLORIDE SERPL-SCNC: 105 MMOL/L (ref 98–107)
CHOLEST SERPL-MCNC: 138 MG/DL (ref 0–199)
CHOLESTEROL/HDL RATIO: 3.2
CK SERPL-CCNC: 82 U/L (ref 0–215)
CO2 SERPL-SCNC: 28 MMOL/L (ref 21–32)
CREAT SERPL-MCNC: 0.94 MG/DL (ref 0.5–1.05)
CREAT UR-MCNC: 64.3 MG/DL (ref 20–320)
EGFRCR SERPLBLD CKD-EPI 2021: 70 ML/MIN/1.73M*2
EOSINOPHIL # BLD AUTO: 0.15 X10*3/UL (ref 0–0.7)
EOSINOPHIL NFR BLD AUTO: 1.9 %
ERYTHROCYTE [DISTWIDTH] IN BLOOD BY AUTOMATED COUNT: 12.8 % (ref 11.5–14.5)
EST. AVERAGE GLUCOSE BLD GHB EST-MCNC: 94 MG/DL
GLUCOSE SERPL-MCNC: 121 MG/DL (ref 74–99)
HBA1C MFR BLD: 4.9 %
HCT VFR BLD AUTO: 43.2 % (ref 36–46)
HDLC SERPL-MCNC: 43.3 MG/DL
HGB BLD-MCNC: 14.6 G/DL (ref 12–16)
IMM GRANULOCYTES # BLD AUTO: 0.02 X10*3/UL (ref 0–0.7)
IMM GRANULOCYTES NFR BLD AUTO: 0.3 % (ref 0–0.9)
IRON SATN MFR SERPL: 25 % (ref 25–45)
IRON SERPL-MCNC: 90 UG/DL (ref 35–150)
LDLC SERPL CALC-MCNC: 55 MG/DL
LYMPHOCYTES # BLD AUTO: 1.37 X10*3/UL (ref 1.2–4.8)
LYMPHOCYTES NFR BLD AUTO: 17.7 %
MCH RBC QN AUTO: 30.4 PG (ref 26–34)
MCHC RBC AUTO-ENTMCNC: 33.8 G/DL (ref 32–36)
MCV RBC AUTO: 90 FL (ref 80–100)
MICROALBUMIN UR-MCNC: <7 MG/L
MICROALBUMIN/CREAT UR: NORMAL MG/G{CREAT}
MONOCYTES # BLD AUTO: 0.79 X10*3/UL (ref 0.1–1)
MONOCYTES NFR BLD AUTO: 10.2 %
NEUTROPHILS # BLD AUTO: 5.32 X10*3/UL (ref 1.2–7.7)
NEUTROPHILS NFR BLD AUTO: 69 %
NON HDL CHOLESTEROL: 95 MG/DL (ref 0–149)
NRBC BLD-RTO: 0 /100 WBCS (ref 0–0)
PLATELET # BLD AUTO: 341 X10*3/UL (ref 150–450)
POTASSIUM SERPL-SCNC: 4.5 MMOL/L (ref 3.5–5.3)
PROT SERPL-MCNC: 7.2 G/DL (ref 6.4–8.2)
RBC # BLD AUTO: 4.81 X10*6/UL (ref 4–5.2)
SODIUM SERPL-SCNC: 143 MMOL/L (ref 136–145)
TIBC SERPL-MCNC: 360 UG/DL (ref 240–445)
TRIGL SERPL-MCNC: 200 MG/DL (ref 0–149)
TSH SERPL-ACNC: 2.29 MIU/L (ref 0.44–3.98)
UIBC SERPL-MCNC: 270 UG/DL (ref 110–370)
URATE SERPL-MCNC: 6.8 MG/DL (ref 2.3–6.7)
VIT B12 SERPL-MCNC: 525 PG/ML (ref 211–911)
VLDL: 40 MG/DL (ref 0–40)
WBC # BLD AUTO: 7.7 X10*3/UL (ref 4.4–11.3)

## 2024-11-26 PROCEDURE — 82550 ASSAY OF CK (CPK): CPT

## 2024-11-26 PROCEDURE — 83036 HEMOGLOBIN GLYCOSYLATED A1C: CPT

## 2024-11-26 PROCEDURE — 82570 ASSAY OF URINE CREATININE: CPT

## 2024-11-26 PROCEDURE — 99214 OFFICE O/P EST MOD 30 MIN: CPT | Performed by: SURGERY

## 2024-11-26 PROCEDURE — 36415 COLL VENOUS BLD VENIPUNCTURE: CPT

## 2024-11-26 PROCEDURE — 82306 VITAMIN D 25 HYDROXY: CPT

## 2024-11-26 PROCEDURE — 84550 ASSAY OF BLOOD/URIC ACID: CPT

## 2024-11-26 PROCEDURE — 3077F SYST BP >= 140 MM HG: CPT | Performed by: SURGERY

## 2024-11-26 PROCEDURE — 3079F DIAST BP 80-89 MM HG: CPT | Performed by: SURGERY

## 2024-11-26 PROCEDURE — 82607 VITAMIN B-12: CPT

## 2024-11-26 PROCEDURE — 80061 LIPID PANEL: CPT

## 2024-11-26 PROCEDURE — 82043 UR ALBUMIN QUANTITATIVE: CPT

## 2024-11-26 PROCEDURE — 83540 ASSAY OF IRON: CPT

## 2024-11-26 PROCEDURE — 84443 ASSAY THYROID STIM HORMONE: CPT

## 2024-11-26 PROCEDURE — 83550 IRON BINDING TEST: CPT

## 2024-11-26 PROCEDURE — 85025 COMPLETE CBC W/AUTO DIFF WBC: CPT

## 2024-11-26 PROCEDURE — 3008F BODY MASS INDEX DOCD: CPT | Performed by: SURGERY

## 2024-11-26 PROCEDURE — 80053 COMPREHEN METABOLIC PANEL: CPT

## 2024-11-26 RX ORDER — ACETAMINOPHEN 325 MG/1
650 TABLET ORAL EVERY 4 HOURS PRN
OUTPATIENT
Start: 2024-11-26

## 2024-11-26 RX ORDER — GABAPENTIN 600 MG/1
600 TABLET ORAL ONCE
OUTPATIENT
Start: 2024-11-26 | End: 2024-11-26

## 2024-11-26 RX ORDER — SCOLOPAMINE TRANSDERMAL SYSTEM 1 MG/1
1 PATCH, EXTENDED RELEASE TRANSDERMAL ONCE
OUTPATIENT
Start: 2024-11-26 | End: 2024-11-26

## 2024-11-26 RX ORDER — ACETAMINOPHEN 160 MG/5ML
650 SOLUTION ORAL EVERY 4 HOURS PRN
OUTPATIENT
Start: 2024-11-26

## 2024-11-26 NOTE — LETTER
November 26, 2024     Patient: Scott Leung   YOB: 1965   Date of Visit: 11/26/2024       To Whom It May Concern:    Scott Leung was seen in my clinic on 11/26/2024. Please excuse Scott for her absence from work on this day to make the appointment.    If you have any questions or concerns, please don't hesitate to call.         Sincerely,         Danica Torres MD        CC: No Recipients

## 2024-12-06 ENCOUNTER — CLINICAL SUPPORT (OUTPATIENT)
Dept: PREADMISSION TESTING | Facility: HOSPITAL | Age: 59
End: 2024-12-06
Payer: COMMERCIAL

## 2024-12-06 DIAGNOSIS — C50.111 MALIGNANT NEOPLASM OF CENTRAL PORTION OF RIGHT BREAST IN FEMALE, ESTROGEN RECEPTOR POSITIVE: ICD-10-CM

## 2024-12-06 DIAGNOSIS — Z17.0 MALIGNANT NEOPLASM OF CENTRAL PORTION OF RIGHT BREAST IN FEMALE, ESTROGEN RECEPTOR POSITIVE: ICD-10-CM

## 2024-12-06 NOTE — CPM/PAT NURSE NOTE
CPM/PAT Nurse Note      Name: Scott Leung (Scott Leung)  /Age: 1965/59 y.o.       Past Medical History:   Diagnosis Date    Arthritis     Breast cancer (Multi) 2024    Right    Depression     Head injury 2023    HTN (hypertension)     Hyperlipidemia     Hypothyroidism     Migraines     Obesity     Scoliosis        Past Surgical History:   Procedure Laterality Date    INNER EAR SURGERY      KNEE ARTHROSCOPY W/ DEBRIDEMENT      MASTECTOMY W/ SENTINEL NODE BIOPSY Right 06/10/2024    with tissue expander    NASAL ENDOSCOPY      OVARIAN CYST REMOVAL      TUBAL LIGATION      also had uterine ablation    ULNAR NERVE TRANSPOSITION      WISDOM TOOTH EXTRACTION         Patient  reports that she is not currently sexually active and has had partner(s) who are male. She reports using the following method of birth control/protection: Female Sterilization.    Family History   Problem Relation Name Age of Onset    Atrial fibrillation Mother Megan     Other (htn) Mother Megan     Multiple myeloma Mother Megan     Peripheral vascular disease Mother Megan     Other (thyroid disorder) Mother Megan     Arthritis Mother Megan     Liver cancer Father      Atrial fibrillation Sister Tammy     Other (crest) Sister Tammy     Other (pulmonary htn) Sister Tammy     Other (pulmonary occlusive disease) Sister Tammy     Colonic polyp Sister Tammy     Peripheral vascular disease Sister Tammy     Other (thyroid disorder) Sister Tammy     Colon cancer Sister Tammy         detected abnormal cells and had surgery and was told family members should be screened every 5 years    Atrial fibrillation Brother Jad     Diabetes Brother Jad     Other (cabg) Brother Jad     Peripheral vascular disease Brother Jad     Atrial fibrillation Brother Kenneth     Heart disease Brother Kenneth     Atrial fibrillation Brother Sudheer     Diabetes Brother Sudheer     Heart disease Brother Sudheer     Stroke Brother Sudheer     Other (CREST) Other          Allergies   Allergen Reactions    Latex Rash    Tetanus Vaccines And Toxoid Fever       Prior to Admission medications    Medication Sig Start Date End Date Taking? Authorizing Provider   amitriptyline (Elavil) 25 mg tablet Take 1 tablet (25 mg) by mouth as needed at bedtime for sleep. 10/9/24 10/9/25  Emilia Angeles MD   amLODIPine (Norvasc) 10 mg tablet Take 1 tablet (10 mg) by mouth once daily. 10/1/24   Emilia Angeles MD   ASPIRIN LOW-STRENGTH ORAL Take 81 mg by mouth every other day.    Historical Provider, MD   cholecalciferol (Vitamin D-3) 25 MCG (1000 UT) capsule Take 1 capsule (25 mcg) by mouth once daily. 6/27/22   Historical Provider, MD   escitalopram (Lexapro) 10 mg tablet Take 0.5 tablets (5 mg) by mouth once daily. 6/13/24   Emilia Angeles MD   exemestane (Aromasin) 25 mg tablet Take 1 tablet (25 mg total) by mouth once daily.  Take after a meal.  Try to take at the same time each day. 7/16/24 7/16/25  Clarence Shore MD   gabapentin (Neurontin) 300 mg capsule Take 1 capsule (300 mg) by mouth every 8 hours for 14 days. 8/1/24 8/15/24  Shannon Morrison PA-C   hydrocortisone 2.5 % cream Hydrocortisone 2.5 % External Cream  Quantity: 56   Refills: 0  Start: 9-Aug-2021 8/9/21   Historical Provider, MD   levothyroxine (Synthroid, Levoxyl) 25 mcg tablet Take 1 tablet (25 mcg) by mouth once daily in the morning. 10/1/24   Emilia Angeles MD   losartan (Cozaar) 100 mg tablet Take 1 tablet (100 mg) by mouth once daily. 8/17/24   Emilia Angeles MD   polyethylene glycol (GoLYTELY) 236-22.74-6.74 -5.86 gram solution Drink 1/2 starting at 6 pm the night before your procedure then drink the 2nd 1/2 5 hours before procedure arrival time  Patient not taking: Reported on 10/22/2024 10/21/24   Chance Armas MD   propranolol (Inderal) 10 mg tablet Take 1 tablet (10 mg) by mouth 2 times a day.  Patient taking differently: Take 1 tablet (10 mg) by mouth once daily. 10/1/24   Emilia Angeles MD   rizatriptan MLT (Maxalt-MLT) 10 mg  disintegrating tablet Take 1 tablet (10 mg) by mouth 1 time if needed for migraine. May repeat once in 2 hours if needed (Maximum 2 tablets in 24 hours) 5/26/23   Elizabeth Mijares,    rosuvastatin (Crestor) 20 mg tablet Take 0.5 tablets (10 mg) by mouth once daily. 10/9/24   Emilia Angeles MD        PAT ROS     DASI Risk Score    No data to display       Caprini DVT Assessment    No data to display       Modified Frailty Index    No data to display       CHADS2 Stroke Risk  Current as of yesterday        N/A 3 to 100%: High Risk   2 to < 3%: Medium Risk   0 to < 2%: Low Risk     Last Change: N/A          This score determines the patient's risk of having a stroke if the patient has atrial fibrillation.        This score is not applicable to this patient. Components are not calculated.          Revised Cardiac Risk Index    No data to display       Apfel Simplified Score    No data to display       Risk Analysis Index Results This Encounter    No data found in the last 10 encounters.       Prodigy: High Risk  Total Score: 8              Prodigy Age Score           ARISCAT Score for Postoperative Pulmonary Complications    No data to display       Flynn Perioperative Risk for Myocardial Infarction or Cardiac Arrest (CHEN)    No data to display         Nurse Plan of Action:     RN screening call complete.  Reviewed allergies, medications and pharmacy, medical, surgical and social history with patient.  Chart updated.

## 2024-12-06 NOTE — CPM/PAT NURSE NOTE
CPM/PAT Nurse Note      Name: Scott Leung (Scott BAYLEE Xochitl)  /Age: 1965/59 y.o.       Past Medical History:   Diagnosis Date    Arthritis     Cardiology follow-up encounter 2022    Alexander Carrillo MD  f/u PRN    Depression     HTN (hypertension)     Hyperlipidemia     Hypothyroidism     Insomnia     Low vitamin D level     on daily supplement    Malignant neoplasm of central portion of right female breast, unspecified estrogen receptor status     Plan: Right Breast Implant Replacement; Left Breast Mammaplasty Augmentation with Implant; Right Breast Implant Removal 24    Migraines     Obesity     Open wound of breast with complication, right, sequela     s/p Right Breast Capsulectomy; Replacement of Tissue Expander with Permanent Implant -Right 24    Primary localized osteoarthrosis of left lower leg     Scoliosis     Sensorineural hearing loss (SNHL) of right ear with restricted hearing of left ear     Situational anxiety     TMJ arthropathy        Past Surgical History:   Procedure Laterality Date    BREAST CAPSULECTOMY Right 2024    Right Breast Capsulectomy; Replacement of Tissue Expander with Permanent Implant -Right    INNER EAR SURGERY      KNEE ARTHROSCOPY W/ DEBRIDEMENT      MASTECTOMY W/ SENTINEL NODE BIOPSY Right 06/10/2024    with tissue expander    NASAL ENDOSCOPY  2018    OVARIAN CYST REMOVAL      TUBAL LIGATION      also had uterine ablation    ULNAR NERVE TRANSPOSITION      WISDOM TOOTH EXTRACTION         Patient  reports that she is not currently sexually active and has had partner(s) who are male. She reports using the following method of birth control/protection: Female Sterilization.    Family History   Problem Relation Name Age of Onset    Atrial fibrillation Mother Megan     Other (htn) Mother Megan     Multiple myeloma Mother Megan     Peripheral vascular disease Mother Megan     Other (thyroid disorder) Mother Megan     Arthritis Mother Megan     Liver cancer Father       Atrial fibrillation Sister Tammy     Other (crest) Sister Tammy     Other (pulmonary htn) Sister Tammy     Other (pulmonary occlusive disease) Sister Tammy     Colonic polyp Sister Tammy     Peripheral vascular disease Sister Tammy     Other (thyroid disorder) Sister Tammy     Colon cancer Sister Tammy         detected abnormal cells and had surgery and was told family members should be screened every 5 years    Atrial fibrillation Brother Jad     Diabetes Brother Jad     Other (cabg) Brother Jad     Peripheral vascular disease Brother Jad     Atrial fibrillation Brother Kenneth     Heart disease Brother Kenneth     Atrial fibrillation Brother Sudheer     Diabetes Brother Sudheer     Heart disease Brother Sudheer     Stroke Brother Sudheer     Other (CREST) Other         Allergies   Allergen Reactions    Latex Rash    Tetanus Vaccines And Toxoid Fever       Prior to Admission medications    Medication Sig Start Date End Date Taking? Authorizing Provider   amitriptyline (Elavil) 25 mg tablet Take 1 tablet (25 mg) by mouth as needed at bedtime for sleep. 10/9/24 10/9/25  Emilia Angeles MD   amLODIPine (Norvasc) 10 mg tablet Take 1 tablet (10 mg) by mouth once daily. 10/1/24   Emilia Angeles MD   ASPIRIN LOW-STRENGTH ORAL Take 81 mg by mouth every other day.    Historical Provider, MD   cholecalciferol (Vitamin D-3) 25 MCG (1000 UT) capsule Take 1 capsule (25 mcg) by mouth once daily. 6/27/22   Historical Provider, MD   escitalopram (Lexapro) 10 mg tablet Take 0.5 tablets (5 mg) by mouth once daily. 6/13/24   Emilia Angeles MD   exemestane (Aromasin) 25 mg tablet Take 1 tablet (25 mg total) by mouth once daily.  Take after a meal.  Try to take at the same time each day. 7/16/24 7/16/25  Clarence Shore MD   gabapentin (Neurontin) 300 mg capsule Take 1 capsule (300 mg) by mouth every 8 hours for 14 days. 8/1/24 8/15/24  Shannon Morrison PA-C   hydrocortisone 2.5 % cream Hydrocortisone 2.5 % External Cream  Quantity: 56   Refills: 0   Start: 9-Aug-2021 8/9/21   Historical Provider, MD   levothyroxine (Synthroid, Levoxyl) 25 mcg tablet Take 1 tablet (25 mcg) by mouth once daily in the morning. 10/1/24   Emilia Angeles MD   losartan (Cozaar) 100 mg tablet Take 1 tablet (100 mg) by mouth once daily. 8/17/24   Emilia Angeles MD   polyethylene glycol (GoLYTELY) 236-22.74-6.74 -5.86 gram solution Drink 1/2 starting at 6 pm the night before your procedure then drink the 2nd 1/2 5 hours before procedure arrival time  Patient not taking: Reported on 10/22/2024 10/21/24   Chnace Armas MD   propranolol (Inderal) 10 mg tablet Take 1 tablet (10 mg) by mouth 2 times a day.  Patient taking differently: Take 1 tablet (10 mg) by mouth once daily. 10/1/24   Emilia Angeles MD   rizatriptan MLT (Maxalt-MLT) 10 mg disintegrating tablet Take 1 tablet (10 mg) by mouth 1 time if needed for migraine. May repeat once in 2 hours if needed (Maximum 2 tablets in 24 hours) 5/26/23   Elizabeth Mijares,    rosuvastatin (Crestor) 20 mg tablet Take 0.5 tablets (10 mg) by mouth once daily. 10/9/24   Emilia Angeles MD        PAT ROS     DASI Risk Score    No data to display       Caprini DVT Assessment    No data to display       Modified Frailty Index    No data to display       CHADS2 Stroke Risk  Current as of yesterday        N/A 3 to 100%: High Risk   2 to < 3%: Medium Risk   0 to < 2%: Low Risk     Last Change: N/A          This score determines the patient's risk of having a stroke if the patient has atrial fibrillation.        This score is not applicable to this patient. Components are not calculated.          Revised Cardiac Risk Index    No data to display       Apfel Simplified Score    No data to display       Risk Analysis Index Results This Encounter    No data found in the last 10 encounters.       Prodigy: High Risk  Total Score: 8              Prodigy Age Score           ARISCAT Score for Postoperative Pulmonary Complications    No data to display       Flynn Perioperative  Risk for Myocardial Infarction or Cardiac Arrest (CHEN)    No data to display         Nurse Plan of Action:   RN screening call complete.  Reviewed allergies, medications and pharmacy, medical, surgical and social history with patient.  Chart updated.

## 2024-12-13 ENCOUNTER — DOCUMENTATION (OUTPATIENT)
Dept: PREADMISSION TESTING | Facility: HOSPITAL | Age: 59
End: 2024-12-13

## 2024-12-13 ENCOUNTER — PRE-ADMISSION TESTING (OUTPATIENT)
Dept: PREADMISSION TESTING | Facility: HOSPITAL | Age: 59
End: 2024-12-13
Payer: COMMERCIAL

## 2024-12-13 VITALS
SYSTOLIC BLOOD PRESSURE: 156 MMHG | TEMPERATURE: 98.6 F | WEIGHT: 229.28 LBS | HEIGHT: 67 IN | OXYGEN SATURATION: 98 % | RESPIRATION RATE: 16 BRPM | BODY MASS INDEX: 35.99 KG/M2 | DIASTOLIC BLOOD PRESSURE: 89 MMHG | HEART RATE: 86 BPM

## 2024-12-13 DIAGNOSIS — Z01.818 PREOP TESTING: Primary | ICD-10-CM

## 2024-12-13 PROCEDURE — 99204 OFFICE O/P NEW MOD 45 MIN: CPT | Performed by: PHYSICIAN ASSISTANT

## 2024-12-13 PROCEDURE — 87081 CULTURE SCREEN ONLY: CPT | Mod: AHULAB | Performed by: PHYSICIAN ASSISTANT

## 2024-12-13 RX ORDER — CHLORHEXIDINE GLUCONATE ORAL RINSE 1.2 MG/ML
SOLUTION DENTAL
Qty: 473 ML | Refills: 0 | Status: SHIPPED | OUTPATIENT
Start: 2024-12-13

## 2024-12-13 ASSESSMENT — ENCOUNTER SYMPTOMS
HEMATOLOGIC/LYMPHATIC NEGATIVE: 1
NEUROLOGICAL NEGATIVE: 1
PSYCHIATRIC NEGATIVE: 1
EYES NEGATIVE: 1
CARDIOVASCULAR NEGATIVE: 1
ENDOCRINE NEGATIVE: 1
RESPIRATORY NEGATIVE: 1
ALLERGIC/IMMUNOLOGIC NEGATIVE: 1
CONSTITUTIONAL NEGATIVE: 1
MUSCULOSKELETAL NEGATIVE: 1
GASTROINTESTINAL NEGATIVE: 1

## 2024-12-13 NOTE — CPM/PAT NURSE NOTE
CPM/PAT Nurse Note      Name: Scott Leung (Scott BAYLEE Xochitl)  /Age: 1965/59 y.o.       Past Medical History:   Diagnosis Date    Arthritis     Cardiology follow-up encounter 2022    Alexander Carrillo MD  f/u PRN    Depression     HTN (hypertension)     Hyperlipidemia     Hypothyroidism     Insomnia     Low vitamin D level     on daily supplement    Malignant neoplasm of central portion of right female breast, unspecified estrogen receptor status     Plan: Right Breast Implant Replacement; Left Breast Mammaplasty Augmentation with Implant; Right Breast Implant Removal 24    Migraines     Obesity     Open wound of breast with complication, right, sequela     s/p Right Breast Capsulectomy; Replacement of Tissue Expander with Permanent Implant -Right 24    Primary localized osteoarthrosis of left lower leg     Scoliosis     Sensorineural hearing loss (SNHL) of right ear with restricted hearing of left ear     Situational anxiety     TMJ arthropathy        Past Surgical History:   Procedure Laterality Date    BREAST CAPSULECTOMY Right 2024    Right Breast Capsulectomy; Replacement of Tissue Expander with Permanent Implant -Right    INNER EAR SURGERY      KNEE ARTHROSCOPY W/ DEBRIDEMENT      MASTECTOMY W/ SENTINEL NODE BIOPSY Right 06/10/2024    with tissue expander    NASAL ENDOSCOPY  2018    OVARIAN CYST REMOVAL      TUBAL LIGATION      also had uterine ablation    ULNAR NERVE TRANSPOSITION      WISDOM TOOTH EXTRACTION         Patient  reports that she is not currently sexually active and has had partner(s) who are male. She reports using the following method of birth control/protection: Female Sterilization.    Family History   Problem Relation Name Age of Onset    Atrial fibrillation Mother Megan     Other (htn) Mother Megan     Multiple myeloma Mother Megan     Peripheral vascular disease Mother Megan     Other (thyroid disorder) Mother Megan     Arthritis Mother Megan     Liver cancer Father       Atrial fibrillation Sister Tammy     Other (crest) Sister Tammy     Other (pulmonary htn) Sister Tammy     Other (pulmonary occlusive disease) Sister Tammy     Colonic polyp Sister Tammy     Peripheral vascular disease Sister Tammy     Other (thyroid disorder) Sister Tammy     Colon cancer Sister Tammy         detected abnormal cells and had surgery and was told family members should be screened every 5 years    Atrial fibrillation Brother Jad     Diabetes Brother Jad     Other (cabg) Brother Jad     Peripheral vascular disease Brother Jad     Atrial fibrillation Brother Kenneth     Heart disease Brother Kenneth     Atrial fibrillation Brother Sudheer     Diabetes Brother Sudheer     Heart disease Brother Sudheer     Stroke Brother Sudheer     Other (CREST) Other         Allergies   Allergen Reactions    Latex Rash    Tetanus Vaccines And Toxoid Fever       Prior to Admission medications    Medication Sig Start Date End Date Taking? Authorizing Provider   amitriptyline (Elavil) 25 mg tablet Take 1 tablet (25 mg) by mouth as needed at bedtime for sleep. 10/9/24 10/9/25  Emilia Angeles MD   amLODIPine (Norvasc) 10 mg tablet Take 1 tablet (10 mg) by mouth once daily. 10/1/24   Emilia Angeles MD   ASPIRIN LOW-STRENGTH ORAL Take 81 mg by mouth every other day.    Historical Provider, MD   cholecalciferol (Vitamin D-3) 25 MCG (1000 UT) capsule Take 1 capsule (25 mcg) by mouth once daily. 6/27/22   Historical Provider, MD   escitalopram (Lexapro) 10 mg tablet Take 0.5 tablets (5 mg) by mouth once daily. 6/13/24   Emilia Angeles MD   exemestane (Aromasin) 25 mg tablet Take 1 tablet (25 mg total) by mouth once daily.  Take after a meal.  Try to take at the same time each day. 7/16/24 7/16/25  Clarence Shore MD   gabapentin (Neurontin) 300 mg capsule Take 1 capsule (300 mg) by mouth every 8 hours for 14 days.  Patient not taking: Reported on 12/13/2024 8/1/24 8/15/24  Shannon Morrison PA-C   hydrocortisone 2.5 % cream Hydrocortisone 2.5 %  External Cream  Quantity: 56   Refills: 0  Start: 9-Aug-2021 8/9/21   Historical Provider, MD   levothyroxine (Synthroid, Levoxyl) 25 mcg tablet Take 1 tablet (25 mcg) by mouth once daily in the morning. 10/1/24   Emilia Angeles MD   losartan (Cozaar) 100 mg tablet Take 1 tablet (100 mg) by mouth once daily. 8/17/24   Emilia Angeles MD   polyethylene glycol (GoLYTELY) 236-22.74-6.74 -5.86 gram solution Drink 1/2 starting at 6 pm the night before your procedure then drink the 2nd 1/2 5 hours before procedure arrival time  Patient not taking: Reported on 12/13/2024 10/21/24   Chance Armas MD   propranolol (Inderal) 10 mg tablet Take 1 tablet (10 mg) by mouth 2 times a day.  Patient taking differently: Take 1 tablet (10 mg) by mouth once daily. 10/1/24   Emilia Angeles MD   rizatriptan MLT (Maxalt-MLT) 10 mg disintegrating tablet Take 1 tablet (10 mg) by mouth 1 time if needed for migraine. May repeat once in 2 hours if needed (Maximum 2 tablets in 24 hours) 5/26/23   Elizabeth Mijares DO   rosuvastatin (Crestor) 20 mg tablet Take 0.5 tablets (10 mg) by mouth once daily. 10/9/24   Emilia Angeles MD        PAT ROS     DASI Risk Score    No data to display       Caprini DVT Assessment    No data to display       Modified Frailty Index    No data to display       CHADS2 Stroke Risk  Current as of 10 minutes ago        N/A 3 to 100%: High Risk   2 to < 3%: Medium Risk   0 to < 2%: Low Risk     Last Change: N/A          This score determines the patient's risk of having a stroke if the patient has atrial fibrillation.        This score is not applicable to this patient. Components are not calculated.          Revised Cardiac Risk Index    No data to display       Apfel Simplified Score    No data to display       Risk Analysis Index Results This Encounter    No data found in the last 10 encounters.       Prodigy: High Risk  Total Score: 8              Prodigy Age Score           ARISCAT Score for Postoperative Pulmonary Complications     No data to display       Flynn Perioperative Risk for Myocardial Infarction or Cardiac Arrest (CHEN)    No data to display         Nurse Plan of Action:       After Visit Summary (AVS) reviewed and patient verbalized good understanding of medications and NPO instructions.  Pre-op infection prevention measures:  CHG showers and mouthwash reviewed, understanding voiced.  CHG soap given and patient verbalized need to pick CHG mouthwash at their preferred local pharmacy.

## 2024-12-13 NOTE — PREPROCEDURE INSTRUCTIONS
Medication List            Accurate as of December 13, 2024  8:24 AM. Always use your most recent med list.                amitriptyline 25 mg tablet  Commonly known as: Elavil  Take 1 tablet (25 mg) by mouth as needed at bedtime for sleep.  Notes to patient: Continue night prior to surgery     amLODIPine 10 mg tablet  Commonly known as: Norvasc  Take 1 tablet (10 mg) by mouth once daily.  Medication Adjustments for Surgery: Take on the morning of surgery     ASPIRIN LOW-STRENGTH ORAL  Medication Adjustments for Surgery: Take/Use as prescribed     cholecalciferol 25 MCG (1000 UT) capsule  Commonly known as: Vitamin D-3  Medication Adjustments for Surgery: Do Not take on the morning of surgery     escitalopram 10 mg tablet  Commonly known as: Lexapro  Take 0.5 tablets (5 mg) by mouth once daily.  Medication Adjustments for Surgery: Take/Use as prescribed     exemestane 25 mg tablet  Commonly known as: Aromasin  Take 1 tablet (25 mg total) by mouth once daily.  Take after a meal.  Try to take at the same time each day.  Medication Adjustments for Surgery: Take/Use as prescribed     gabapentin 300 mg capsule  Commonly known as: Neurontin  Take 1 capsule (300 mg) by mouth every 8 hours for 14 days.  Medication Adjustments for Surgery: Take/Use as prescribed     hydrocortisone 2.5 % cream  Medication Adjustments for Surgery: Do Not take on the morning of surgery     levothyroxine 25 mcg tablet  Commonly known as: Synthroid, Levoxyl  Take 1 tablet (25 mcg) by mouth once daily in the morning.  Medication Adjustments for Surgery: Take on the morning of surgery     losartan 100 mg tablet  Commonly known as: Cozaar  Take 1 tablet (100 mg) by mouth once daily.  Medication Adjustments for Surgery: Take last dose 1 day (24 hours) before surgery     polyethylene glycol 236-22.74-6.74 -5.86 gram solution  Commonly known as: GoLYTELY  Drink 1/2 starting at 6 pm the night before your procedure then drink the 2nd 1/2 5 hours  before procedure arrival time  Medication Adjustments for Surgery: Do Not take on the morning of surgery     propranolol 10 mg tablet  Commonly known as: Inderal  Take 1 tablet (10 mg) by mouth 2 times a day.  Medication Adjustments for Surgery: Take on the morning of surgery     rizatriptan MLT 10 mg disintegrating tablet  Commonly known as: Maxalt-MLT  Take 1 tablet (10 mg) by mouth 1 time if needed for migraine. May repeat once in 2 hours if needed (Maximum 2 tablets in 24 hours)  Medication Adjustments for Surgery: Take last dose 1 day (24 hours) before surgery     rosuvastatin 20 mg tablet  Commonly known as: Crestor  Take 0.5 tablets (10 mg) by mouth once daily.  Medication Adjustments for Surgery: Take/Use as prescribed                 Concerning above medication instructions - If medication is normally taken at night continue normal schedule - do not take night prior and morning of surgery.     CONTACT SURGEON'S OFFICE IF YOU DEVELOP:  * Fever = 100.4 F   * New respiratory symptoms (e.g. cough, shortness of breath, respiratory distress, sore throat)  * Recent loss of taste or smell  *Flu like symptoms such as headache, fatigue or gastrointestinal symptoms  * You develop any open sores, shingles, burning or painful urination   AND/OR:  * You no longer wish to have the surgery.  * Any other personal circumstances change that may lead to the need to cancel or defer this surgery.  *You were admitted to any hospital within one week of your planned procedure.    SMOKING:  *Quitting smoking can make a huge difference to your health and recovery from surgery.    *If you need help with quitting, call 4-696-QUIT-NOW.    THE DAY OF SURGERY:  *Do not eat any food after midnight the night before surgery/procedure.   *YOU MUST DRINK 14 oz drink clear liquids (i.e. water, black coffee/tea, (no milk or cream) apple juice, and electrolyte drinks (Gatorade)  2 hours before your instructed arrival time to the hospital.  *You  may chew gum until  2 hours before your surgery/procedure.    SURGICAL TIME  *You will be contacted between 2 p.m. and 6 p.m. the business day before your surgery with your arrival time.  *If you haven't received a call by 6pm, call [phone].  *Scheduled surgery times may change and you will be notified if this occurs-check your personal voicemail for any updates.    ON THE MORNING OF SURGERY:  *Wear comfortable, loose fitting clothing.   *Do not use moisturizers, creams, lotions or perfume.  *All jewelry and valuables should be left at home.  *Prosthetic devices such as contact lenses, hearing aids, dentures, eyelash extensions, hairpins and body piercing must be removed before surgery.    BRING WITH YOU:  *Photo ID and insurance card  *Current list of medicines and allergies  *Pacemaker/Defibrillator/Heart stent cards  *CPAP machine and mask  *Slings/splints/crutches  *Copy of your complete Advanced Directive/DHPOA-if applicable  *Neurostimulator implant remote    PARKING AND ARRIVAL:  *Check in at the Main Entrance desk and let them know you are here for surgery.  *You will be directed to the 2nd floor surgical waiting area.    AFTER OUTPATIENT SURGERY:  *A responsible adult MUST accompany you at the time of discharge and stay with you for 24 hours after your surgery.  *You may NOT drive yourself home after surgery.  *You may use a taxi or ride sharing service ([employer], Uber) to return home ONLY if you are accompanied by a friend or family member.  *Instructions for resuming your medications will be provided by your surgeon.      Home Preoperative Antibacterial Shower     What is a home preoperative antibacterial shower?  This shower is a way of cleaning the skin with a germ killing soap before surgery.  The soap contains chlorhexidine, commonly known as CHG.  CHG is a soap for your skin with germ killing ability.  Let your doctor know if you are allergic to chlorhexidine.    Why do I need to take a preoperative  antibacterial shower?  Skin is not sterile.  It is best to try to make your skin as free of germs as possible before surgery.  Proper cleansing with a germ killing soap before surgery can lower the number of germs on your skin.  This helps to reduce the risk of infection at the surgical site.  Following the instructions listed below will help you prepare your skin for surgery.      How do I use the CHG skin cleanser?  Steps:  Begin using your CHG soap five days before your scheduled surgery on ________________________.    Days 1-4 Shower before bed:  Wash your face and genitals with your normal soap and rinse.    Wash and rinse your hair using the CHG soap. Rinse completely, do not condition your   Hair.          3.    Apply the CHG soap to a clean wet washcloth.  Turn the water off or move away                From the water spray to avoid premature rinsing of the CHG soap as you are applying.     4.   Lather your entire body from the neck down.  Do not use on your face or genitals.   Pay special attention to the area(s) where your incision(s) will be located unless they are on your face.  Avoid scrubbing your skin too hard.  The important point is to have the CHG soap sit on your skin for 3 minutes.    When the 3 minutes are up, turn on the water and rinse the CHG soap off your body completely.   Pat yourself dry with a clean, freshly-laundered towel.  Dress in clean, freshly laundered night clothes.    Be sure to sleep with clean, freshly laundered sheets.  Day 5:  Last shower is the morning before surgery: Follow above Instructions.    NOTE:    *Hair extensions should be removed    *Keep CHG soap out of eyes and ear canals   *DO NOT wash with regular soap on your body after you have used the CHG        soap solution  *DO NOT apply powders, lotions, or perfume.  *Deodorant may be used days 1-4, BUT NOT the day of surgery    Who should I contact if I have any questions regarding the use of CHG soap?  Call the  [facility], Preadmission Testing at [phone] if you have any questions.              Patient Information: Pre-Operative Infection Prevention Measures     Why did I have my nose, under my arms and groin swabbed?  The purpose of the swab is to identify Staphylococcus aureus inside your nose or on your skin.  The swab was sent to the laboratory for culture.  A positive swab/culture for Staphylococcus aureus is called colonization or carriage.      What is Staphylococcus aureus?  Staphylococcus aureus, also known as “staph”, is a germ found on the skin or in the nose of healthy people.  Sometimes Staphylococcus aureus can get into the body and cause an infection.  This can be minor (such as pimples, boils or other skin problems).  It might also be serious (such as blood infection, pneumonia or a surgical site infection).    What is Staphylococcus aureus colonization or carriage?  Colonization or carriage means that a person has the germ but is not sick from it.  These bacteria can be spread on the hands or when breathing or sneezing.    How is Staphylococcus aureus spread?  It is most often spread by close contact with a person or item that carries it.    What happens if my culture is positive for Staphylococcus aureus?  Your doctor/medical team will use this information to guide any antibiotic treatment which may be necessary.  Regardless of the culture results, we will clean the inside of your nose with a betadine swab just before you have your surgery.      Will I get an infection if I have Staphylococcus aureus in my nose or on my skin?  Anyone can get an infection with Staphylococcus aureus.  However, the best way to reduce your risk of infection is to follow the instructions provided to you for the use of your CHG soap and dental rinse.        Who should I contact if I have any questions?  Call the [facility], Preadmission Testing at [phone] if  you have any questions.           Patient Information: Oral/Dental Rinse  **This is a prescription; pick it up at your preferred local pharmacy **  What is oral/dental rinse?   It is a mouthwash. It is a way of cleaning the mouth with a germ killing solution before your surgery.  The solution contains chlorhexidine, commonly known as CHG.   It is used inside the mouth to kill a bacteria known as Staphylococcus aureus.  Let your doctor know if you are allergic to Chlorhexidine.    Why do I need to use CHG oral/dental rinse?  The CHG oral/dental rinse helps to kill a bacteria in your mouth known a Staphylococcus aureus.     This reduces the risk of infection at the surgical site.      Using your CHG oral/dental rinse  STEPS:  Use your CHG oral/dental rinse after you brush your teeth the night before (at bedtime) and the morning of your surgery.  Follow all directions on your prescription label.    Use the cap on the container to measure 15ml (fill cap to fill line)  Swish (gargle if you can) the mouthwash in your mouth for at least 30 seconds, (do not to swallow) spit out  After you use your CHG rinse, do not rinse your mouth with water, drink or eat.  Please refer to prescription label for the appropriate time to resume oral intake  Dental rinse comes in one size bottle: 473ml ~16oz.  You will have leftover    rinse, discard after this use.    What side effects might I have using the CHG oral/dental rinse?  CHG rinse will stick to plaque on the teeth.  Brush and floss just before use.  Teeth brushing will help avoid staining of plaque during use.    Who should I contact if I have questions about the CHG oral/dental rinse?  Please call [facility], Preadmission Testing at [phone] if you have any questions

## 2024-12-13 NOTE — CPM/PAT H&P
CPM/PAT Evaluation       Name: Scott Leung (Scott Leung)  /Age: 1965/59 y.o.     In-Person       Chief Complaint: Malignant neoplasm of central portion of right female breast, unspecified estrogen receptor status [C50.111]     HPI:  Date of Consult: 24    Referring Provider: Dr. Torres     Surgery, Date, and Length: Right Breast Implant Replacement - Right  Left Breast Mammaplasty Augmentation with Implant - Left  Right Breast Implant Removal - Right; 24; 240 minutes     Scott Leung  is a 59 year-old female  who presents to the [facility] for perioperative risk assessment prior to surgery. She had right breast capsulectomy; replacement of tissue expander with permanent implant on 24. Plan for right implant exchange and left mastopexy with implant.       This note was created in part upon personal review of patient's medical records.      Patient is scheduled to have Right Breast Implant Replacement - Right  Left Breast Mammaplasty Augmentation with Implant - Left  Right Breast Implant Removal - Right     Medical History  Past Medical History:   Diagnosis Date    Arthritis     Breast cancer (Multi) 2024    right mastectomy with no further treatment    Depression     HTN (hypertension)     Hyperlipidemia     Hypothyroidism     Insomnia     Low vitamin D level     on daily supplement    Migraines     Obesity     Primary localized osteoarthrosis of left lower leg     Scoliosis     Sensorineural hearing loss (SNHL) of right ear with restricted hearing of left ear     Situational anxiety     TMJ arthropathy         STOP BANG =  4   (>49yo,BMI>35,htn,snorer)    Caprini =    6  (age,surgery,maligancy,BMI>25)    Surgical History  Past Surgical History:   Procedure Laterality Date    BREAST CAPSULECTOMY Right 2024    Right Breast Capsulectomy; Replacement of Tissue Expander with Permanent Implant -Right    INNER EAR SURGERY Right     KNEE ARTHROSCOPY W/ DEBRIDEMENT Left     KNEE SURGERY  Bilateral     MASTECTOMY W/ SENTINEL NODE BIOPSY Right 06/10/2024    with tissue expander    NASAL ENDOSCOPY  2018    OVARIAN CYST REMOVAL      TUBAL LIGATION      also had uterine ablation    ULNAR NERVE TRANSPOSITION Left     WISDOM TOOTH EXTRACTION  1985             Family history:  Family History   Problem Relation Name Age of Onset    Atrial fibrillation Mother Megan     Other (htn) Mother Megan     Multiple myeloma Mother Megan     Peripheral vascular disease Mother Megan     Other (thyroid disorder) Mother Megan     Arthritis Mother Megan     Liver cancer Father      Atrial fibrillation Sister Tammy     Other (crest) Sister Tammy     Other (pulmonary htn) Sister Tammy     Other (pulmonary occlusive disease) Sister Tammy     Colonic polyp Sister Tammy     Peripheral vascular disease Sister Tammy     Other (thyroid disorder) Sister Tammy     Colon cancer Sister Tammy         detected abnormal cells and had surgery and was told family members should be screened every 5 years    Atrial fibrillation Brother Jad     Diabetes Brother Jad     Other (cabg) Brother Jad     Peripheral vascular disease Brother Jad     Atrial fibrillation Brother Kenneth     Heart disease Brother Kenneth     Atrial fibrillation Brother Sudheer     Diabetes Brother Sudheer     Heart disease Brother Sudheer     Stroke Brother Sudheer     Other (CREST) Other          Social history:  Social History     Socioeconomic History    Marital status:      Spouse name: Not on file    Number of children: Not on file    Years of education: Not on file    Highest education level: Not on file   Occupational History    Not on file   Tobacco Use    Smoking status: Never    Smokeless tobacco: Never   Vaping Use    Vaping status: Never Used   Substance and Sexual Activity    Alcohol use: Yes     Alcohol/week: 6.0 standard drinks of alcohol     Types: 6 Standard drinks or equivalent per week    Drug use: Never    Sexual activity: Not Currently     Partners: Male      "Birth control/protection: Female Sterilization   Other Topics Concern    Not on file   Social History Narrative    Not on file     Social Drivers of Health     Financial Resource Strain: Not on file   Food Insecurity: Not on file   Transportation Needs: Not on file   Physical Activity: Not on file   Stress: Not on file   Social Connections: Not on file   Intimate Partner Violence: Not on file   Housing Stability: Not on file        Current Outpatient Medications   Medication Instructions    amitriptyline (ELAVIL) 25 mg, oral, Nightly PRN    amLODIPine (NORVASC) 10 mg, oral, Daily    ASPIRIN LOW-STRENGTH ORAL 81 mg, Every other day    chlorhexidine (Peridex) 0.12 % solution 15 ml swish and spit for 30 seconds night prior to surgery and morning of surgery    cholecalciferol (VITAMIN D-3) 25 mcg, Daily    escitalopram (LEXAPRO) 5 mg, oral, Daily    exemestane (AROMASIN) 25 mg, oral, Daily, Take after a meal.  Try to take at the same time each day.    gabapentin (NEURONTIN) 300 mg, oral, Every 8 hours    hydrocortisone 2.5 % cream Hydrocortisone 2.5 % External Cream  Quantity: 56   Refills: 0  Start: 9-Aug-2021    levothyroxine (SYNTHROID, LEVOXYL) 25 mcg, oral, Every morning    losartan (COZAAR) 100 mg, oral, Daily    polyethylene glycol (GoLYTELY) 236-22.74-6.74 -5.86 gram solution Drink 1/2 starting at 6 pm the night before your procedure then drink the 2nd 1/2 5 hours before procedure arrival time    propranolol (INDERAL) 10 mg, oral, 2 times daily    rizatriptan MLT (MAXALT-MLT) 10 mg, oral, Once as needed, May repeat once in 2 hours if needed (Maximum 2 tablets in 24 hours)    rosuvastatin (CRESTOR) 10 mg, oral, Daily      Visit Vitals  Pulse 86   Temp 37 °C (98.6 °F)   Resp 16   Ht 1.7 m (5' 6.93\")   Wt 104 kg (229 lb 4.5 oz)   SpO2 98%   BMI 35.99 kg/m²   OB Status Postmenopausal   Smoking Status Never   BSA 2.22 m²        Review of Systems   Constitutional: Negative.    HENT: Negative.     Eyes: Negative.  "   Respiratory: Negative.     Cardiovascular: Negative.         METS 4   stairs / walk / shop Without chest pain / SOB    Gastrointestinal: Negative.    Endocrine: Negative.    Genitourinary: Negative.    Musculoskeletal: Negative.         Right thumb pain and locking    Skin: Negative.    Allergic/Immunologic: Negative.    Neurological: Negative.    Hematological: Negative.    Psychiatric/Behavioral: Negative.          Physical Exam  Vitals reviewed.   Constitutional:       Appearance: Normal appearance.   HENT:      Head: Normocephalic and atraumatic.      Right Ear: External ear normal.      Left Ear: External ear normal.      Nose: Nose normal.      Mouth/Throat:      Pharynx: Oropharynx is clear.   Eyes:      Extraocular Movements: Extraocular movements intact.      Conjunctiva/sclera: Conjunctivae normal.      Pupils: Pupils are equal, round, and reactive to light.   Cardiovascular:      Rate and Rhythm: Normal rate and regular rhythm.      Heart sounds: Normal heart sounds.   Pulmonary:      Effort: Pulmonary effort is normal.      Breath sounds: Normal breath sounds.   Abdominal:      Palpations: Abdomen is soft.   Musculoskeletal:         General: Normal range of motion.      Cervical back: Normal range of motion and neck supple.   Skin:     General: Skin is warm and dry.   Neurological:      General: No focal deficit present.      Mental Status: She is alert and oriented to person, place, and time.   Psychiatric:         Mood and Affect: Mood normal.         Behavior: Behavior normal.          PAT AIRWAY:   Airway:     Mallampati::  II    Neck ROM::  Full      Lab Results   Component Value Date    WBC 7.7 11/26/2024    HGB 14.6 11/26/2024    HCT 43.2 11/26/2024    MCV 90 11/26/2024     11/26/2024      Lab Results   Component Value Date    GLUCOSE 121 (H) 11/26/2024    CALCIUM 10.1 11/26/2024     11/26/2024    K 4.5 11/26/2024    CO2 28 11/26/2024     11/26/2024    BUN 10 11/26/2024     CREATININE 0.94 11/26/2024      Lab Results   Component Value Date    ALT 35 11/26/2024    AST 27 11/26/2024    ALKPHOS 113 11/26/2024    BILITOT 0.7 11/26/2024      Lab Results   Component Value Date    HGBA1C 4.9 11/26/2024        Encounter Date: 10/09/24   ECG 12 lead (Clinic Performed)    Narrative    EKG shows normal sinus rhythm, no ST segment abnormalities.      RCRI  0  , 3.9 % Risk of MACE    Cardiac  HTN- losartan HOLD 24 hours prior to surgery             amlodipine Continue DOS   HLD-rosuvastatin Continue DOS     Neuro  Migraines - propranolol Continue DOS      Endocrinology  Hypothyroid- levothyroxine Continue DOS     Breast cancer- exemestane Continue DOS     Hematology       Patient instructed to ambulate as soon as possible postoperatively to decrease thromboembolic risk.      Initiate mechanical DVT prophylaxis as soon as possible and initiate chemical prophylaxis when deemed safe from a bleeding standpoint post surgery.       VTE prophylaxis per surgical team       Tests ordered in PAT: mrsa  LABS REVIEWED from 11/26/24: unremarkable     Risk assessment complete.  Patient is scheduled for a intermediate surgical risk procedure.        Preoperative medication instructions were provided and reviewed with the patient.  Any additional testing or evaluation was explained to the patient.  Nothing by mouth instructions were discussed and patient's questions were answered prior to conclusion to this encounter.  Patient verbalized understanding of preoperative instructions given in preadmission testing; discharge instructions available in EMR.    This note was dictated by a speech recognition.  Minor errors may have been detected in a speech recognition.

## 2024-12-15 LAB — STAPHYLOCOCCUS SPEC CULT: ABNORMAL

## 2024-12-16 ENCOUNTER — APPOINTMENT (OUTPATIENT)
Dept: GASTROENTEROLOGY | Facility: EXTERNAL LOCATION | Age: 59
End: 2024-12-16
Payer: COMMERCIAL

## 2024-12-18 ENCOUNTER — TELEPHONE (OUTPATIENT)
Dept: PLASTIC SURGERY | Facility: CLINIC | Age: 59
End: 2024-12-18
Payer: COMMERCIAL

## 2024-12-18 NOTE — TELEPHONE ENCOUNTER
Outgoing call to patient regarding MRSA result. Pt advised to swab nostrils twice daily with Bactroban.

## 2024-12-30 ENCOUNTER — APPOINTMENT (OUTPATIENT)
Dept: PLASTIC SURGERY | Facility: CLINIC | Age: 59
End: 2024-12-30
Payer: COMMERCIAL

## 2025-01-06 ENCOUNTER — APPOINTMENT (OUTPATIENT)
Dept: GASTROENTEROLOGY | Facility: EXTERNAL LOCATION | Age: 60
End: 2025-01-06
Payer: COMMERCIAL

## 2025-01-09 NOTE — PROGRESS NOTES
Patient ID: [id number]     Chief Complaint: No chief complaint on file.        HPI:      Disclaimer:  This note is prepared using voice recognition software and as such is likely to have errors despite attempts at proofreading. Please contact me for questions.     Virgie Light is a 57 y.o. female here today for the following      Acute Issues-  Diabetes mellitus type 2 With hyperglycemia  On Mounjaro and is able to tolerate it good   Would like to stick to the same dose     Obesity  Body mass index is 44.02 kg/m².  Goal BMI <30.  Exercise 5 times a week for 30 minutes per day.  Avoid soda, simple sugars, excessive rice, potatoes or bread. Limit fast foods and fried foods.  Choose complex carbs in moderation (example: green vegetables, beans, oatmeal). Eat plenty of fresh fruits and vegetables with lean meats daily.  Do not skip meals. Eat a balanced portion size.  Avoid fad diets. Consider permanent healthy life style changes.     Moderate episode of recurrent major depressive disorder  Not on medication  PHQ9- 11    Congestive heart failure  Chronic PE  Longstanding atrial fibrillation  CHF-  Well controlled  Sees Hem/Onc- NP Erica Cespedes  Sees Dr. Billingsley at [facility]   On fursemide, lisinopril, Elliquis x BID    Hypertension   Reports asymptomatic but blood pressure elevated at home    -------------------------------------    Acid reflux    Current use of long term anticoagulation    Diabetes mellitus    Hyperlipidemia    Hypertension    Moderate episode of recurrent major depressive disorder    Moderate persistent asthma, uncomplicated    Personal history of pulmonary embolism    Undifferentiated connective tissue disease    Unspecified atrial fibrillation        Past Surgical History:   Procedure Laterality Date     SECTION      HERNIA REPAIR      OOPHORECTOMY Left 2013    OVARY SURGERY      TUBAL LIGATION         Review of patient's allergies indicates:   Allergen Reactions    Iodine     Morphine Other  Scott Leung comes in today for a comprehensive physical exam.      Ms. Leung comes in today for comprehensive physical exam.  She has undergone extensive surgeries and treatments for newly diagnosed breast cancer earlier this year.  Thankfully she has not required radiation therapy nor chemotherapy and is on hormonal oral therapies.  She follows closely with her specialists.  She is due for colonoscopy and would like to proceed.  She is scheduled for a bone density scan later in the year.  She declines vaccines at this time.  She is having trouble sleeping, understandably so.  She has tried melatonin with minimal benefit.  She does have a history of migraines, so considering amitriptyline may be beneficial for prophylaxis as well.  Blood pressure remains well-managed.  She is amenable to updating comprehensive labs, she plans to return when fasting.  She denies any dizziness, chest pain or palpitations, shortness of breath nor cough, nausea, vomiting, nor changes in bowel nor bladder habits.  She believes she is overdue for pelvic exam but declines today, plans to defer to a future visit.  She does have bony pain from the breast cancer treatment.  She has put on some weight because of the inactivity but she is trying to manage getting more active and hopes that this will help.        Review of Systems   Constitutional:  Positive for activity change and fatigue. Negative for chills and fever.   HENT:  Negative for congestion, ear pain, postnasal drip, rhinorrhea, sinus pressure, sinus pain, sneezing, sore throat, tinnitus and voice change.    Eyes:  Negative for discharge, itching and visual disturbance.   Respiratory:  Negative for cough, chest tightness, shortness of breath and wheezing.    Cardiovascular:  Negative for chest pain, palpitations and leg swelling (Occasional end day, compression stockings help).   Gastrointestinal:  Negative for abdominal distention, abdominal pain, constipation, diarrhea, nausea  (See Comments)    Opioids - morphine analogues Other (See Comments)     Makes her feel hot and bad    Shrimp Itching    Theophenyllin Palpitations    Theophylline Palpitations       Current Outpatient Medications   Medication Instructions    ADVAIR DISKUS 250-50 mcg/dose diskus inhaler 2 times daily    albuterol (PROVENTIL/VENTOLIN HFA) 90 mcg/actuation inhaler 2 puffs, 4 times daily PRN    ALPRAZolam (XANAX) 0.25 mg, Oral, Daily PRN    atorvastatin (LIPITOR) 40 mg, Oral, Daily    blood sugar diagnostic Strp 1 each, Misc.(Non-Drug; Combo Route), 2 times daily    blood-glucose meter kit 1 each, Other, 2 times daily, Use as instructed    cholecalciferol (vitamin D3) 50,000 Units, Oral, Weekly    ELIQUIS 5 mg, Oral, 2 times daily    folic acid (FOLVITE) 1,000 mcg, Oral, Daily    furosemide (LASIX) 20 mg, Oral, Daily    lisinopriL 10 mg, Oral, Daily    metFORMIN (GLUCOPHAGE) 500 mg, Oral, With breakfast    omeprazole (PRILOSEC) 40 mg, Oral, Every morning    ONETOUCH DELICA PLUS LANCET 30 gauge Misc        Social History     Socioeconomic History    Marital status: Single   Tobacco Use    Smoking status: Never     Passive exposure: Never    Smokeless tobacco: Never   Substance and Sexual Activity    Alcohol use: Never    Drug use: Never    Sexual activity: Not Currently     Social Drivers of Health     Financial Resource Strain: Low Risk  (7/9/2024)    Overall Financial Resource Strain (CARDIA)     Difficulty of Paying Living Expenses: Not hard at all   Food Insecurity: No Food Insecurity (7/9/2024)    Hunger Vital Sign     Worried About Running Out of Food in the Last Year: Never true     Ran Out of Food in the Last Year: Never true   Physical Activity: Unknown (7/9/2024)    Exercise Vital Sign     Days of Exercise per Week: 2 days   Stress: Stress Concern Present (7/9/2024)    [employer] [place] [employer] - Occupational Stress Questionnaire     Feeling of Stress : Very much   Housing Stability: Unknown  and vomiting.   Endocrine: Negative for cold intolerance, heat intolerance and polyuria.   Genitourinary:  Negative for difficulty urinating, dysuria, frequency, hematuria, pelvic pain, urgency, vaginal bleeding and vaginal discharge.   Musculoskeletal:  Positive for arthralgias and myalgias. Negative for gait problem and neck pain.   Skin:  Negative for color change, pallor and rash.   Allergic/Immunologic: Negative for environmental allergies, food allergies and immunocompromised state.   Neurological:  Negative for dizziness, syncope, weakness, light-headedness, numbness and headaches.   Hematological:  Negative for adenopathy. Does not bruise/bleed easily.   Psychiatric/Behavioral:  Positive for sleep disturbance. Negative for behavioral problems, decreased concentration and dysphoric mood. The patient is not nervous/anxious and is not hyperactive.        Objective   Physical Exam  Constitutional:       General: She is not in acute distress.     Appearance: Normal appearance. She is well-developed. She is not ill-appearing.   HENT:      Head: Normocephalic.      Right Ear: Tympanic membrane, ear canal and external ear normal.      Left Ear: Tympanic membrane, ear canal and external ear normal.      Nose: Nose normal. No congestion.      Mouth/Throat:      Mouth: Mucous membranes are moist.      Pharynx: Oropharynx is clear. No oropharyngeal exudate or posterior oropharyngeal erythema.   Eyes:      General: Lids are normal. No scleral icterus.     Extraocular Movements: Extraocular movements intact.      Conjunctiva/sclera: Conjunctivae normal.      Pupils: Pupils are equal, round, and reactive to light.   Neck:      Vascular: No carotid bruit.   Cardiovascular:      Rate and Rhythm: Normal rate and regular rhythm.      Pulses: Normal pulses.      Heart sounds: Murmur (1/6 SM RUSB) heard.   Pulmonary:      Effort: Pulmonary effort is normal. No respiratory distress.      Breath sounds: No wheezing, rhonchi or  (7/9/2024)    Housing Stability Vital Sign     Unable to Pay for Housing in the Last Year: No        Family History   Problem Relation Name Age of Onset    Ovarian cancer Mother      COPD Mother      Diabetes Mellitus Mother      Hypertension Mother      Cancer Mother      Hypertension Father      Diabetes Mellitus Father      Cancer Father      Dementia Father      Ovarian cancer Sister      No Known Problems Daughter      No Known Problems Maternal Aunt      No Known Problems Maternal Uncle      No Known Problems Paternal Aunt      No Known Problems Paternal Uncle      No Known Problems Maternal Grandmother      No Known Problems Maternal Grandfather      No Known Problems Paternal Grandmother      No Known Problems Paternal Grandfather      No Known Problems Other      BRCA 1/2 Neg Hx          Patient Care Team:  Chelo Mijares MD as PCP - General (Family Medicine)  Kody Saab MD as Consulting Physician (Gastroenterology)     Subjective:     ROS    See HPI for details    Constitutional: Denies Change in appetite. Denies Chills. Denies Fever. Denies Night sweats.  Respiratory: Denies Cough. Denies Shortness of breath. Denies Shortness of breath with exertion. Denies Wheezing.  Cardiovascular: DeniesChest pain at rest. Denies Chest pain with exertion. Denies Irregular heartbeat. Denies Palpitations. Denies Edema.  Gastrointestinal: Denies Abdominal pain. DeniesDiarrhea. Denies Nausea. Denies Vomiting. Denies Hematemesis or Hematochezia.  Genitourinary: Denies Dysuria. Denies Urinary frequency. Denies Urinary urgency. Denies Blood in urine.  Endocrine: Denies Cold intolerance. Denies Excessive thirst. Denies Heat intolerance. Denies Weight loss. Denies Weight gain.  Musculoskeletal: Denies Painful joints. Denies Weakness.  Integumentary: Denies Rash. Denies Itching. Denies Dry skin.  Neurologic: Denies Dizziness. Denies Fainting. Denies Headache.  Psychiatric: Denies Depression. Denies Anxiety. Denies  rales.   Chest:      Comments: Deferred to specialist  Abdominal:      General: Bowel sounds are normal. There is no distension.      Palpations: Abdomen is soft. There is no mass.      Tenderness: There is no abdominal tenderness. There is no guarding or rebound.   Genitourinary:     Comments: Patient defers  Musculoskeletal:         General: No swelling or signs of injury.      Cervical back: Normal range of motion and neck supple. No tenderness.      Right lower leg: No edema.      Left lower leg: No edema.   Lymphadenopathy:      Cervical: No cervical adenopathy.   Skin:     General: Skin is warm and dry.      Coloration: Skin is not pale.      Findings: No bruising or rash.   Neurological:      General: No focal deficit present.      Mental Status: She is alert and oriented to person, place, and time.      Cranial Nerves: No cranial nerve deficit.      Motor: No weakness.      Coordination: Coordination normal.      Gait: Gait normal.   Psychiatric:         Mood and Affect: Mood normal.         Behavior: Behavior normal.         Judgment: Judgment normal.         Assessment/Plan   Full age-appropriate comprehensive physical exam and health care maintenance performed and discussed today.  Routine safety and preventative measures discussed including self breast exam, seatbelt use, no drinking and driving, no texting and driving, abstinence or cessation of tobacco use, routine dental and vision exams, healthy diet and regular exercise.    We will update comprehensive labs and follow-up on results once available.  She plans to return when fasting.  DEXA order in place, scheduled for later this month.  Mammogram followed by specialist.  EKG as above.  Due for colonoscopy.  Requisition placed.  She has completed shingles vaccine series, only 1 dose documented, she is certain that she had both dosing.  Allergic to tetanus vaccine.  Declines flu shot, RSV and COVID boosters at this time.    We will add amitriptyline to  Suicidal/Homicidal ideations.    All Other ROS: Negative except as stated in HPI.  Objective:     Visit Vitals  LMP  (LMP Unknown)       Physical Exam    General: Alert and oriented, Obese, No acute distress.  Respiratory: Clear to auscultation bilaterally; No wheezes, rales or rhonchi,Non-labored respirations, Symmetrical chest wall expansion.  Cardiovascular: Regular rate and rhythm, S1/S2 normal, No murmurs, rubs or gallops.  Gastrointestinal: Soft, Non-tender, Non-distended, Normal bowel sounds, No palpable organomegaly.  Musculoskeletal: Normal range of motion.  Extremities: No clubbing, cyanosis or edema  Neurologic: No focal deficits  Psychiatric: Normal interaction, Coherent speech, Euthymic mood, Appropriate affect   Assessment:       ICD-10-CM ICD-9-CM   1. Class 3 severe obesity due to excess calories with serious comorbidity and body mass index (BMI) of 50.0 to 59.9 in adult  E66.813 278.01    Z68.43 V85.43    E66.01    2. Congestive heart failure, unspecified HF chronicity, unspecified heart failure type  I50.9 428.0   3. Type 2 diabetes mellitus with hyperglycemia, without long-term current use of insulin  E11.65 250.00     790.29   4. Moderate episode of recurrent major depressive disorder  F33.1 296.32   5. Longstanding persistent atrial fibrillation  I48.11 427.31   6. Primary hypertension  I10 401.9   7. Mixed hyperlipidemia  E78.2 272.2   8. Wellness examination  Z00.00 V70.0        Plan:     1. Type 2 diabetes mellitus with hyperglycemia, without long-term current use of insulin  -     metFORMIN (GLUCOPHAGE) 500 MG tablet; Take 1 tablet (500 mg total) by mouth daily with breakfast.  Dispense: 90 tablet; Refill: 3  -     tirzepatide (MOUNJARO) 5 mg/0.5 mL PnIj; Inject 5 mg into the skin every 7 days.  Dispense: 2 mL; Refill: 2  Continue diabetic diet and medications as prescribed    2. Class 3 severe obesity due to excess calories with serious comorbidity and body mass index (BMI) of 50.0 to 59.9  see if this helps with sleep benefits.  She is to contact us with any questions.  Otherwise, if all remains well, we will see her back in 6 months for brief follow-up.  Problem List Items Addressed This Visit    None  Visit Diagnoses       Routine general medical examination at a health care facility    -  Primary    Relevant Orders    ECG 12 lead (Clinic Performed)             in adult  Body mass index is 44.02 kg/m².  Goal BMI <30.  Exercise 5 times a week for 30 minutes per day.  Avoid soda, simple sugars, excessive rice, potatoes or bread. Limit fast foods and fried foods.  Choose complex carbs in moderation (example: green vegetables, beans, oatmeal). Eat plenty of fresh fruits and vegetables with lean meats daily.  Do not skip meals. Eat a balanced portion size.  Avoid fad diets. Consider permanent healthy life style changes.     3. Congestive heart failure, unspecified HF chronicity, unspecified heart failure type  Keep blood pressure less than 130/80, A1c less than 7, goal LDL <70   Keep scheduled follow up with Cardiology   Continue 35 minutes of brisk walking and Mediterranean diet  Continue Rx as prescribed    4. Moderate episode of recurrent major depressive disorder  -     EScitalopram oxalate (LEXAPRO) 10 MG tablet; Take 1 tablet (10 mg total) by mouth once daily. Take 1/2 tab daily x 1 week then increase to 1 tab  Dispense: 90 tablet; Refill: 3  Start Lexapro as prescribed   ER precautions provided  Denies of suicidal/homicidal symptoms   Start yoga    5. Longstanding persistent atrial fibrillation  Continue Eliquis as prescribed    6. Primary hypertension  Blood pressure at goal in clinic but remains elevated at home   Increase lisinopril to 30 mg  -     lisinopriL 30 MG tablet; Take 1 tablet (10 mg total) by mouth once daily.  Dispense: 90 tablet; Refill: 3  Recommend to continue Rx as prescribed   Keep goal blood pressure less than 130/80  Continue 35 minutes of brisk walking, 5 days in a week  Continue low sodium Diet (DASH Diet - Less than 2 grams of sodium per day).  Recommend to quit smoking if smoking  Maintain healthy weight with goal BMI <25.    7. Mixed hyperlipidemia  Continue Lipitor as prescribed   Continue Mediterranean diet  -     atorvastatin (LIPITOR) 40 MG tablet; Take 1 tablet (40 mg total) by mouth once daily.  Dispense: 90 tablet; Refill: 3    8.  Wellness examination  -     CBC Auto Differential; Future; Expected date: 01/09/2025  -     Comprehensive Metabolic Panel; Future; Expected date: 01/09/2025  -     Lipid Panel; Future; Expected date: 01/09/2025  -     TSH; Future; Expected date: 01/09/2025  -     Hemoglobin A1C; Future; Expected date: 01/09/2025  -     Urinalysis; Future; Expected date: 01/09/2025  -     T4, Free; Future; Expected date: 01/09/2025  -     Vitamin D; Future; Expected date: 01/09/2025  -     Microalbumin/Creatinine Ratio, Urine; Future; Expected date: 01/09/2025  -     Uric Acid; Future; Expected date: 01/09/2025  -     Vitamin B12; Future; Expected date: 01/09/2025  -     Folate; Future; Expected date: 01/09/2025    9. Breast cancer screening by mammogram  -     Mammo Digital Screening Bilat w/ Edgardo; Future; Expected date: 05/14/2025           Medication List with Changes/Refills   Current Medications    ADVAIR DISKUS 250-50 MCG/DOSE DISKUS INHALER    2 (two) times daily.       Start Date: 4/24/2023 End Date: --    ALBUTEROL (PROVENTIL/VENTOLIN HFA) 90 MCG/ACTUATION INHALER    2 puffs 4 (four) times daily as needed.       Start Date: 5/1/2023  End Date: --    ALPRAZOLAM (XANAX) 0.25 MG TABLET    Take 1 tablet (0.25 mg total) by mouth daily as needed for Anxiety.       Start Date: 6/10/2024 End Date: 6/25/2024    ATORVASTATIN (LIPITOR) 40 MG TABLET    Take 1 tablet (40 mg total) by mouth once daily.       Start Date: 5/13/2024 End Date: 5/13/2025    BLOOD SUGAR DIAGNOSTIC STRP    1 each by Misc.(Non-Drug; Combo Route) route 2 (two) times daily.       Start Date: 6/24/2024 End Date: --    BLOOD-GLUCOSE METER KIT    1 each by Other route 2 (two) times daily. Use as instructed       Start Date: 4/30/2024 End Date: --    CHOLECALCIFEROL, VITAMIN D3, 1,250 MCG (50,000 UNIT) CAPSULE    Take 1 capsule (50,000 Units total) by mouth once a week.       Start Date: 9/8/2023  End Date: --    ELIQUIS 5 MG TAB    Take 5 mg by mouth 2 (two) times  daily.       Start Date: 4/20/2023 End Date: --    FOLIC ACID (FOLVITE) 1 MG TABLET    Take 1 tablet (1,000 mcg total) by mouth once daily.       Start Date: 9/8/2023  End Date: --    FUROSEMIDE (LASIX) 20 MG TABLET    Take 1 tablet (20 mg total) by mouth once daily.       Start Date: 7/8/2024  End Date: --    LISINOPRIL 10 MG TABLET    Take 1 tablet (10 mg total) by mouth once daily.       Start Date: 4/1/2024  End Date: --    METFORMIN (GLUCOPHAGE) 500 MG TABLET    Take 1 tablet (500 mg total) by mouth daily with breakfast.       Start Date: 6/24/2024 End Date: --    OMEPRAZOLE (PRILOSEC) 40 MG CAPSULE    Take 1 capsule (40 mg total) by mouth every morning.       Start Date: 5/13/2024 End Date: --    ONETOUCH DELICA PLUS LANCET 30 GAUGE MISC           Start Date: 4/30/2024 End Date: --          Follow up for Annual Wellness.   In addition to their scheduled follow up, the patient has also been instructed to follow up on as needed basis.     Future Appointments   Date Time Provider Department Center   1/17/2025  9:00 AM Erica Cespedes NP [facility]   5/15/2025  9:00 AM Chelo Mijares MD [facility]

## 2025-01-13 DIAGNOSIS — I10 BENIGN ESSENTIAL HYPERTENSION: ICD-10-CM

## 2025-01-13 DIAGNOSIS — E78.5 HYPERLIPIDEMIA, UNSPECIFIED: ICD-10-CM

## 2025-01-13 DIAGNOSIS — E03.9 ACQUIRED HYPOTHYROIDISM: ICD-10-CM

## 2025-01-14 RX ORDER — ROSUVASTATIN CALCIUM 20 MG/1
10 TABLET, COATED ORAL DAILY
Qty: 45 TABLET | Refills: 3 | Status: SHIPPED | OUTPATIENT
Start: 2025-01-14

## 2025-01-14 RX ORDER — AMLODIPINE BESYLATE 10 MG/1
10 TABLET ORAL DAILY
Qty: 90 TABLET | Refills: 3 | Status: SHIPPED | OUTPATIENT
Start: 2025-01-14

## 2025-01-14 RX ORDER — LEVOTHYROXINE SODIUM 25 UG/1
25 TABLET ORAL EVERY MORNING
Qty: 90 TABLET | Refills: 3 | Status: SHIPPED | OUTPATIENT
Start: 2025-01-14

## 2025-02-06 ENCOUNTER — CLINICAL SUPPORT (OUTPATIENT)
Dept: PREADMISSION TESTING | Facility: HOSPITAL | Age: 60
End: 2025-02-06
Payer: COMMERCIAL

## 2025-02-06 ENCOUNTER — TELEPHONE (OUTPATIENT)
Dept: PREADMISSION TESTING | Facility: HOSPITAL | Age: 60
End: 2025-02-06
Payer: COMMERCIAL

## 2025-02-06 NOTE — CPM/PAT NURSE NOTE
CPM/PAT Nurse Note      Name: Scott BAYLEE Xochitl (Scott M Xochitl)  /Age: 1965/59 y.o.       Past Medical History:   Diagnosis Date    Arthritis     Breast cancer (Multi) 2024    right mastectomy with no further treatment    Depression     HTN (hypertension)     Hyperlipidemia     Hypothyroidism     Insomnia     Low vitamin D level     on daily supplement    Migraines     Obesity     Primary localized osteoarthrosis of left lower leg     Scoliosis     Sensorineural hearing loss (SNHL) of right ear with restricted hearing of left ear     Situational anxiety     TMJ arthropathy        Past Surgical History:   Procedure Laterality Date    BREAST CAPSULECTOMY Right 2024    Right Breast Capsulectomy; Replacement of Tissue Expander with Permanent Implant -Right    INNER EAR SURGERY Right     KNEE ARTHROSCOPY W/ DEBRIDEMENT Left     KNEE SURGERY Bilateral     MASTECTOMY W/ SENTINEL NODE BIOPSY Right 06/10/2024    with tissue expander    NASAL ENDOSCOPY  2018    OVARIAN CYST REMOVAL      TUBAL LIGATION      also had uterine ablation    ULNAR NERVE TRANSPOSITION Left     WISDOM TOOTH EXTRACTION         Patient  reports that she is not currently sexually active and has had partner(s) who are male. She reports using the following method of birth control/protection: Female Sterilization.    Family History   Problem Relation Name Age of Onset    Atrial fibrillation Mother Megan     Other (htn) Mother Megan     Multiple myeloma Mother Megan     Peripheral vascular disease Mother Megan     Other (thyroid disorder) Mother Megan     Arthritis Mother Megan     Liver cancer Father      Atrial fibrillation Sister Tammy     Other (crest) Sister Tammy     Other (pulmonary htn) Sister Tammy     Other (pulmonary occlusive disease) Sister Tammy     Colonic polyp Sister Tammy     Peripheral vascular disease Sister Tammy     Other (thyroid disorder) Sister Tammy     Colon cancer Sister Tammy         detected abnormal cells and had surgery and  was told family members should be screened every 5 years    Atrial fibrillation Brother Jad     Diabetes Brother Jad     Other (cabg) Brother Jad     Peripheral vascular disease Brother Jad     Atrial fibrillation Brother Kenneth     Heart disease Brother Kenneth     Atrial fibrillation Brother Sudheer     Diabetes Brother Sudheer     Heart disease Brother Sudheer     Stroke Brother Sudheer     Other (CREST) Other         Allergies   Allergen Reactions    Latex Rash    Tetanus Vaccines And Toxoid Fever       Prior to Admission medications    Medication Sig Start Date End Date Taking? Authorizing Provider   amitriptyline (Elavil) 25 mg tablet Take 1 tablet (25 mg) by mouth as needed at bedtime for sleep. 10/9/24 10/9/25  Emilia Angeles MD   amLODIPine (Norvasc) 10 mg tablet Take 1 tablet (10 mg) by mouth once daily. 1/14/25   Emilia Angeles MD   ASPIRIN LOW-STRENGTH ORAL Take 81 mg by mouth every other day.    Historical Provider, MD   chlorhexidine (Peridex) 0.12 % solution 15 ml swish and spit for 30 seconds night prior to surgery and morning of surgery 12/13/24   Jennifer Garcias PA-C   cholecalciferol (Vitamin D-3) 25 MCG (1000 UT) capsule Take 1 capsule (25 mcg) by mouth once daily. 6/27/22   Historical Provider, MD   escitalopram (Lexapro) 10 mg tablet Take 0.5 tablets (5 mg) by mouth once daily. 6/13/24   Emilia Angeles MD   exemestane (Aromasin) 25 mg tablet Take 1 tablet (25 mg total) by mouth once daily.  Take after a meal.  Try to take at the same time each day. 7/16/24 7/16/25  Clarence Shore MD   gabapentin (Neurontin) 300 mg capsule Take 1 capsule (300 mg) by mouth every 8 hours for 14 days.  Patient not taking: Reported on 12/13/2024 8/1/24 8/15/24  Shannon Morrison PA-C   hydrocortisone 2.5 % cream Hydrocortisone 2.5 % External Cream  Quantity: 56   Refills: 0  Start: 9-Aug-2021 8/9/21   Historical Provider, MD   levothyroxine (Synthroid, Levoxyl) 25 mcg tablet Take 1 tablet (25 mcg) by mouth once daily in the  morning. 1/14/25   Emilia Angeles MD   losartan (Cozaar) 100 mg tablet Take 1 tablet (100 mg) by mouth once daily. 8/17/24   Emilia Angeles MD   polyethylene glycol (GoLYTELY) 236-22.74-6.74 -5.86 gram solution Drink 1/2 starting at 6 pm the night before your procedure then drink the 2nd 1/2 5 hours before procedure arrival time  Patient not taking: Reported on 12/13/2024 10/21/24   Chance Armas MD   propranolol (Inderal) 10 mg tablet Take 1 tablet (10 mg) by mouth 2 times a day.  Patient taking differently: Take 1 tablet (10 mg) by mouth once daily. 10/1/24   Emilia Angeles MD   rizatriptan MLT (Maxalt-MLT) 10 mg disintegrating tablet Take 1 tablet (10 mg) by mouth 1 time if needed for migraine. May repeat once in 2 hours if needed (Maximum 2 tablets in 24 hours) 5/26/23   Elizabeth Mijares,    rosuvastatin (Crestor) 20 mg tablet Take 0.5 tablets (10 mg) by mouth once daily. 1/14/25   Emilia Angeles MD        PAT ROS     DASI Risk Score    No data to display       Caprini DVT Assessment    No data to display       Modified Frailty Index    No data to display       CHADS2 Stroke Risk  Current as of yesterday        N/A 3 to 100%: High Risk   2 to < 3%: Medium Risk   0 to < 2%: Low Risk     Last Change: N/A          This score determines the patient's risk of having a stroke if the patient has atrial fibrillation.        This score is not applicable to this patient. Components are not calculated.          Revised Cardiac Risk Index    No data to display       Apfel Simplified Score    No data to display       Risk Analysis Index Results This Encounter    No data found in the last 10 encounters.       Prodigy: High Risk  Total Score: 8              Prodigy Age Score           ARISCAT Score for Postoperative Pulmonary Complications    No data to display       Flynn Perioperative Risk for Myocardial Infarction or Cardiac Arrest (CHEN)    No data to display         Nurse Plan of Action: RN screening call complete.  Reviewed  allergies, medications and pharmacy, medical, surgical and social history with patient.  Chart updated.  Reviewed pre-op instructions given at [facility] apt.

## 2025-02-07 ENCOUNTER — ANESTHESIA EVENT (OUTPATIENT)
Dept: OPERATING ROOM | Facility: HOSPITAL | Age: 60
End: 2025-02-07
Payer: COMMERCIAL

## 2025-02-10 ENCOUNTER — HOSPITAL ENCOUNTER (OUTPATIENT)
Facility: HOSPITAL | Age: 60
Setting detail: OUTPATIENT SURGERY
Discharge: HOME | End: 2025-02-10
Attending: SURGERY | Admitting: SURGERY
Payer: COMMERCIAL

## 2025-02-10 ENCOUNTER — PHARMACY VISIT (OUTPATIENT)
Dept: PHARMACY | Facility: CLINIC | Age: 60
End: 2025-02-10
Payer: COMMERCIAL

## 2025-02-10 ENCOUNTER — ANESTHESIA (OUTPATIENT)
Dept: OPERATING ROOM | Facility: HOSPITAL | Age: 60
End: 2025-02-10
Payer: COMMERCIAL

## 2025-02-10 VITALS
HEART RATE: 68 BPM | BODY MASS INDEX: 36.85 KG/M2 | WEIGHT: 229.28 LBS | OXYGEN SATURATION: 99 % | TEMPERATURE: 97.5 F | HEIGHT: 66 IN | DIASTOLIC BLOOD PRESSURE: 74 MMHG | RESPIRATION RATE: 14 BRPM | SYSTOLIC BLOOD PRESSURE: 134 MMHG

## 2025-02-10 DIAGNOSIS — G89.18 POST-OP PAIN: Primary | ICD-10-CM

## 2025-02-10 DIAGNOSIS — Z17.0 MALIGNANT NEOPLASM OF CENTRAL PORTION OF RIGHT BREAST IN FEMALE, ESTROGEN RECEPTOR POSITIVE: ICD-10-CM

## 2025-02-10 DIAGNOSIS — C50.111 MALIGNANT NEOPLASM OF CENTRAL PORTION OF RIGHT BREAST IN FEMALE, ESTROGEN RECEPTOR POSITIVE: ICD-10-CM

## 2025-02-10 DIAGNOSIS — C50.111 MALIGNANT NEOPLASM OF CENTRAL PORTION OF RIGHT FEMALE BREAST, UNSPECIFIED ESTROGEN RECEPTOR STATUS: ICD-10-CM

## 2025-02-10 PROCEDURE — 2500000004 HC RX 250 GENERAL PHARMACY W/ HCPCS (ALT 636 FOR OP/ED): Performed by: SURGERY

## 2025-02-10 PROCEDURE — 2500000005 HC RX 250 GENERAL PHARMACY W/O HCPCS: Performed by: SURGERY

## 2025-02-10 PROCEDURE — 3600000004 HC OR TIME - INITIAL BASE CHARGE - PROCEDURE LEVEL FOUR: Performed by: SURGERY

## 2025-02-10 PROCEDURE — 7100000002 HC RECOVERY ROOM TIME - EACH INCREMENTAL 1 MINUTE: Performed by: SURGERY

## 2025-02-10 PROCEDURE — 88304 TISSUE EXAM BY PATHOLOGIST: CPT | Mod: TC,AHULAB | Performed by: SURGERY

## 2025-02-10 PROCEDURE — 2720000007 HC OR 272 NO HCPCS: Performed by: SURGERY

## 2025-02-10 PROCEDURE — 2500000004 HC RX 250 GENERAL PHARMACY W/ HCPCS (ALT 636 FOR OP/ED): Mod: JW | Performed by: NURSE ANESTHETIST, CERTIFIED REGISTERED

## 2025-02-10 PROCEDURE — 19342 INSJ/RPLCMT BRST IMPLT SEP D: CPT

## 2025-02-10 PROCEDURE — 7100000009 HC PHASE TWO TIME - INITIAL BASE CHARGE: Performed by: SURGERY

## 2025-02-10 PROCEDURE — 19342 INSJ/RPLCMT BRST IMPLT SEP D: CPT | Performed by: SURGERY

## 2025-02-10 PROCEDURE — A19316 PR SUSPENSION OF BREAST: Performed by: NURSE ANESTHETIST, CERTIFIED REGISTERED

## 2025-02-10 PROCEDURE — 3700000002 HC GENERAL ANESTHESIA TIME - EACH INCREMENTAL 1 MINUTE: Performed by: SURGERY

## 2025-02-10 PROCEDURE — 2500000001 HC RX 250 WO HCPCS SELF ADMINISTERED DRUGS (ALT 637 FOR MEDICARE OP): Performed by: SURGERY

## 2025-02-10 PROCEDURE — 7100000001 HC RECOVERY ROOM TIME - INITIAL BASE CHARGE: Performed by: SURGERY

## 2025-02-10 PROCEDURE — 7100000010 HC PHASE TWO TIME - EACH INCREMENTAL 1 MINUTE: Performed by: SURGERY

## 2025-02-10 PROCEDURE — 3600000009 HC OR TIME - EACH INCREMENTAL 1 MINUTE - PROCEDURE LEVEL FOUR: Performed by: SURGERY

## 2025-02-10 PROCEDURE — C1789 PROSTHESIS, BREAST, IMP: HCPCS | Performed by: SURGERY

## 2025-02-10 PROCEDURE — 19316 MASTOPEXY: CPT

## 2025-02-10 PROCEDURE — 2780000003 HC OR 278 NO HCPCS: Performed by: SURGERY

## 2025-02-10 PROCEDURE — 19316 MASTOPEXY: CPT | Performed by: SURGERY

## 2025-02-10 PROCEDURE — 2500000001 HC RX 250 WO HCPCS SELF ADMINISTERED DRUGS (ALT 637 FOR MEDICARE OP): Performed by: ANESTHESIOLOGY

## 2025-02-10 PROCEDURE — A4649 SURGICAL SUPPLIES: HCPCS | Performed by: SURGERY

## 2025-02-10 PROCEDURE — 3700000001 HC GENERAL ANESTHESIA TIME - INITIAL BASE CHARGE: Performed by: SURGERY

## 2025-02-10 PROCEDURE — RXMED WILLOW AMBULATORY MEDICATION CHARGE

## 2025-02-10 DEVICE — IMPLANTABLE DEVICE: Type: IMPLANTABLE DEVICE | Site: BREAST | Status: NON-FUNCTIONAL

## 2025-02-10 DEVICE — IMPLANTABLE DEVICE: Type: IMPLANTABLE DEVICE | Site: BREAST | Status: FUNCTIONAL

## 2025-02-10 RX ORDER — HYDROMORPHONE HYDROCHLORIDE 1 MG/ML
INJECTION, SOLUTION INTRAMUSCULAR; INTRAVENOUS; SUBCUTANEOUS AS NEEDED
Status: DISCONTINUED | OUTPATIENT
Start: 2025-02-10 | End: 2025-02-10

## 2025-02-10 RX ORDER — PROPOFOL 10 MG/ML
INJECTION, EMULSION INTRAVENOUS AS NEEDED
Status: DISCONTINUED | OUTPATIENT
Start: 2025-02-10 | End: 2025-02-10

## 2025-02-10 RX ORDER — ACETAMINOPHEN 650 MG/1
650 SUPPOSITORY RECTAL EVERY 4 HOURS PRN
Status: DISCONTINUED | OUTPATIENT
Start: 2025-02-10 | End: 2025-02-10 | Stop reason: HOSPADM

## 2025-02-10 RX ORDER — ONDANSETRON HYDROCHLORIDE 2 MG/ML
4 INJECTION, SOLUTION INTRAVENOUS ONCE AS NEEDED
Status: DISCONTINUED | OUTPATIENT
Start: 2025-02-10 | End: 2025-02-10 | Stop reason: HOSPADM

## 2025-02-10 RX ORDER — ACETAMINOPHEN 325 MG/1
650 TABLET ORAL EVERY 4 HOURS PRN
Status: DISCONTINUED | OUTPATIENT
Start: 2025-02-10 | End: 2025-02-10 | Stop reason: HOSPADM

## 2025-02-10 RX ORDER — FENTANYL CITRATE 50 UG/ML
INJECTION, SOLUTION INTRAMUSCULAR; INTRAVENOUS AS NEEDED
Status: DISCONTINUED | OUTPATIENT
Start: 2025-02-10 | End: 2025-02-10

## 2025-02-10 RX ORDER — SULFAMETHOXAZOLE AND TRIMETHOPRIM 800; 160 MG/1; MG/1
1 TABLET ORAL 2 TIMES DAILY
Qty: 20 TABLET | Refills: 0 | Status: SHIPPED | OUTPATIENT
Start: 2025-02-10 | End: 2025-02-20

## 2025-02-10 RX ORDER — ONDANSETRON HYDROCHLORIDE 2 MG/ML
INJECTION, SOLUTION INTRAVENOUS AS NEEDED
Status: DISCONTINUED | OUTPATIENT
Start: 2025-02-10 | End: 2025-02-10

## 2025-02-10 RX ORDER — BUPIVACAINE HCL/EPINEPHRINE 0.5-1:200K
VIAL (ML) INJECTION AS NEEDED
Status: DISCONTINUED | OUTPATIENT
Start: 2025-02-10 | End: 2025-02-10 | Stop reason: HOSPADM

## 2025-02-10 RX ORDER — ROCURONIUM BROMIDE 10 MG/ML
INJECTION, SOLUTION INTRAVENOUS AS NEEDED
Status: DISCONTINUED | OUTPATIENT
Start: 2025-02-10 | End: 2025-02-10

## 2025-02-10 RX ORDER — MUPIROCIN 20 MG/G
OINTMENT TOPICAL AS NEEDED
Status: DISCONTINUED | OUTPATIENT
Start: 2025-02-10 | End: 2025-02-10 | Stop reason: HOSPADM

## 2025-02-10 RX ORDER — MEPERIDINE HYDROCHLORIDE 25 MG/ML
12.5 INJECTION INTRAMUSCULAR; INTRAVENOUS; SUBCUTANEOUS EVERY 10 MIN PRN
Status: DISCONTINUED | OUTPATIENT
Start: 2025-02-10 | End: 2025-02-10 | Stop reason: HOSPADM

## 2025-02-10 RX ORDER — ACETAMINOPHEN 500 MG
1000 TABLET ORAL EVERY 6 HOURS PRN
Qty: 30 TABLET | Refills: 0 | Status: SHIPPED | OUTPATIENT
Start: 2025-02-10 | End: 2025-03-12

## 2025-02-10 RX ORDER — DOCUSATE SODIUM 100 MG/1
100 CAPSULE, LIQUID FILLED ORAL 2 TIMES DAILY PRN
Qty: 60 CAPSULE | Refills: 0 | Status: SHIPPED | OUTPATIENT
Start: 2025-02-10 | End: 2025-03-12

## 2025-02-10 RX ORDER — MIDAZOLAM HYDROCHLORIDE 1 MG/ML
INJECTION INTRAMUSCULAR; INTRAVENOUS AS NEEDED
Status: DISCONTINUED | OUTPATIENT
Start: 2025-02-10 | End: 2025-02-10

## 2025-02-10 RX ORDER — ACETAMINOPHEN 325 MG/1
975 TABLET ORAL ONCE
Status: DISCONTINUED | OUTPATIENT
Start: 2025-02-10 | End: 2025-02-10 | Stop reason: HOSPADM

## 2025-02-10 RX ORDER — PHENYLEPHRINE HCL IN 0.9% NACL 1 MG/10 ML
SYRINGE (ML) INTRAVENOUS AS NEEDED
Status: DISCONTINUED | OUTPATIENT
Start: 2025-02-10 | End: 2025-02-10

## 2025-02-10 RX ORDER — LIDOCAINE HYDROCHLORIDE 10 MG/ML
0.1 INJECTION, SOLUTION EPIDURAL; INFILTRATION; INTRACAUDAL; PERINEURAL ONCE
Status: DISCONTINUED | OUTPATIENT
Start: 2025-02-10 | End: 2025-02-10 | Stop reason: HOSPADM

## 2025-02-10 RX ORDER — KETOROLAC TROMETHAMINE 30 MG/ML
INJECTION, SOLUTION INTRAMUSCULAR; INTRAVENOUS AS NEEDED
Status: DISCONTINUED | OUTPATIENT
Start: 2025-02-10 | End: 2025-02-10

## 2025-02-10 RX ORDER — ACETAMINOPHEN 160 MG/5ML
650 SUSPENSION ORAL EVERY 4 HOURS PRN
Status: DISCONTINUED | OUTPATIENT
Start: 2025-02-10 | End: 2025-02-10 | Stop reason: HOSPADM

## 2025-02-10 RX ORDER — CELECOXIB 200 MG/1
200 CAPSULE ORAL 2 TIMES DAILY
Qty: 60 CAPSULE | Refills: 0 | Status: SHIPPED | OUTPATIENT
Start: 2025-02-10 | End: 2025-03-12

## 2025-02-10 RX ORDER — SCOPOLAMINE 1 MG/3D
1 PATCH, EXTENDED RELEASE TRANSDERMAL ONCE
Status: DISCONTINUED | OUTPATIENT
Start: 2025-02-10 | End: 2025-02-10 | Stop reason: HOSPADM

## 2025-02-10 RX ORDER — SODIUM CHLORIDE, SODIUM LACTATE, POTASSIUM CHLORIDE, CALCIUM CHLORIDE 600; 310; 30; 20 MG/100ML; MG/100ML; MG/100ML; MG/100ML
INJECTION, SOLUTION INTRAVENOUS CONTINUOUS PRN
Status: DISCONTINUED | OUTPATIENT
Start: 2025-02-10 | End: 2025-02-10

## 2025-02-10 RX ORDER — LIDOCAINE HCL/PF 100 MG/5ML
SYRINGE (ML) INTRAVENOUS AS NEEDED
Status: DISCONTINUED | OUTPATIENT
Start: 2025-02-10 | End: 2025-02-10

## 2025-02-10 RX ORDER — OXYCODONE HYDROCHLORIDE 5 MG/1
5 TABLET ORAL EVERY 4 HOURS PRN
Status: DISCONTINUED | OUTPATIENT
Start: 2025-02-10 | End: 2025-02-10 | Stop reason: HOSPADM

## 2025-02-10 RX ORDER — CEFAZOLIN 1 G/1
INJECTION, POWDER, FOR SOLUTION INTRAVENOUS AS NEEDED
Status: DISCONTINUED | OUTPATIENT
Start: 2025-02-10 | End: 2025-02-10

## 2025-02-10 RX ORDER — ONDANSETRON 4 MG/1
4 TABLET, FILM COATED ORAL EVERY 8 HOURS PRN
Qty: 9 TABLET | Refills: 0 | Status: SHIPPED | OUTPATIENT
Start: 2025-02-10 | End: 2025-03-12

## 2025-02-10 RX ORDER — GABAPENTIN 300 MG/1
300 CAPSULE ORAL 3 TIMES DAILY
Qty: 90 CAPSULE | Refills: 0 | Status: SHIPPED | OUTPATIENT
Start: 2025-02-10 | End: 2025-03-12

## 2025-02-10 RX ORDER — SODIUM CHLORIDE 0.9 G/100ML
INJECTION, SOLUTION IRRIGATION AS NEEDED
Status: DISCONTINUED | OUTPATIENT
Start: 2025-02-10 | End: 2025-02-10 | Stop reason: HOSPADM

## 2025-02-10 RX ORDER — GABAPENTIN 300 MG/1
600 CAPSULE ORAL ONCE
Status: COMPLETED | OUTPATIENT
Start: 2025-02-10 | End: 2025-02-10

## 2025-02-10 RX ADMIN — HYDROMORPHONE HYDROCHLORIDE 0.3 MG: 1 INJECTION, SOLUTION INTRAMUSCULAR; INTRAVENOUS; SUBCUTANEOUS at 08:33

## 2025-02-10 RX ADMIN — Medication 50 MCG: at 08:40

## 2025-02-10 RX ADMIN — SODIUM CHLORIDE, POTASSIUM CHLORIDE, SODIUM LACTATE AND CALCIUM CHLORIDE: 600; 310; 30; 20 INJECTION, SOLUTION INTRAVENOUS at 07:27

## 2025-02-10 RX ADMIN — CEFAZOLIN 2 G: 1 INJECTION, POWDER, FOR SOLUTION INTRAMUSCULAR; INTRAVENOUS at 07:55

## 2025-02-10 RX ADMIN — HYDROMORPHONE HYDROCHLORIDE 0.1 MG: 1 INJECTION, SOLUTION INTRAMUSCULAR; INTRAVENOUS; SUBCUTANEOUS at 08:46

## 2025-02-10 RX ADMIN — LIDOCAINE HYDROCHLORIDE 100 MG: 20 INJECTION, SOLUTION INTRAVENOUS at 07:42

## 2025-02-10 RX ADMIN — SCOPOLAMINE 1 PATCH: 1.5 PATCH, EXTENDED RELEASE TRANSDERMAL at 06:54

## 2025-02-10 RX ADMIN — SUGAMMADEX 20 MG: 100 INJECTION, SOLUTION INTRAVENOUS at 09:55

## 2025-02-10 RX ADMIN — GABAPENTIN 600 MG: 300 CAPSULE ORAL at 06:54

## 2025-02-10 RX ADMIN — MIDAZOLAM HYDROCHLORIDE 2 MG: 1 INJECTION, SOLUTION INTRAMUSCULAR; INTRAVENOUS at 07:29

## 2025-02-10 RX ADMIN — PROPOFOL 50 MG: 10 INJECTION, EMULSION INTRAVENOUS at 07:43

## 2025-02-10 RX ADMIN — LIDOCAINE HYDROCHLORIDE 100 MG: 20 INJECTION, SOLUTION INTRAVENOUS at 07:38

## 2025-02-10 RX ADMIN — KETOROLAC TROMETHAMINE 30 MG: 30 INJECTION, SOLUTION INTRAMUSCULAR at 09:40

## 2025-02-10 RX ADMIN — ROCURONIUM BROMIDE 20 MG: 10 INJECTION, SOLUTION INTRAVENOUS at 08:05

## 2025-02-10 RX ADMIN — ROCURONIUM BROMIDE 60 MG: 10 INJECTION, SOLUTION INTRAVENOUS at 07:42

## 2025-02-10 RX ADMIN — ROCURONIUM BROMIDE 20 MG: 10 INJECTION, SOLUTION INTRAVENOUS at 09:05

## 2025-02-10 RX ADMIN — FENTANYL CITRATE 100 MCG: 50 INJECTION, SOLUTION INTRAMUSCULAR; INTRAVENOUS at 07:38

## 2025-02-10 RX ADMIN — OXYCODONE HYDROCHLORIDE 5 MG: 5 TABLET ORAL at 11:01

## 2025-02-10 RX ADMIN — DEXAMETHASONE SODIUM PHOSPHATE 10 MG: 4 INJECTION, SOLUTION INTRAMUSCULAR; INTRAVENOUS at 07:59

## 2025-02-10 RX ADMIN — ONDANSETRON 4 MG: 2 INJECTION, SOLUTION INTRAMUSCULAR; INTRAVENOUS at 09:40

## 2025-02-10 RX ADMIN — PROPOFOL 150 MG: 10 INJECTION, EMULSION INTRAVENOUS at 07:41

## 2025-02-10 RX ADMIN — ACETAMINOPHEN 650 MG: 325 TABLET, FILM COATED ORAL at 06:55

## 2025-02-10 SDOH — HEALTH STABILITY: MENTAL HEALTH: CURRENT SMOKER: 0

## 2025-02-10 ASSESSMENT — PAIN - FUNCTIONAL ASSESSMENT
PAIN_FUNCTIONAL_ASSESSMENT: 0-10

## 2025-02-10 ASSESSMENT — PAIN SCALES - GENERAL
PAIN_LEVEL: 3
PAINLEVEL_OUTOF10: 0 - NO PAIN
PAINLEVEL_OUTOF10: 2
PAINLEVEL_OUTOF10: 0 - NO PAIN

## 2025-02-10 ASSESSMENT — PAIN DESCRIPTION - LOCATION: LOCATION: BREAST

## 2025-02-10 ASSESSMENT — PAIN DESCRIPTION - ORIENTATION: ORIENTATION: LEFT;RIGHT

## 2025-02-10 NOTE — ANESTHESIA PREPROCEDURE EVALUATION
Patient: Scott Leung    Procedure Information       Date/Time: 02/10/25 4030    Procedures:       Right Breast Implant Replacement (Right: Breast)      Left Breast Mammaplasty Augmentation with Implant (Left: Breast)      Right Breast Implant Removal (Right: Breast)    Location: [facility] A OR 05 / Virtual [facility] A OR    Surgeons: Danica Torres MD            Relevant Problems   Anesthesia (within normal limits)      Cardiac   (+) Benign essential hypertension   (+) Hyperlipidemia      Pulmonary (within normal limits)      Neuro   (+) Depression with anxiety   (+) Situational anxiety      GI (within normal limits)      /Renal (within normal limits)      Liver (within normal limits)      Endocrine   (+) Hypothyroidism      Hematology (within normal limits)      Musculoskeletal   (+) Lumbar stenosis   (+) Primary localized osteoarthrosis of left lower leg   (+) Primary osteoarthritis of right knee      HEENT   (+) Mixed hearing loss, bilateral   (+) Sensorineural hearing loss (SNHL)   (+) Sensorineural hearing loss (SNHL) of right ear with restricted hearing of left ear      ID (within normal limits)      Skin (within normal limits)      GYN   (+) Malignant neoplasm of central portion of right breast in female, estrogen receptor positive   (+) Malignant neoplasm of central portion of right female breast       Clinical information reviewed:    Allergies      OB Status           NPO Detail:  NPO/Void Status  Carbohydrate Drink Given Prior to Surgery? : N  Date of Last Liquid: 02/10/25  Time of Last Liquid: 0400  Date of Last Solid: 02/09/25  Time of Last Solid: 2230  Last Intake Type: Clear fluids  Time of Last Void: 0639         Physical Exam    Airway  Mallampati: I  TM distance: >3 FB  Neck ROM: full     Cardiovascular - normal exam     Dental - normal exam     Pulmonary - normal exam     Abdominal - normal exam             Anesthesia Plan    History of general anesthesia?: yes  History of complications of general  anesthesia?: no    ASA 3     general     The patient is not a current smoker.  Patient was not previously instructed to abstain from smoking on day of procedure.  Patient did not smoke on day of procedure.    intravenous induction   Anesthetic plan and risks discussed with patient.  Use of blood products discussed with patient who consented to blood products.

## 2025-02-10 NOTE — ANESTHESIA POSTPROCEDURE EVALUATION
Patient: Scott Leung    Procedure Summary       Date: 02/10/25 Room / Location: [facility] A OR 05 / [facility] A OR    Anesthesia Start: 0727 Anesthesia Stop: 1005    Procedures:       Right Breast Implant Replacement (Right: Breast)      Left Breast Mammaplasty Augmentation (Left: Breast)      Right Breast Implant Removal (Right: Breast) Diagnosis:       Malignant neoplasm of central portion of right female breast, unspecified estrogen receptor status      (Malignant neoplasm of central portion of right female breast, unspecified estrogen receptor status [C50.111])    Surgeons: Danica Torres MD Responsible Provider: Rosendo Whyte MD    Anesthesia Type: general ASA Status: 3            Anesthesia Type: general    Vitals Value Taken Time   /67 02/10/25 1046   Temp 36.4 °C (97.5 °F) 02/10/25 1005   Pulse 67 02/10/25 1050   Resp 12 02/10/25 1045   SpO2 96 % 02/10/25 1050   Vitals shown include unfiled device data.    Anesthesia Post Evaluation    Patient location during evaluation: bedside  Patient participation: complete - patient participated  Level of consciousness: awake and alert  Pain score: 3  Pain management: adequate  Airway patency: patent  Cardiovascular status: acceptable  Respiratory status: acceptable  Hydration status: acceptable  Postoperative Nausea and Vomiting: none        No notable events documented.

## 2025-02-10 NOTE — ANESTHESIA PROCEDURE NOTES
Airway  Date/Time: 2/10/2025 7:49 AM  Urgency: elective    Airway not difficult    Staffing  Performed: attending   Authorized by: Rosendo Whyte MD    Performed by: Rosendo Whyte MD  Patient location during procedure: OR    Indications and Patient Condition  Indications for airway management: anesthesia and airway protection  Spontaneous Ventilation: absent  Sedation level: deep  Preoxygenated: yes  Patient position: sniffing  MILS maintained throughout  Mask difficulty assessment: 1 - vent by mask    Final Airway Details  Final airway type: endotracheal airway      Successful airway: ETT  Cuffed: yes   Successful intubation technique: video laryngoscopy  Facilitating devices/methods: intubating stylet and cricoid pressure  Endotracheal tube insertion site: oral  Blade: Irina  Blade size: #4  ETT size (mm): 7.0  Cormack-Lehane Classification: grade IIb - view of arytenoids or posterior of glottis only  Placement verified by: chest auscultation and capnometry   Measured from: lips  ETT to lips (cm): 21  Number of attempts at approach: 3 or more  Ventilation between attempts: BVM    Additional Comments  Smooth IV induction, easy mask ventilations, atraumatic DVL with MAC 3 blade-epiglottis only seen.  Intubation using Tabor Blade 4 per Dr. Whyte requiring significant amount of cricoid pressure to view vocal cords/intubation.  Teeth/lips intact.

## 2025-02-10 NOTE — H&P
HPI:   Scott Leung 59 y.o. female is here for second stage reconstruction which will be exchange of the right breast with a larger implant and a symmetry procedure on the left which will be a mastopexy.  Since she has gained weight, our original plan of also including a small augmentation, may not be necessary or possible, given that the total volume is limited by the envelope of the right side.     Interval changes as of this date:   8/13 She is recovering well overall. Expected postoperative pain. Following activity restrictions and endorses adequate protein intake.  8/22 Doing well overall. Here for evaluation of ELIAS drain. Output <30 ml per day for three consecutive days. She is willing to leave drains in until 8/27.   8/27 Doing well overall. ELIAS drain in place; here for evaluation  9/19 Doing well overall. No major issues.   11/26 Doing well overall, has been doing breast massage. Would like to proceed with next surgery.      MEDICATIONS    Current Medications      Current Outpatient Medications:     amitriptyline (Elavil) 25 mg tablet, Take 1 tablet (25 mg) by mouth as needed at bedtime for sleep., Disp: 30 tablet, Rfl: 1    amLODIPine (Norvasc) 10 mg tablet, Take 1 tablet (10 mg) by mouth once daily., Disp: 90 tablet, Rfl: 0    ASPIRIN LOW-STRENGTH ORAL, Take 81 mg by mouth every other day., Disp: , Rfl:     cholecalciferol (Vitamin D-3) 25 MCG (1000 UT) capsule, Take 1 capsule (25 mcg) by mouth once daily., Disp: , Rfl:     escitalopram (Lexapro) 10 mg tablet, Take 0.5 tablets (5 mg) by mouth once daily., Disp: 45 tablet, Rfl: 3    exemestane (Aromasin) 25 mg tablet, Take 1 tablet (25 mg total) by mouth once daily.  Take after a meal.  Try to take at the same time each day., Disp: 90 tablet, Rfl: 1    gabapentin (Neurontin) 300 mg capsule, Take 1 capsule (300 mg) by mouth every 8 hours for 14 days., Disp: 42 capsule, Rfl: 0    hydrocortisone 2.5 % cream, Hydrocortisone 2.5 % External Cream Quantity: 56   "Refills: 0  Start: 9-Aug-2021, Disp: , Rfl:     levothyroxine (Synthroid, Levoxyl) 25 mcg tablet, Take 1 tablet (25 mcg) by mouth once daily in the morning., Disp: 90 tablet, Rfl: 0    losartan (Cozaar) 100 mg tablet, Take 1 tablet (100 mg) by mouth once daily., Disp: 90 tablet, Rfl: 1    polyethylene glycol (GoLYTELY) 236-22.74-6.74 -5.86 gram solution, Drink 1/2 starting at 6 pm the night before your procedure then drink the 2nd 1/2 5 hours before procedure arrival time (Patient not taking: Reported on 10/22/2024), Disp: 4000 mL, Rfl: 0    propranolol (Inderal) 10 mg tablet, Take 1 tablet (10 mg) by mouth 2 times a day., Disp: 180 tablet, Rfl: 0    rizatriptan MLT (Maxalt-MLT) 10 mg disintegrating tablet, Take 1 tablet (10 mg) by mouth 1 time if needed for migraine. May repeat once in 2 hours if needed (Maximum 2 tablets in 24 hours), Disp: 9 tablet, Rfl: 1    rosuvastatin (Crestor) 20 mg tablet, Take 0.5 tablets (10 mg) by mouth once daily., Disp: 90 tablet, Rfl: 0         OBJECTIVE [x]Expand by Default  Blood pressure 144/80, pulse 87, height 1.702 m (5' 7\"), weight 104 kg (230 lb).        REVIEW OF SYSTEMS:    Constitutional: negative for fevers, chills, unintentional weight loss  HEENT: negative for changes in vision, headaches, changes in hearing, congestion, sore throat  Cardiovascular: negative for chest pain, palpitations  Respiratory: negative for cough, shortness of breath  Gastrointestinal: negative for nausea, vomiting, diarrhea  Genitourinary: negative for dysuria, hematuria  Musculoskeletal: negative for joint swelling or pain  Skin: negative for rashes or lesions  Psych: negative for depression, anxiety  Endocrine: negative for polyuria, polydipsia, cold/heat intolerance  Hem/Lymph: negative for bleeding disorder     PHYSICAL EXAM  General: alert and oriented, no apparent distress     Focused exam of the breasts:  Right: incision C/D/I. Skin well perfused    No signs of cellulitis or seroma.   Step " off at chest wall and implant. Volume too small     Left breast: with ptosis and lack of superior pole volume       Order   Right    Moderate Plus 475 ml   Moderate Plus 425 ml  Moderate Classic 405 ml     Left   Moderate Classic 190 ml, 235 ml, 275 ml        ASSESSMENT/PLAN  Scott Leung 59 y.o. female who had right breast capsulectomy; replacement of tissue expander with permanent implant on 8/5/24 who presents for POV. Will continue massage.  Plan for right implant exchange and left mastopexy with implant on 12/20/24 was rescheduled due to patient getting sick.  Now ready for right implant exchange and left mastopexy with possible implant.        Attending Attestation:  Danica MORFIN MD, personal performed the history, exam, and decision making on this patient.

## 2025-02-10 NOTE — DISCHARGE INSTRUCTIONS
Plastic Surgery Post Discharge Instructions  You were admitted to [facility] for postoperative monitoring and control of pain following surgery on right breast implant exchange and left breast mastopexy with Dr. Torres of plastic surgery. Included below are post-discharge instructions and details regarding follow-up.     Thank you for allowing us to participate in your care and we wish you the best!    Best Regards,  [facility]  Department of Plastic and Reconstructive Surgery    Activity:  Activity as tolerated . No pushing, pulling or lifting objects greater than 5 pounds. Ensure no compression is applied to the breasts.  Please do not apply ice or heat directly to breasts without barrier in place.     You may locally bathe following discharge. Avoid soaking or submerging surgical incisions/sites. Do not shower until your post op appointment.       Local wound care instructions DO NOT remove ace wrap or padding until your follow up appointment.      Continue to monitor breasts for changes in general appearance, color, temperature and turgor. Please additionally monitor for any developing signs of infection which may include increased redness, swelling, fever/chills, green/yellow drainage, or foul odor from surgical sites or wounds. If any signs of infection or changes in breasts appearance are to occur, please immediately contact the plastic surgery office.         Nutrition:  You may resume a regular diet following surgery with increased protein. Ensure that you are drinking an adequate amount of fluids to maintain hydration as well as consuming a diet high in protein and low in sugar.  Shoot for greater 120 g of  protein daily while recovering. You may consider increasing your fiber intake to avoid constipation.    Do not smoke, as smoking delays healing and increases the risk of complications. Also be sure you are not around people that smoke for at least 6  weeks after surgery.  Second hand smoke is just as harmful as if you were to smoke.    Medication Instructions:  You may resume use of your home prescribed mediations as previously directed following discharge from the hospital. If you were taking medications prior to your surgery and they are not listed on your discharge homegoing instructions medications list, consult your MD before you resume these medications.    Some postoperative pain is not unusual. This is usually relieved by taking prescribed or over the counter Acetaminophen/Tylenol, Motrin/ibuprofen. In cases of severe pain, you may use prescribed Celebrix and gabapentin as directed. Severe pain despite administration of pain medication must be reported to your physician.    Remember when taking Acetaminophen, do NOT exceed more than 1000 milligrams (mg) per dose or more than 4000 mg total per day. Taking too much Acetaminophen at one time can damage your liver. The maximum amount of ibuprofen in adults is 800 mg per dose or 3200 mg per day. Call your MD if you have any questions about your medications. To prevent constipation while taking narcotic pain medications, please utilize your prescribed bowel regimen, ensure that you drink plenty of water, eat fiber rich foods (a good source is fruit) and increase activity progressively.    DO NOT drive a car while utilizing narcotic pain medications and until cleared by MD at follow-up appointment. Driving or operating heavy machinery, lawnmowers or power tools while taking opiod/narcotic pain medications may impair your judgement.    Call Physician If:  Contact the plastic surgery office for any questions and/or concerns regarding the surgical incision/site.  1. [phone] if Monday-Friday (8 a.m. - 4:30 p.m.)  2. [phone] and ask for the Plastic Surgery team on call provider if after hours or on weekends      Call your MD or seek immediate medical attention if you experience any of the following  symptoms:  1. Fever of 101.5 (38.5 C) or greater  2. Pain not controlled with prescribed pain medications  3. Uncontrolled nausea and/or vomiting  4. Drainage or swelling around your incisions and/or surgical sites   5. Separation of incisions, or tearing of the incision line  6. Large fluid collection under or around the incision or flap sites   7. Flap discoloration (including darkened appearance  8. Difficulty breathing  9. Swelling, pain, heat and/or redness in your legs and/or calves  10. Inability to tolerate diet/fluid intake    Additional Notes:  Do not remove padding or ACE wrap over breasts until follow up appointment  DO not shower until cleared by surgeon  Prioritize protein intake while recovering from surgery  No pushing, pulling tugging greater then 5 lbs during recovery.    Follow-up/Post Discharge Appointments:  Follow-up care is a key part of your treatment and safety. It is very important that you maintain follow-up care as directed so that your surgical site heals properly and does not lead to problems. Always carry a current medication list with you and bring it to ALL healthcare Provider visits. Be sure to maintain follow up with plastic surgery at your scheduled appointment. If you are unable to keep your appointment, or need to reschedule please contact our office at [phone].

## 2025-02-10 NOTE — OP NOTE
Right Breast Implant Replacement (R), Left Breast Mastopexy for symmetry(R) Operative Note     Date: 2/10/2025  OR Location: [facility] A OR    Name: Scott Leung, : 1965, Age: 59 y.o., MRN: [id number], Sex: female    Diagnosis  Pre-op Diagnosis      * Malignant neoplasm of central portion of right female breast, unspecified estrogen receptor status [C50.111] Post-op Diagnosis     * Malignant neoplasm of central portion of right female breast, unspecified estrogen receptor status [C50.111]     Procedures  Right Breast Implant Exchange   - AR INSJ/RPLCMT BREAST IMPLANT SEP DAY MASTECTOMY   - AR REMOVAL INTACT BREAST IMPLANT   - Right breast capsulotomies    Left breast AR MASTOPEXY []    Surgeons      * Danica Torres - Primary    Resident/Fellow/Other Assistant:  Surgeons and Role:     * Alma Zapata PA-C - SALOMON First Assist    There was no skilled surgical resident assistance available.  The Surgical Assistant was necessary to assist due to the nature of the case and difficulty.  Specifically, the SALOMON was assisted with soft tissue handling, retraction, hemostasis and closure in order to successfully perform and complete the procedure.    Staff:   Circulator: Rosy  Scrub Person: Luzmaria Dumontub Person: Carlos Walton Circulator: Yina    Anesthesia Staff: Anesthesiologist: Rosendo Whyte MD  CRNA: ARACELI Christensen-FABI  C-AA: DIRK Hong    Procedure Summary  Anesthesia: General  ASA: III  Estimated Blood Loss: 10 mL  Intra-op Medications:   Administrations occurring from 0730 to 1130 on 02/10/25:   Medication Name Total Dose   sodium chloride 0.9 % irrigation solution 1,000 mL   BUPivacaine-EPINEPHrine (Marcaine w/EPI) 0.5 %-1:200,000 injection 54 mL   Unable to Find 450 mL   oxygen (O2) therapy 90 L   ceFAZolin (Ancef) vial 1 g 2 g   dexAMETHasone (Decadron) injection 4 mg/mL 10 mg   fentaNYL (Sublimaze) injection 50 mcg/mL 100 mcg   HYDROmorphone (Dilaudid) injection 1  mg/mL 0.4 mg   ketorolac (Toradol) injection 30 mg 30 mg   LR infusion Cannot be calculated   lidocaine (cardiac) injection 2% prefilled syringe 200 mg   ondansetron (Zofran) 2 mg/mL injection 4 mg   phenylephrine 100 mcg/mL syringe 10 mL (prefilled) 50 mcg   propofol (Diprivan) injection 10 mg/mL 200 mg   rocuronium (ZeMuron) 50 mg/5 mL injection 100 mg   sugammadex (Bridion) 200 mg/2 mL injection 20 mg              Anesthesia Record               Intraprocedure I/O Totals          Intake    LR infusion 700.00 mL    Total Intake 700 mL       Output    Est. Blood Loss 10 mL    Total Output 10 mL       Net    Net Volume 690 mL          Specimen:   ID Type Source Tests Collected by Time   1 : RIGHT BREAST IMPLANT Tissue BREAST IMPLANT RIGHT SURGICAL PATHOLOGY EXAM Rosy TINOCO RN 2/10/2025 0834   2 : LEFT BREAST TISSUE Tissue BREAST REDUCTION MAMMOPLASTY LEFT SURGICAL PATHOLOGY EXAM Rosy TINOCO RN 2/10/2025 0932                 Drains and/or Catheters:   [REMOVED] Closed/Suction Drain 1 Inferior;Lateral;Right Chest Bulb 19 Fr. (Removed)       [REMOVED] Closed/Suction Drain 2 Inferior;Right Chest Bulb 19 Fr. (Removed)       Tourniquet Times:         Implants:  Implants       Type Name Action Serial No.      Sizer SIZER, MAMMARY, SILTEX, HIGH PROFILE, COHESIVE, 600CC - [id number] Used, Not Implanted      Breast Sizer MemoryGel Resterilizable Gel Sizer Moderate Profile Plus Used, Not Implanted      Breast Implant BREAST IMPLANT, SILICONE, SM. ROUND HI PROFILE 600CC - [id number] - [id number] Implanted [id number]              Indications: Scott Leung is an 59 y.o. female who is having surgery for Malignant neoplasm of central portion of right female breast, unspecified estrogen receptor status [C50.111].  She has a history of right mastectomy with a prepectoral tissue expander placed.her post operative course was complicated with seroma and near tissue expander extrusion.  She  therefore underwent a washout and  closure over a small implant.  She is now ready for her second stage of her reconstruction which include exchange of the  implant with a larger implant to better fit her chest wall footprint as well as a symmetry procedure with a left mastopexy and augmentation to give superior pole fullness.  However, in the interval, the patient has had some weight gain and her left breast is now larger.  She may not require an implant on there left side for symmetry.    The patient was seen in the preoperative area. The risks, benefits, complications, treatment options, non-operative alternatives, expected recovery and outcomes were discussed with the patient. The risks of the procedure were discussed with her prior to surgery.  These include but are not limited to the risks of anesthesia, infection, bleeding, pain, need for further procedures, hematoma, seroma, wound healing complications, and asymmetry.  The possibilities of reaction to medication, pulmonary aspiration, injury to surrounding structures, bleeding, recurrent infection, the need for additional procedures, failure to diagnose a condition, and creating a complication requiring transfusion or operation were discussed with the patient. The patient concurred with the proposed plan, giving informed consent.  The site of surgery was properly noted/marked if necessary per policy. The patient has been actively warmed in preoperative area. Preoperative antibiotics have been ordered and given within 1 hours of incision. Venous thrombosis prophylaxis have been ordered including bilateral sequential compression devices    Procedure Details: The patient was identified and marked in the upright and standing position.  She was taken to the operative room and placed in the supine position.  A preoperative time was performed identifying the patient, procedeure and laterality.  An atraumatic endotracheal intubation was performed by the anesthesia team.  Her arms were padded and  carefully secured to the arm boards, abducted less than 90 degrees. She was prepped and draped in the usual sterile fashion.     Attention was turned to the right breast.  I made a medial inframammary fold incision and carried the dissection  through the subcutaneous tissue to the capsule which was then opened and the intact 275 ml implant was removed.  I used several gel sizers to assess how much volume and projection the pocket could hold.   I decided to use a 600 cc high profile sizer.   I performed significant capsulotomies in the inferior central and lateral poles combining transverse, barrel stave and  board incisions.  I obtained hemostasis with electrocautery and turned my attention to the Left breast.    I tailored tacked the left breast as a vertical mastopexy and found that she had good volume and shape symmetry and did not require an implant.  I then performed a vertical mastopexy using a superior pedicle, a 38 mm cookie cutter and 5 cm pillars.  The tissue in the inferior medial lateral triangles were dissected off the skin and the inferior border of the pillars and resected.   Once the pedicle was rotated into position the medial and lateral pillars were closed using 2-0 Vicryl.  A small pocket was dissected from underneath him and the inferior breast tissue was advanced superiorly to auto augment the volume of the breast.      54 cc of 0.5% Marcaine with epinephrine was diluted with 50 cc of normal saline and was used to placed chest wall blocks on both side for post operative pain control.  I removed the sizer on the right side.  I once more insured hemostasis.  I washed the pocket with a bottle of Irrisept.  I then exchanged gloves and prepped the right side with Betadine paint and sterile blue towels.  I opened up a 600 cc Tyrone smooth round high profile implant and bathed it with Irrisept.  I used a Lucia funnel to place it into the pocket on the right side.  I then closed the incision  with 3-0 Monocryl in the parenchyma as well as placed as interrupted buried dermals followed by running 4-0 Monocryl subcuticular.    All the incisions were closed with 3-0 Monocryl interrupted buried dermals followed by running 4-0 Monocryl subcuticular.  The incisions were dressed mupirocin, xeroform, kerlix, and a 6 inch ortho Ace wrap.  The patient was then woken, extubated and taken to the recovery room in good condition.    Complications:  None; patient tolerated the procedure well.    Disposition: PACU - hemodynamically stable.  Condition: stable     Attending Attestation: I performed the procedure.    Danica Torres  Phone Number: [phone]

## 2025-02-12 ASSESSMENT — PAIN SCALES - GENERAL: PAINLEVEL_OUTOF10: 2

## 2025-02-13 LAB
LABORATORY COMMENT REPORT: NORMAL
PATH REPORT.FINAL DX SPEC: NORMAL
PATH REPORT.GROSS SPEC: NORMAL
PATH REPORT.RELEVANT HX SPEC: NORMAL
PATH REPORT.TOTAL CANCER: NORMAL

## 2025-02-14 ENCOUNTER — TELEPHONE (OUTPATIENT)
Dept: PLASTIC SURGERY | Facility: CLINIC | Age: 60
End: 2025-02-14
Payer: COMMERCIAL

## 2025-02-14 NOTE — TELEPHONE ENCOUNTER
Outgoing postoperative follow up call. She is doing well overall. Pain is well controlled, Her ACE wrap is in place. She has our office and after hours numbers and will call with any questions or concerns.

## 2025-02-16 NOTE — PROGRESS NOTES
PLASTIC SURGERY CLINIC VISIT  POSTOP BREAST RECONSTRUCTION     Date: 2/18/25  Date of Surgery: 2/10/25  Surgical Procedure: Right breast implant replacement and left breast mastopexy for symmetry        HPI:   Scott Leung 59 y.o. female is here for post-operative appointment for the above procedure(s).      Interval changes as of this date:   2/18 Doing well overall. Tolerating pain well, on going healing.        MEDICATIONS    Current Outpatient Medications:     acetaminophen (Tylenol) 500 mg tablet, Take 2 tablets (1,000 mg) by mouth every 6 hours if needed for mild pain (1 - 3)., Disp: 30 tablet, Rfl: 0    amitriptyline (Elavil) 25 mg tablet, Take 1 tablet (25 mg) by mouth as needed at bedtime for sleep., Disp: 30 tablet, Rfl: 1    amLODIPine (Norvasc) 10 mg tablet, Take 1 tablet (10 mg) by mouth once daily., Disp: 90 tablet, Rfl: 3    ASPIRIN LOW-STRENGTH ORAL, Take 81 mg by mouth every other day., Disp: , Rfl:     celecoxib (CeleBREX) 200 mg capsule, Take 1 capsule (200 mg) by mouth 2 times a day., Disp: 60 capsule, Rfl: 0    chlorhexidine (Peridex) 0.12 % solution, 15 ml swish and spit for 30 seconds night prior to surgery and morning of surgery, Disp: 473 mL, Rfl: 0    cholecalciferol (Vitamin D-3) 25 MCG (1000 UT) capsule, Take 1 capsule (25 mcg) by mouth once daily., Disp: , Rfl:     docusate sodium (Colace) 100 mg capsule, Take 1 capsule (100 mg) by mouth 2 times a day as needed for constipation., Disp: 60 capsule, Rfl: 0    escitalopram (Lexapro) 10 mg tablet, Take 0.5 tablets (5 mg) by mouth once daily., Disp: 45 tablet, Rfl: 3    exemestane (Aromasin) 25 mg tablet, Take 1 tablet (25 mg total) by mouth once daily.  Take after a meal.  Try to take at the same time each day., Disp: 90 tablet, Rfl: 4    gabapentin (Neurontin) 300 mg capsule, Take 1 capsule (300 mg) by mouth 3 times a day., Disp: 90 capsule, Rfl: 0    hydrocortisone 2.5 % cream, , Disp: , Rfl:     levothyroxine (Synthroid, Levoxyl)  25 mcg tablet, Take 1 tablet (25 mcg) by mouth once daily in the morning., Disp: 90 tablet, Rfl: 3    losartan (Cozaar) 100 mg tablet, Take 1 tablet (100 mg) by mouth once daily., Disp: 90 tablet, Rfl: 1    polyethylene glycol (GoLYTELY) 236-22.74-6.74 -5.86 gram solution, Drink 1/2 starting at 6 pm the night before your procedure then drink the 2nd 1/2 5 hours before procedure arrival time, Disp: 4000 mL, Rfl: 0    propranolol (Inderal) 10 mg tablet, Take 1 tablet (10 mg) by mouth 2 times a day. (Patient taking differently: Take 1 tablet (10 mg) by mouth once daily.), Disp: 180 tablet, Rfl: 0    rizatriptan MLT (Maxalt-MLT) 10 mg disintegrating tablet, Take 1 tablet (10 mg) by mouth 1 time if needed for migraine. May repeat once in 2 hours if needed (Maximum 2 tablets in 24 hours), Disp: 9 tablet, Rfl: 1    rosuvastatin (Crestor) 20 mg tablet, Take 0.5 tablets (10 mg) by mouth once daily., Disp: 45 tablet, Rfl: 3    sulfamethoxazole-trimethoprim (Bactrim DS) 800-160 mg tablet, Take 1 tablet by mouth 2 times a day for 10 days., Disp: 20 tablet, Rfl: 0    ondansetron (Zofran) 4 mg tablet, Take 1 tablet (4 mg) by mouth every 8 hours if needed for nausea or vomiting. (Patient not taking: Reported on 2/18/2025), Disp: 9 tablet, Rfl: 0      OBJECTIVE [x]Expand by Default  Blood pressure 127/86, pulse 77, temperature 36.7 °C (98.1 °F), resp. rate 18, SpO2 97%.     REVIEW OF SYSTEMS:    Constitutional: negative for fevers, chills, unintentional weight loss  HEENT: negative for changes in vision, headaches, changes in hearing, congestion, sore throat  Cardiovascular: negative for chest pain, palpitations  Respiratory: negative for cough, shortness of breath  Gastrointestinal: negative for nausea, vomiting, diarrhea  Genitourinary: negative for dysuria, hematuria  Musculoskeletal: negative for joint swelling or pain  Skin: negative for rashes or lesions  Psych: negative for depression, anxiety  Endocrine: negative for  polyuria, polydipsia, cold/heat intolerance  Hem/Lymph: negative for bleeding disorder     PHYSICAL EXAM  General: alert and oriented, no apparent distress    Focused exam of the breasts:  Right: Incision c/d/I 3 mm by 5 mm partial thickness eschar along the vertical incision where she had her ACE wrap.   Left: Incision c/d/i         ASSESSMENT/PLAN  Scott Leung 59 y.o. female who had right breast implant replacement and left breast mastopexy fro symmetry on 2/10/25 who presents for POV.    Ace wrap in place. Removed at this visit.    Continue activity restrictions.   Continue adequate protein intake.    3 by 5 mm partial thickness eschar along the vertical incision where she had her ACE wrap. Duoderm placed and Steri strips applied  Will put her in supportive bra size 2XL     RTC in 2 weeks    Scribe Attestation  By signing my name below, NAVJOT Bashircoral Chen, Brian, attest that this documentation has been prepared under the direction and in the presence of Danica Torres MD. Verbal consent obtained from the patient.      Attending Attestation:  Danica MORFIN MD, personal performed the history, exam, and decision making on this patient.

## 2025-02-18 ENCOUNTER — OFFICE VISIT (OUTPATIENT)
Dept: PLASTIC SURGERY | Facility: CLINIC | Age: 60
End: 2025-02-18
Payer: COMMERCIAL

## 2025-02-18 VITALS
HEART RATE: 77 BPM | RESPIRATION RATE: 18 BRPM | SYSTOLIC BLOOD PRESSURE: 127 MMHG | TEMPERATURE: 98.1 F | DIASTOLIC BLOOD PRESSURE: 86 MMHG | OXYGEN SATURATION: 97 %

## 2025-02-18 DIAGNOSIS — C50.111 MALIGNANT NEOPLASM OF CENTRAL PORTION OF RIGHT BREAST IN FEMALE, ESTROGEN RECEPTOR POSITIVE: ICD-10-CM

## 2025-02-18 DIAGNOSIS — N65.1 BREAST ASYMMETRY BETWEEN NATIVE BREAST AND RECONSTRUCTED BREAST: Primary | ICD-10-CM

## 2025-02-18 DIAGNOSIS — Z17.0 MALIGNANT NEOPLASM OF CENTRAL PORTION OF RIGHT BREAST IN FEMALE, ESTROGEN RECEPTOR POSITIVE: ICD-10-CM

## 2025-02-18 PROCEDURE — 3079F DIAST BP 80-89 MM HG: CPT | Performed by: SURGERY

## 2025-02-18 PROCEDURE — 99211 OFF/OP EST MAY X REQ PHY/QHP: CPT | Performed by: SURGERY

## 2025-02-18 PROCEDURE — 3074F SYST BP LT 130 MM HG: CPT | Performed by: SURGERY

## 2025-02-18 RX ORDER — EXEMESTANE 25 MG/1
25 TABLET ORAL DAILY
Qty: 90 TABLET | Refills: 4 | Status: SHIPPED | OUTPATIENT
Start: 2025-02-18 | End: 2026-02-18

## 2025-02-18 ASSESSMENT — PAIN SCALES - GENERAL: PAINLEVEL_OUTOF10: 5

## 2025-02-25 ENCOUNTER — APPOINTMENT (OUTPATIENT)
Dept: PLASTIC SURGERY | Facility: CLINIC | Age: 60
End: 2025-02-25
Payer: COMMERCIAL

## 2025-02-28 NOTE — PROGRESS NOTES
PLASTIC SURGERY CLINIC VISIT  POSTOP BREAST RECONSTRUCTION     Date: 3/4/25  Date of Surgery: 2/10/25  Surgical Procedure: Right breast implant replacement and left breast mastopexy for symmetry        HPI:   Scott Leung 60 y.o. female is here for post-operative appointment for the above procedure(s).      Interval changes as of this date:   2/18 Doing well overall. Tolerating pain well, on going healing.   3/4: Doing well, continue to heal well.        MEDICATIONS    Current Outpatient Medications:     acetaminophen (Tylenol) 500 mg tablet, Take 2 tablets (1,000 mg) by mouth every 6 hours if needed for mild pain (1 - 3)., Disp: 30 tablet, Rfl: 0    amitriptyline (Elavil) 25 mg tablet, Take 1 tablet (25 mg) by mouth as needed at bedtime for sleep., Disp: 30 tablet, Rfl: 1    amLODIPine (Norvasc) 10 mg tablet, Take 1 tablet (10 mg) by mouth once daily., Disp: 90 tablet, Rfl: 3    ASPIRIN LOW-STRENGTH ORAL, Take 81 mg by mouth every other day., Disp: , Rfl:     celecoxib (CeleBREX) 200 mg capsule, Take 1 capsule (200 mg) by mouth 2 times a day., Disp: 60 capsule, Rfl: 0    chlorhexidine (Peridex) 0.12 % solution, 15 ml swish and spit for 30 seconds night prior to surgery and morning of surgery, Disp: 473 mL, Rfl: 0    cholecalciferol (Vitamin D-3) 25 MCG (1000 UT) capsule, Take 1 capsule (25 mcg) by mouth once daily., Disp: , Rfl:     docusate sodium (Colace) 100 mg capsule, Take 1 capsule (100 mg) by mouth 2 times a day as needed for constipation., Disp: 60 capsule, Rfl: 0    escitalopram (Lexapro) 10 mg tablet, Take 0.5 tablets (5 mg) by mouth once daily., Disp: 45 tablet, Rfl: 3    exemestane (Aromasin) 25 mg tablet, Take 1 tablet (25 mg total) by mouth once daily.  Take after a meal.  Try to take at the same time each day., Disp: 90 tablet, Rfl: 4    gabapentin (Neurontin) 300 mg capsule, Take 1 capsule (300 mg) by mouth 3 times a day., Disp: 90 capsule, Rfl: 0    hydrocortisone 2.5 % cream, , Disp: ,  Rfl:     levothyroxine (Synthroid, Levoxyl) 25 mcg tablet, Take 1 tablet (25 mcg) by mouth once daily in the morning., Disp: 90 tablet, Rfl: 3    losartan (Cozaar) 100 mg tablet, Take 1 tablet (100 mg) by mouth once daily., Disp: 90 tablet, Rfl: 1    polyethylene glycol (GoLYTELY) 236-22.74-6.74 -5.86 gram solution, Drink 1/2 starting at 6 pm the night before your procedure then drink the 2nd 1/2 5 hours before procedure arrival time, Disp: 4000 mL, Rfl: 0    rizatriptan MLT (Maxalt-MLT) 10 mg disintegrating tablet, Take 1 tablet (10 mg) by mouth 1 time if needed for migraine. May repeat once in 2 hours if needed (Maximum 2 tablets in 24 hours), Disp: 9 tablet, Rfl: 1    rosuvastatin (Crestor) 20 mg tablet, Take 0.5 tablets (10 mg) by mouth once daily., Disp: 45 tablet, Rfl: 3    ondansetron (Zofran) 4 mg tablet, Take 1 tablet (4 mg) by mouth every 8 hours if needed for nausea or vomiting. (Patient not taking: Reported on 3/4/2025), Disp: 9 tablet, Rfl: 0    propranolol (Inderal) 10 mg tablet, Take 1 tablet (10 mg) by mouth 2 times a day. (Patient not taking: Reported on 3/4/2025), Disp: 180 tablet, Rfl: 0      OBJECTIVE [x]Expand by Default  Blood pressure 131/77, pulse 69, temperature 36.4 °C (97.5 °F), temperature source Temporal, weight 106 kg (234 lb 0.3 oz), SpO2 98%.     REVIEW OF SYSTEMS:    Constitutional: negative for fevers, chills, unintentional weight loss  HEENT: negative for changes in vision, headaches, changes in hearing, congestion, sore throat  Cardiovascular: negative for chest pain, palpitations  Respiratory: negative for cough, shortness of breath  Gastrointestinal: negative for nausea, vomiting, diarrhea  Genitourinary: negative for dysuria, hematuria  Musculoskeletal: negative for joint swelling or pain  Skin: negative for rashes or lesions  Psych: negative for depression, anxiety  Endocrine: negative for polyuria, polydipsia, cold/heat intolerance  Hem/Lymph: negative for bleeding disorder      PHYSICAL EXAM  General: alert and oriented, no apparent distress    Focused exam of the breasts:  Right: Incision c/d/I, healed well. Nipple is alive   Left: Incision c/d/i Wound is healing well, healed beneath the surface, need to epithelialize. Effacement of the lateral inframammary fold     ASSESSMENT/PLAN  Scott Leung 60 y.o. female who had right breast implant replacement and left breast mastopexy fro symmetry on 2/10/25 who presents for POV.      Continue activity restrictions, she can drive to short distances.   Continue adequate protein intake.    3 by 5 mm partial thickness eschar along the vertical incision where she had her ACE wrap. Duoderm placed and Steri strips applied  Will put her in supportive bra size 2XL  Left breast; wound is healing well, healed beneath the surface, need to epithelialize, effacement of the lateral inframammary fold, I will reinforce it with steri strips and allow her to heal down.  R breast has healed well.      RTC in 1 month    Scribe Attestation  By signing my name below, Chas MORFIN, Brian, attest that this documentation has been prepared under the direction and in the presence of Danica Torres MD. Verbal consent obtained from the patient.      Attending Attestation:  Danica MORFIN MD, personal performed the history, exam, and decision making on this patient.

## 2025-03-04 ENCOUNTER — APPOINTMENT (OUTPATIENT)
Dept: PLASTIC SURGERY | Facility: CLINIC | Age: 60
End: 2025-03-04
Payer: COMMERCIAL

## 2025-03-04 VITALS
TEMPERATURE: 97.5 F | OXYGEN SATURATION: 98 % | HEART RATE: 69 BPM | SYSTOLIC BLOOD PRESSURE: 131 MMHG | DIASTOLIC BLOOD PRESSURE: 77 MMHG | WEIGHT: 234.02 LBS | BODY MASS INDEX: 37.77 KG/M2

## 2025-03-04 DIAGNOSIS — C50.111 MALIGNANT NEOPLASM OF CENTRAL PORTION OF RIGHT BREAST IN FEMALE, ESTROGEN RECEPTOR POSITIVE: ICD-10-CM

## 2025-03-04 DIAGNOSIS — N65.1 BREAST ASYMMETRY BETWEEN NATIVE BREAST AND RECONSTRUCTED BREAST: Primary | ICD-10-CM

## 2025-03-04 DIAGNOSIS — Z17.0 MALIGNANT NEOPLASM OF CENTRAL PORTION OF RIGHT BREAST IN FEMALE, ESTROGEN RECEPTOR POSITIVE: ICD-10-CM

## 2025-03-04 PROCEDURE — 3078F DIAST BP <80 MM HG: CPT | Performed by: SURGERY

## 2025-03-04 PROCEDURE — 3075F SYST BP GE 130 - 139MM HG: CPT | Performed by: SURGERY

## 2025-03-04 PROCEDURE — 99211 OFF/OP EST MAY X REQ PHY/QHP: CPT | Performed by: SURGERY

## 2025-03-04 ASSESSMENT — PAIN SCALES - GENERAL: PAINLEVEL_OUTOF10: 0-NO PAIN

## 2025-03-04 ASSESSMENT — PATIENT HEALTH QUESTIONNAIRE - PHQ9
1. LITTLE INTEREST OR PLEASURE IN DOING THINGS: NOT AT ALL
2. FEELING DOWN, DEPRESSED OR HOPELESS: NOT AT ALL
SUM OF ALL RESPONSES TO PHQ9 QUESTIONS 1 & 2: 0

## 2025-04-01 ENCOUNTER — APPOINTMENT (OUTPATIENT)
Dept: RADIOLOGY | Facility: CLINIC | Age: 60
End: 2025-04-01
Payer: COMMERCIAL

## 2025-04-01 ENCOUNTER — APPOINTMENT (OUTPATIENT)
Dept: SURGICAL ONCOLOGY | Facility: CLINIC | Age: 60
End: 2025-04-01
Payer: COMMERCIAL

## 2025-04-10 NOTE — PROGRESS NOTES
PLASTIC SURGERY CLINIC VISIT  POSTOP BREAST RECONSTRUCTION     Date: 4/15/25  Date of Surgery: 2/10/25  Surgical Procedure: Right breast implant replacement and left breast mastopexy for symmetry        HPI:   Scott Leung 60 y.o. female is here for post-operative appointment for the above procedure(s).      Interval changes as of this date:   2/18 Doing well overall. Tolerating pain well, on going healing.   3/4: Doing well, continue to heal well.   4/15 Doing well overall. She notes that since surgery she has been experiencing hoarseness of her voice. She denies any other cold/flu symptomology.      MEDICATIONS    Current Outpatient Medications:     amitriptyline (Elavil) 25 mg tablet, Take 1 tablet (25 mg) by mouth as needed at bedtime for sleep., Disp: 30 tablet, Rfl: 1    amLODIPine (Norvasc) 10 mg tablet, Take 1 tablet (10 mg) by mouth once daily., Disp: 90 tablet, Rfl: 3    ASPIRIN LOW-STRENGTH ORAL, Take 81 mg by mouth every other day., Disp: , Rfl:     chlorhexidine (Peridex) 0.12 % solution, 15 ml swish and spit for 30 seconds night prior to surgery and morning of surgery, Disp: 473 mL, Rfl: 0    cholecalciferol (Vitamin D-3) 25 MCG (1000 UT) capsule, Take 1 capsule (25 mcg) by mouth once daily., Disp: , Rfl:     escitalopram (Lexapro) 10 mg tablet, Take 0.5 tablets (5 mg) by mouth once daily., Disp: 45 tablet, Rfl: 3    exemestane (Aromasin) 25 mg tablet, Take 1 tablet (25 mg total) by mouth once daily.  Take after a meal.  Try to take at the same time each day., Disp: 90 tablet, Rfl: 4    gabapentin (Neurontin) 300 mg capsule, Take 1 capsule (300 mg) by mouth 3 times a day., Disp: 90 capsule, Rfl: 0    hydrocortisone 2.5 % cream, , Disp: , Rfl:     levothyroxine (Synthroid, Levoxyl) 25 mcg tablet, Take 1 tablet (25 mcg) by mouth once daily in the morning., Disp: 90 tablet, Rfl: 3    losartan (Cozaar) 100 mg tablet, Take 1 tablet (100 mg) by mouth once daily., Disp: 90 tablet, Rfl: 1     polyethylene glycol (GoLYTELY) 236-22.74-6.74 -5.86 gram solution, Drink 1/2 starting at 6 pm the night before your procedure then drink the 2nd 1/2 5 hours before procedure arrival time, Disp: 4000 mL, Rfl: 0    propranolol (Inderal) 10 mg tablet, Take 1 tablet (10 mg) by mouth 2 times a day. (Patient not taking: Reported on 3/4/2025), Disp: 180 tablet, Rfl: 0    rizatriptan MLT (Maxalt-MLT) 10 mg disintegrating tablet, Take 1 tablet (10 mg) by mouth 1 time if needed for migraine. May repeat once in 2 hours if needed (Maximum 2 tablets in 24 hours), Disp: 9 tablet, Rfl: 1    rosuvastatin (Crestor) 20 mg tablet, Take 0.5 tablets (10 mg) by mouth once daily., Disp: 45 tablet, Rfl: 3      OBJECTIVE [x]Expand by Default  There were no vitals taken for this visit.     REVIEW OF SYSTEMS:    Constitutional: negative for fevers, chills, unintentional weight loss  HEENT: Positive: hoarse voice.  negative for changes in vision, headaches, changes in hearing, congestion, sore throat  Cardiovascular: negative for chest pain, palpitations  Respiratory: negative for cough, shortness of breath  Gastrointestinal: negative for nausea, vomiting, diarrhea  Genitourinary: negative for dysuria, hematuria  Musculoskeletal: negative for joint swelling or pain  Skin: negative for rashes or lesions  Psych: negative for depression, anxiety  Endocrine: negative for polyuria, polydipsia, cold/heat intolerance  Hem/Lymph: negative for bleeding disorder     PHYSICAL EXAM  General: alert and oriented, no apparent distress    Focused exam of the breasts:  Right: Incision c/d/I, healed well.   Bottoming out of implant, loss of IMF, superior pole hollowing  Left: Incision c/d/i     ASSESSMENT/PLAN  Scott Rizzoy 60 y.o. female who had right breast implant replacement and left breast mastopexy fro symmetry on 2/10/25 who presents for POV.    Loss of inframammary fold resulting in bottoming out implant and superior pole hollowing.  The superior pole  hollowing is addressed when holding the implant in proper position.  I suggest revision of the right reconstructed breast with placement of an internal biologic mesh such as gala flex.    Continue activity restrictions, she can drive to short distances.   Continue adequate protein intake.    I would like to perform her next surgery LMA as a result of the hoarseness of her voice status post surgery. I would like her to be seen by ENT prior to the surgery to ensure they are able to perform the surgery LMA. We will let her know whom she will be referred to.   Her next surgery will be scheduled for 05/21/2025 including a revision of the right reconstructive breast with mesh as she requires internal support due to the inframammory fold.     RTC status post 05/21/2025 revision of the right reconstructive breast.     Attending Attestation:  Danica MORFIN MD, personal performed the history, exam, and decision making on this patient.     Addendum: patient request surgery in June.  Now moved to 6/16    Scribe Attestation   By signing my name below, Fannie MORFIN, Scribe attestation that this documentation has been prepared under the direction and in the presence of Danica Torres MD.

## 2025-04-15 ENCOUNTER — APPOINTMENT (OUTPATIENT)
Dept: PLASTIC SURGERY | Facility: CLINIC | Age: 60
End: 2025-04-15
Payer: COMMERCIAL

## 2025-04-15 VITALS
HEART RATE: 70 BPM | DIASTOLIC BLOOD PRESSURE: 86 MMHG | SYSTOLIC BLOOD PRESSURE: 146 MMHG | HEIGHT: 66 IN | BODY MASS INDEX: 37.57 KG/M2 | WEIGHT: 233.8 LBS

## 2025-04-15 DIAGNOSIS — N65.1 BREAST ASYMMETRY BETWEEN NATIVE BREAST AND RECONSTRUCTED BREAST: Primary | ICD-10-CM

## 2025-04-15 PROCEDURE — 3008F BODY MASS INDEX DOCD: CPT | Performed by: SURGERY

## 2025-04-15 PROCEDURE — 99024 POSTOP FOLLOW-UP VISIT: CPT | Performed by: SURGERY

## 2025-04-15 PROCEDURE — 3077F SYST BP >= 140 MM HG: CPT | Performed by: SURGERY

## 2025-04-15 PROCEDURE — 3079F DIAST BP 80-89 MM HG: CPT | Performed by: SURGERY

## 2025-04-15 NOTE — LETTER
April 15, 2025     Patient: Scott Leung   YOB: 1965   Date of Visit: 4/15/2025       To Whom It May Concern:    It is my medical opinion that Scott Leung may return to light duty work on 5/23/2025 with the following restrictions: no lifting, pushing or pulling greater than 20 lbs.     If you have any questions or concerns, please don't hesitate to call.      Sincerely,      Danica Torres MD    CC: No Recipients

## 2025-04-18 PROBLEM — N65.1 BREAST ASYMMETRY BETWEEN NATIVE BREAST AND RECONSTRUCTED BREAST: Status: ACTIVE | Noted: 2025-04-15

## 2025-04-18 RX ORDER — ACETAMINOPHEN 325 MG/1
975 TABLET ORAL ONCE
OUTPATIENT
Start: 2025-04-18

## 2025-04-18 RX ORDER — APREPITANT 40 MG/1
40 CAPSULE ORAL DAILY
OUTPATIENT
Start: 2025-04-19

## 2025-04-18 RX ORDER — GABAPENTIN 300 MG/1
600 CAPSULE ORAL ONCE
OUTPATIENT
Start: 2025-04-18

## 2025-04-22 ENCOUNTER — OFFICE VISIT (OUTPATIENT)
Dept: HEMATOLOGY/ONCOLOGY | Facility: CLINIC | Age: 60
End: 2025-04-22
Payer: COMMERCIAL

## 2025-04-22 VITALS
SYSTOLIC BLOOD PRESSURE: 147 MMHG | OXYGEN SATURATION: 97 % | WEIGHT: 233.14 LBS | HEART RATE: 76 BPM | TEMPERATURE: 97.2 F | DIASTOLIC BLOOD PRESSURE: 86 MMHG | BODY MASS INDEX: 37.63 KG/M2

## 2025-04-22 DIAGNOSIS — C50.111 MALIGNANT NEOPLASM OF CENTRAL PORTION OF RIGHT BREAST IN FEMALE, ESTROGEN RECEPTOR POSITIVE: ICD-10-CM

## 2025-04-22 DIAGNOSIS — Z17.0 MALIGNANT NEOPLASM OF CENTRAL PORTION OF RIGHT BREAST IN FEMALE, ESTROGEN RECEPTOR POSITIVE: ICD-10-CM

## 2025-04-22 PROCEDURE — 3079F DIAST BP 80-89 MM HG: CPT | Performed by: STUDENT IN AN ORGANIZED HEALTH CARE EDUCATION/TRAINING PROGRAM

## 2025-04-22 PROCEDURE — 99215 OFFICE O/P EST HI 40 MIN: CPT | Performed by: STUDENT IN AN ORGANIZED HEALTH CARE EDUCATION/TRAINING PROGRAM

## 2025-04-22 PROCEDURE — 3077F SYST BP >= 140 MM HG: CPT | Performed by: STUDENT IN AN ORGANIZED HEALTH CARE EDUCATION/TRAINING PROGRAM

## 2025-04-22 RX ORDER — EXEMESTANE 25 MG/1
25 TABLET ORAL DAILY
Qty: 90 TABLET | Refills: 4 | Status: SHIPPED | OUTPATIENT
Start: 2025-04-22 | End: 2026-04-22

## 2025-04-22 ASSESSMENT — PAIN SCALES - GENERAL: PAINLEVEL_OUTOF10: 3

## 2025-04-22 ASSESSMENT — ENCOUNTER SYMPTOMS: APPETITE CHANGE: 0

## 2025-04-22 NOTE — PROGRESS NOTES
Breast Medical Oncology Clinic  Location: [facility]    Visit Type: Follow-up Visit    Oncologic History:    6/8/2023: Screening mammogram: No mammographic evidence of malignancy.    Patient palpated right breast mass.    2/19/2024: Diagnostic breast imaging: On mammogram there is a masslike focal asymmetry with spiculations in the central breast at mid depth.  On ultrasound there is a mass seen in the subareolar region at 11 o'clock position measuring 2.9 x 2.3 x 1.9 cm.  4 morphologically normal right axillary lymph nodes noted.    3/1/2024: Right breast ultrasound-guided core needle biopsy: Invasive lobular carcinoma grade 2, ER positive (85%) [place] negative and HER2 negative.  There is atypical lobular hyperplasia present.    3/22/2024: MRI of the breast: Within the right breast there is an irregular mass with biopsy clip centered within the mass.  There is additional surrounding confluent non-mass enhancement, low level and intensity.  This is asymmetric to the left breast.  This extends posteriorly from the mass then anteriorly to involve the nipple areolar complex.  This entire area spans 5.2 x 3.6 x 2.8 cm.  There is no evidence of pectoralis or chest wall involvement.  No axillary or internal mammary lymphadenopathy present.  No findings in the left breast.    Patient elected to defer surgery for several months to be able to attend her son's wedding.  She was placed on anastrozole for about 2 to 3 months.  She reports she did not tolerate it well and developed daily migraines.  She has a history of migraines.  Migraines resolved with discontinuation of anastrozole preoperatively.    6/10/2024: Right breast mastectomy and sentinel lymph node biopsy and tissue expander placement: Final pathology yields multiple foci and invasive carcinoma, largest invasive lobular carcinoma foci measuring 4.2 cm.  There is LCIS present.  All margins are negative.  4 regional lymph nodes examined and all negative for tumor.  PT2  pN0    Oncotype DX recurrence score 18    24: Started exemestane    Subjective: History of Present Illness    Patient presents today for follow-up visit.      Denies interval events since last follow-up- no recent hospitalizations, new medical diagnoses, or new medications.    Remains on exemestane. Struggling with insomnia, sleeps 2 hours at a time and requires naps. Unsure if medication related or being off of her routine.     Starting to develop some joint achiness- starting in hands, feet, knees. Managing. Sometimes prevents from walking.     4th recon surgery planned in .    Weight stable.    Was on restrictions from surgery so previously not exercising.     Denies weight loss, changes in the breast and/or chest wall, new aches or pains, changes in appetite or energy level.     Gynecologic History:     Age of first menses: 12 years old  Age of last menses: 41 years old, post-menopausal  ; FLB at age 22  She did not breastfeed  Uterus/Ovaries: Intact  OCP: <5 years  HRT: Yes    Pertinent Family history:    Breast Cancer: Paternal cousins X5   Ovarian Cancer: None  Pancreatic Cancer: None  Other:  father- liver cancer; mother- bone cancer    Social History  Scott Leung  reports that she has never smoked. She has never used smokeless tobacco.  She  reports current alcohol use of about 6.0 standard drinks of alcohol per week.  She  reports no history of drug use.    ROS:     Review of Systems   Constitutional:  Negative for appetite change.   All other systems reviewed and are negative.       Physical Examination:    /86 (BP Location: Right arm, Patient Position: Sitting, BP Cuff Size: Large adult)   Pulse 76   Temp 36.2 °C (97.2 °F) (Temporal)   Wt 106 kg (233 lb 2.2 oz)   SpO2 97%   BMI 37.63 kg/m²     Physical Exam  Vitals and nursing note reviewed.   Constitutional:       General: She is not in acute distress.     Appearance: Normal appearance. She is not toxic-appearing.   HENT:       Head: Normocephalic and atraumatic.   Eyes:      Conjunctiva/sclera: Conjunctivae normal.   Cardiovascular:      Rate and Rhythm: Normal rate.   Pulmonary:      Effort: Pulmonary effort is normal. No respiratory distress.   Abdominal:      General: Abdomen is flat.      Palpations: Abdomen is soft.   Musculoskeletal:         General: No swelling. Normal range of motion.      Cervical back: Normal range of motion.   Skin:     General: Skin is warm and dry.   Neurological:      Mental Status: She is alert.   Psychiatric:         Mood and Affect: Mood normal.       Breast Examination:    Status post bilateral mastectomy with reconstruction of the right breast.  Left breast without masses skin or nipple changes.    ECOG Performance Status:     [] 0 Fully active, able to carry on all pre-disease performance without restriction  [] 1 Restricted in physically strenuous activity but ambulatory and able to carry out work of a light or sedentary nature, e.g., light house work, office work  [] 2 Ambulatory and capable of all selfcare but unable to carry out any work activities; up and about more than 50% of waking hours  [] 3 Capable of only limited selfcare; confined to bed or chair more than 50% of waking hours  [] 4 Completely disabled; cannot carry on any selfcare; totally confined to bed or chair  [] 5 Dead     Results:    Labs:  Reviewed above in Onc History    Lab Results   Component Value Date    WBC 7.7 11/26/2024    HGB 14.6 11/26/2024    HCT 43.2 11/26/2024    MCV 90 11/26/2024     11/26/2024       Chemistry    Lab Results   Component Value Date/Time     11/26/2024 1002    K 4.5 11/26/2024 1002     11/26/2024 1002    CO2 28 11/26/2024 1002    BUN 10 11/26/2024 1002    CREATININE 0.94 11/26/2024 1002    Lab Results   Component Value Date/Time    CALCIUM 10.1 11/26/2024 1002    ALKPHOS 113 11/26/2024 1002    AST 27 11/26/2024 1002    ALT 35 11/26/2024 1002    BILITOT 0.7 11/26/2024 1002              Imaging:  Reviewed above in Onc History    Pathology:  Reviewed above in Onc History    Assessment:     Pathologic prognostic stage IA (pT2 pN0 cM0) infiltrating lobular carcinoma of the right breast; Dx in 3/2024; Grade 2; ER positive (85%), AK negative, HER2 negative; Oncotype DX 18 ; S/p right mastectomy and sentinel lymph node biopsy; On exemestane    Tolerating exemestane relatively well. Wishes to remain on this at this time. No evidence of breast cancer recurrence per patient history, physical examination and imaging findings.       Breast Cancer Treatment Plan:    Surgical Plan: S/p R mastectomy with SLNBx  Additional biopsy: No further biopsy indicated  Radiation Plan: Not indicated, discussed at TB  Additional staging scans/DEXA/echo: Staging scans not indicated based on current stage, patient history and physical examination.  10/2024 DEXA normal  Additional Path info (i.e Ki67, PDL1): Not indicated  Gene assays: Oncotype DX 18    Systemic treatment plan: Exemestane   Intent: Curative   Clinical trial: Not eligible for our current trials   Endocrine therapy: Exemestane; did not tolerate anastrozole developed daily migraines   HER2 treatment: not indicated   Targeted agents: not indicated   Chemotherapy: Not indicated   BMA: Briefly discussed, minimal benefit given normal bone density and stage I disease.  Agreed to defer    Access: Not indicated  Supportive meds: No new supportive medications prescribed  Genetic testing: Completed; negative  Fertility preservation: Not indicated    Other active problems/orders:     We spent a portion of our encounter discussing lifestyle modifications that may help with cancer outcomes and overall wellbeing. We discussed regular exercise aiming for 30 minutes 5 times a week. We discussed diet modifications such as limiting red meat and processed foods. We also discussed limiting alcohol intake.    Surveillance plan: Continue yearly mammogram of the left breast,  arranged by Dr. Aaron    Follow-up: 6 months with NP; 1 year MD Scott Leung expressed understanding of the plan outlined above. Scott Leung had ample time to ask questions. Scott Leung understands she can contact us at any time should she have additional questions or issues arise in the interim.    Clarence Shore MD  Breast Medical Oncology  [facility]    Portions of this note were dictated utilizing voice recognition software.

## 2025-04-24 DIAGNOSIS — I10 BENIGN ESSENTIAL HYPERTENSION: ICD-10-CM

## 2025-04-24 DIAGNOSIS — G43.909 MIGRAINE, UNSPECIFIED, NOT INTRACTABLE, WITHOUT STATUS MIGRAINOSUS: ICD-10-CM

## 2025-04-24 RX ORDER — PROPRANOLOL HYDROCHLORIDE 10 MG/1
10 TABLET ORAL 2 TIMES DAILY
Qty: 180 TABLET | Refills: 1 | Status: SHIPPED | OUTPATIENT
Start: 2025-04-24

## 2025-04-24 RX ORDER — LOSARTAN POTASSIUM 100 MG/1
100 TABLET ORAL DAILY
Qty: 90 TABLET | Refills: 0 | Status: SHIPPED | OUTPATIENT
Start: 2025-04-24

## 2025-05-09 ENCOUNTER — APPOINTMENT (OUTPATIENT)
Dept: OTOLARYNGOLOGY | Facility: CLINIC | Age: 60
End: 2025-05-09
Payer: COMMERCIAL

## 2025-05-09 ENCOUNTER — OFFICE VISIT (OUTPATIENT)
Dept: OTOLARYNGOLOGY | Facility: CLINIC | Age: 60
End: 2025-05-09
Payer: COMMERCIAL

## 2025-05-09 VITALS — TEMPERATURE: 97 F | HEIGHT: 66 IN | BODY MASS INDEX: 37.06 KG/M2 | WEIGHT: 230.6 LBS

## 2025-05-09 DIAGNOSIS — J38.7 LARYNGEAL GRANULOMA: Primary | ICD-10-CM

## 2025-05-09 DIAGNOSIS — R49.0 DYSPHONIA: ICD-10-CM

## 2025-05-09 PROCEDURE — 31579 LARYNGOSCOPY TELESCOPIC: CPT

## 2025-05-09 PROCEDURE — 99214 OFFICE O/P EST MOD 30 MIN: CPT | Mod: 25

## 2025-05-09 PROCEDURE — 3008F BODY MASS INDEX DOCD: CPT

## 2025-05-09 PROCEDURE — 99204 OFFICE O/P NEW MOD 45 MIN: CPT

## 2025-05-09 PROCEDURE — 1036F TOBACCO NON-USER: CPT

## 2025-05-09 ASSESSMENT — PAIN SCALES - GENERAL: PAINLEVEL_OUTOF10: 0-NO PAIN

## 2025-05-09 ASSESSMENT — PATIENT HEALTH QUESTIONNAIRE - PHQ9
1. LITTLE INTEREST OR PLEASURE IN DOING THINGS: NOT AT ALL
SUM OF ALL RESPONSES TO PHQ9 QUESTIONS 1 AND 2: 0
2. FEELING DOWN, DEPRESSED OR HOPELESS: NOT AT ALL

## 2025-05-09 NOTE — PROGRESS NOTES
Reason For Consult  Chief Complaint   Patient presents with    Hoarseness        HISTORY OF PRESENT ILLNESS:  Scott Leung, who is a 60 y.o. female presenting for an initial visit for dysphonia.  The patient reports that she has been experiencing dysphonia post surgery in February of 2025. Patient reports having a hx of malignant neoplasm of breast that has previously require surgical removal and reconstructive surgeries.Patient reports the hoarseness as daily all throughout the day. Patient reports some increased shortness of breath with talking. Patient reports tolerating solids, liquids, and pills when swallowing. Patient denies any symptoms of acid reflux. Patient denies any smoking history.        Past Medical History  She has a past medical history of Arthritis, Breast cancer (06/2024), Depression, HTN (hypertension), Hyperlipidemia, Hypothyroidism, Insomnia, Low vitamin D level, Migraines, Obesity, Primary localized osteoarthrosis of left lower leg, Scoliosis, Sensorineural hearing loss (SNHL) of right ear with restricted hearing of left ear, Situational anxiety, and TMJ arthropathy.  Surgical History  She has a past surgical history that includes Knee arthroscopy w/ debridement (Left); Ovarian cyst removal; Tubal ligation; [place] tooth extraction (1985); Inner ear surgery (Right); Ulnar nerve transposition (Left); Mastectomy w/ sentinel node biopsy (Right, 06/10/2024); Nasal endoscopy (2018); Breast Capsulectomy (Right, 08/05/2024); and Knee surgery (Bilateral).     Social History  She reports that she has never smoked. She has never used smokeless tobacco. She reports current alcohol use of about 6.0 standard drinks of alcohol per week. She reports that she does not use drugs.    Allergies  Latex and Tetanus vaccines and toxoid    Review of Systems  All 10 systems were reviewed and negative except for above.      Physical Exam  CONSTITUTIONAL: Well developed, well nourished.    VOICE: moderate hoarseness  "and breathiness  RESPIRATION: Breathing comfortably, no stridor.    NEURO: Alert and oriented x3, cranial nerves II-XII intact and symmetric bilaterally.    EARS: Normal external ears, external auditory canals, normal hearing to conversational voice.    NOSE: External nose midline, anterior rhinoscopy is normal with limited visualization to the anterior aspect of the interior turbinates. No lesions noted.     ORAL CAVITY/OROPHARYNX/LIPS: Normal mucous membranes, normal floor of mouth/tongue/OP, no masses or lesions are noted.    SKIN: Neck skin is intact  PSYCH: Alert and oriented with appropriate mood and affect.        Last Recorded Vitals  Temperature 36.1 °C (97 °F), height 1.676 m (5' 6\"), weight 105 kg (230 lb 9.6 oz).    Procedure  PROCEDURE NOTE:  Recommended flexible laryngoscopy/stroboscopy.  Risks, benefits,  and alternatives were explained.  They wish to proceed and provide verbal consent.     PROCEDURE:  Flexible Laryngoscopy, CPT 73654  Flexible laryngoscopy with stroboscopy, CPT 13271     POSTPROCEDURE DIAGNOSIS: right vc ball-valving granuloma    INDICATIONS: Inability to tolerate mirror exam or abnormal findings on mirror, Flexible Laryngoscopy/Stroboscopy performed to assess one of the followin. Diagnosis of symptomatic disorder involving the voice, swallow, upper aerodigestive tract, including RAYMOND disorders, or  2. Preoperative evaluation of vocal cord function for individuals undergoing surgery where the RLN or vagus nerves are at risk of injury, or  3. Further evaluation of abnormalities of the upper aerodigestive tract discovered by another modality, such as CT, MRI, bronchoscopy or EGD    Description of Procedure:    After adequate afrin and lidocaine spray, I advanced the endoscope.  Visualization of the nasopharynx, vallecula, posterior pharyngeal walls, pyriform, epiglottis and post cricoid areas was unremarkable.  The following laryngeal findings were noted:    vocal cord movement was " normal  closure was limited due to granuloma tissue  edema was  bilateral vc   lesions were right vc ball-valve granuloma tissue  the subglottis was widely patent  Pharyngeal wall squeeze was normal      Procedure well tolerated.   Relevant Results    ASSESSMENT AND PLAN:   This is an initial visit for dysphonia with clinical findings notable for right vc ball-valve granuloma tissue. Patient scheduled for reconstructive breast surgery 6/16/25 with LMA. Will review scope exam with Dr. Delong and discuss surgical intervention timing with upcoming breast surgery. Will call patient after speaking with Dr. Delong and next steps. Patient agreeable to plan. All questions answered.

## 2025-05-14 NOTE — PROGRESS NOTES
"PLASTIC SURGERY PRE-OP CLINIC NOTE  Date: 5/15/25  Subjective :  Patient ID: Scott Leung  is a 60 y.o. female is here for pre-op appointment     Planned Date of Procedure: 6/16/25  Planned Surgical Procedure: Revision of Reconstruction with Implants with Fat Grafting     HPI:   Scott Leung is a 60 y.o. female is here for pre-operative appointment for the above procedure(s).     Currently, the patient states there were no changes in their medications or medical history.     Patient's vitals are Blood pressure 148/79, pulse 73, height 1.676 m (5' 6\"), weight 104 kg (230 lb).  today.     Patient is not currently on an ACE, ARB, or Diuretic.     Patient has Good Rx Card.     Patient is not on chronic NSAIDs.       MEDICATIONS  Current Medications[1]     REVIEW OF SYSTEMS:    Constitutional: negative for fevers, chills, unintentional weight loss  HEENT: negative for changes in vision, headaches, changes in hearing, congestion, sore throat  Cardiovascular: negative for chest pain, palpitations  Respiratory: negative for cough, shortness of breath  Gastrointestinal: negative for nausea, vomiting, diarrhea  Genitourinary: negative for dysuria, hematuria  Musculoskeletal: negative for joint swelling or pain  Skin: negative for rashes or lesions  Psych: negative for depression, anxiety  Endocrine: negative for polyuria, polydipsia, cold/heat intolerance  Hem/Lymph: negative for bleeding disorder    PHYSICAL EXAM  General: alert and oriented, no apparent distress  Psych: normal mood and affect, judgement intact.   Eyes: No conjunctival erythema, extraocular movements intact, no scleral icterus, pupils equal and reactive to light  HENT: normocephalic, atraumatic, no rhinorrhea, no trauma to external meatus, no prior surgical scars  CV: hemodynamically stable  Resp: unlabored, no increased work of breathing, equal bilateral chest rise, no cough or stridor  Abdomen: soft, non-tender, non-distended. No surgical scars present. " No rashes noted. No rectus diastasis present   Neuro: Unremarkable without focal findings, AAOx3, CN 2-12 grossly intact  Extremities/Musculoskeletal: Upper and lower extremities are atraumatic in appearance without tenderness or deformity. No swelling or erythema. Full range of motion is noted to all joints. Steady gait noted.    Skin: Intact, soft and warm; no rashes, lesions, or bruising. No large masses or keloids noted on exam.     Focused exam of the breasts:  Right: Incision c/d/I, healed well.   Bottoming out of implant, loss of IMF, superior pole hollowing  Left: Incision c/d/i     LABORATORY  Nutrition  Lab Results   Component Value Date    ALBUMIN 4.8 11/26/2024          ASSESSMENT/PLAN  Scott Leung is a 60 y.o. female with hx of right breast implant replacement and left breast mastopexy for symmetry on 2/10/25    The patient will be undergoing Right Revision of with Implant on 6/16/25 [place] [facility] [place]    Informed consent discussed with the patient, including: condition, proposed care, treatments and services, alternative forms of treatment, and risks of no treatment.  Details discussed around the procedures to be used, and the risks and hazards involved, potential benefits, and side effects of the patient's proposed care, treatment, and services; the likelihood of the patient achieving his or her goals; and any potential problems that might occur during recuperation. Reasonable alternative also discussed with the patient's proposed care, treatment, and services. The discussion encompasses risks, benefits, and side effects related to the alternative and risks related to not receiving the proposed care, treatment, and services. Written informed consent was obtained.    The patient was counseled to avoid anticoagulation medications and herbals including - but not limited to - ASA, NSAIDs, Plavix, fish oils, and green tea    Patient was counseled to avoid nicotine and areas with high amounts of secondhand smoke.      Patient was counseled to increase protein intake after surgery to aid with wound healing with goal of 120g/day.     Patient was counseled on need for compression garments and/or support bras, given information about where to acquire these garments, and instructions to bring with on the day of surgery.     We discussed postoperative follow-up visits, recuperation and return to work.    Advised patient to contact office with any questions or concerns    All findings and diagnosis was discussed with patient at the time of this visit. Patient states they understand and are in agreement with the treatment plan at the time of this visit.     Photos completed last visit     Medications eRx'd:     -Celebrex 200 mg BID with meals- Good Rx card provided to the patient.  -Gabapentin 600 mg Q8H   -Tylenol 1000 mg Q8H  -BactrimDS BID x 7 days   -Hibiclens - instructed the patient to wash breast and/or abdomen and margins the night before surgery or the morning of surgery; avoid washing face/eyes (2 packets if breast only, and 5 packets if breast and abdomen)    Pt is currently scheduled for revision of the right breast but she expressed interested in further revision of the left breast as well. As this was not discussed at her prior visit, she will require re-evaluation by Dr. Torres to determine surgical plan and sign consent.     In addition, she was seen by ENT on 5/9 for evaluation of dysphonia following her previous surgery. 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 She is in touch with provider to confirm that this will not be an issue with anesthesia/ LMA for her upcoming surgery. Will contact our office with any concerns.     RTC 6/3/25 for further surgical discussion with Dr. Melissa Morrison PA-C           [1]   Current Outpatient Medications:     amitriptyline (Elavil) 25 mg tablet, Take 1 tablet (25 mg) by mouth as needed at bedtime for sleep., Disp: 30 tablet, Rfl: 1    amLODIPine (Norvasc)  10 mg tablet, Take 1 tablet (10 mg) by mouth once daily., Disp: 90 tablet, Rfl: 3    ASPIRIN LOW-STRENGTH ORAL, Take 81 mg by mouth every other day., Disp: , Rfl:     chlorhexidine (Peridex) 0.12 % solution, 15 ml swish and spit for 30 seconds night prior to surgery and morning of surgery, Disp: 473 mL, Rfl: 0    cholecalciferol (Vitamin D-3) 25 MCG (1000 UT) capsule, Take 1 capsule (25 mcg) by mouth once daily. (Patient not taking: Reported on 5/9/2025), Disp: , Rfl:     escitalopram (Lexapro) 10 mg tablet, Take 0.5 tablets (5 mg) by mouth once daily., Disp: 45 tablet, Rfl: 3    exemestane (Aromasin) 25 mg tablet, Take 1 tablet (25 mg total) by mouth once daily.  Take after a meal.  Try to take at the same time each day., Disp: 90 tablet, Rfl: 4    gabapentin (Neurontin) 300 mg capsule, Take 1 capsule (300 mg) by mouth 3 times a day. (Patient not taking: Reported on 5/9/2025), Disp: 90 capsule, Rfl: 0    hydrocortisone 2.5 % cream, , Disp: , Rfl:     levothyroxine (Synthroid, Levoxyl) 25 mcg tablet, Take 1 tablet (25 mcg) by mouth once daily in the morning., Disp: 90 tablet, Rfl: 3    losartan (Cozaar) 100 mg tablet, Take 1 tablet (100 mg) by mouth once daily., Disp: 90 tablet, Rfl: 0    polyethylene glycol (GoLYTELY) 236-22.74-6.74 -5.86 gram solution, Drink 1/2 starting at 6 pm the night before your procedure then drink the 2nd 1/2 5 hours before procedure arrival time (Patient not taking: Reported on 5/9/2025), Disp: 4000 mL, Rfl: 0    propranolol (Inderal) 10 mg tablet, Take 1 tablet (10 mg) by mouth 2 times a day., Disp: 180 tablet, Rfl: 1    rizatriptan MLT (Maxalt-MLT) 10 mg disintegrating tablet, Take 1 tablet (10 mg) by mouth 1 time if needed for migraine. May repeat once in 2 hours if needed (Maximum 2 tablets in 24 hours), Disp: 9 tablet, Rfl: 1    rosuvastatin (Crestor) 20 mg tablet, Take 0.5 tablets (10 mg) by mouth once daily., Disp: 45 tablet, Rfl: 3

## 2025-05-15 ENCOUNTER — APPOINTMENT (OUTPATIENT)
Dept: PLASTIC SURGERY | Facility: CLINIC | Age: 60
End: 2025-05-15
Payer: COMMERCIAL

## 2025-05-15 VITALS
HEIGHT: 66 IN | BODY MASS INDEX: 36.96 KG/M2 | DIASTOLIC BLOOD PRESSURE: 79 MMHG | SYSTOLIC BLOOD PRESSURE: 148 MMHG | WEIGHT: 230 LBS | HEART RATE: 73 BPM

## 2025-05-15 DIAGNOSIS — C50.111 MALIGNANT NEOPLASM OF CENTRAL PORTION OF RIGHT BREAST IN FEMALE, ESTROGEN RECEPTOR POSITIVE: ICD-10-CM

## 2025-05-15 DIAGNOSIS — Z17.0 MALIGNANT NEOPLASM OF CENTRAL PORTION OF RIGHT BREAST IN FEMALE, ESTROGEN RECEPTOR POSITIVE: ICD-10-CM

## 2025-05-15 DIAGNOSIS — G89.18 POST-OP PAIN: ICD-10-CM

## 2025-05-15 RX ORDER — ACETAMINOPHEN 500 MG
1000 TABLET ORAL EVERY 8 HOURS
Qty: 84 TABLET | Refills: 0 | Status: SHIPPED | OUTPATIENT
Start: 2025-05-15 | End: 2025-05-29

## 2025-05-15 RX ORDER — SULFAMETHOXAZOLE AND TRIMETHOPRIM 800; 160 MG/1; MG/1
1 TABLET ORAL 2 TIMES DAILY
Qty: 28 TABLET | Refills: 0 | Status: SHIPPED | OUTPATIENT
Start: 2025-05-15 | End: 2025-05-29

## 2025-05-15 RX ORDER — CELECOXIB 200 MG/1
200 CAPSULE ORAL 2 TIMES DAILY
Qty: 28 CAPSULE | Refills: 0 | Status: SHIPPED | OUTPATIENT
Start: 2025-05-15 | End: 2025-05-29

## 2025-05-15 RX ORDER — GABAPENTIN 300 MG/1
300 CAPSULE ORAL EVERY 8 HOURS
Qty: 42 CAPSULE | Refills: 0 | Status: SHIPPED | OUTPATIENT
Start: 2025-05-15 | End: 2025-05-29

## 2025-05-15 RX ORDER — CHLORHEXIDINE GLUCONATE 40 MG/ML
SOLUTION TOPICAL ONCE
Qty: 118 ML | Refills: 0 | Status: SHIPPED | OUTPATIENT
Start: 2025-05-15 | End: 2025-05-15

## 2025-05-22 ENCOUNTER — TELEPHONE (OUTPATIENT)
Dept: OTOLARYNGOLOGY | Facility: CLINIC | Age: 60
End: 2025-05-22
Payer: COMMERCIAL

## 2025-05-22 DIAGNOSIS — J38.7 LARYNGEAL GRANULOMA: ICD-10-CM

## 2025-05-28 ENCOUNTER — APPOINTMENT (OUTPATIENT)
Dept: PRIMARY CARE | Facility: CLINIC | Age: 60
End: 2025-05-28
Payer: COMMERCIAL

## 2025-06-02 ENCOUNTER — APPOINTMENT (OUTPATIENT)
Dept: GASTROENTEROLOGY | Facility: EXTERNAL LOCATION | Age: 60
End: 2025-06-02
Payer: COMMERCIAL

## 2025-06-02 DIAGNOSIS — Z83.710 FAMILY HISTORY OF ADENOMATOUS AND SERRATED POLYPS: ICD-10-CM

## 2025-06-02 DIAGNOSIS — Z12.11 ENCOUNTER FOR SCREENING FOR MALIGNANT NEOPLASM OF COLON: Primary | ICD-10-CM

## 2025-06-02 DIAGNOSIS — D12.3 BENIGN NEOPLASM OF TRANSVERSE COLON: ICD-10-CM

## 2025-06-02 DIAGNOSIS — Z12.11 ENCOUNTER FOR SCREENING FOR MALIGNANT NEOPLASM OF COLON: ICD-10-CM

## 2025-06-02 PROCEDURE — 0753T DGTZ GLS MCRSCP SLD LEVEL IV: CPT

## 2025-06-02 PROCEDURE — 88305 TISSUE EXAM BY PATHOLOGIST: CPT

## 2025-06-02 PROCEDURE — 88305 TISSUE EXAM BY PATHOLOGIST: CPT | Performed by: PATHOLOGY

## 2025-06-02 PROCEDURE — 45385 COLONOSCOPY W/LESION REMOVAL: CPT | Performed by: INTERNAL MEDICINE

## 2025-06-02 NOTE — PROGRESS NOTES
Colonoscopy performed today 6/2/2025 at the [facility] ([facility] [place]  See procedure report(s) under Media tab.

## 2025-06-02 NOTE — PROGRESS NOTES
PLASTIC SURGERY PRE-OP CLINIC NOTE  Date: 6/3/25  Subjective :  Patient ID: Scott Leung  is a 60 y.o. female is here for pre-op appointment     Planned Date of Procedure: 7/9/25  Planned Surgical Procedure: Revision of Reconstruction with Implants RIGHT breast    HPI:   Scott Leung is a 60 y.o. female is here for pre-operative appointment for the above procedure(s).     Currently, the patient states there were no changes in their medications or medical history.     Patient's vitals are Blood pressure 121/62, pulse 66, temperature 36.8 °C (98.2 °F), temperature source Temporal, weight 105 kg (232 lb 4.1 oz), SpO2 95%.  today.     Patient is not currently on an ACE, ARB, or Diuretic.     Patient has Good Rx Card.     Patient is not on chronic NSAIDs.       MEDICATIONS  Current Medications[1]     REVIEW OF SYSTEMS:    Constitutional: negative for fevers, chills, unintentional weight loss  HEENT: negative for changes in vision, headaches, changes in hearing, congestion, sore throat  Cardiovascular: negative for chest pain, palpitations  Respiratory: negative for cough, shortness of breath  Gastrointestinal: negative for nausea, vomiting, diarrhea  Genitourinary: negative for dysuria, hematuria  Musculoskeletal: negative for joint swelling or pain  Skin: negative for rashes or lesions  Psych: negative for depression, anxiety  Endocrine: negative for polyuria, polydipsia, cold/heat intolerance  Hem/Lymph: negative for bleeding disorder    PHYSICAL EXAM  General: alert and oriented, no apparent distress  Psych: normal mood and affect, judgement intact.   Eyes: No conjunctival erythema, extraocular movements intact, no scleral icterus, pupils equal and reactive to light  HENT: normocephalic, atraumatic, no rhinorrhea, no trauma to external meatus, no prior surgical scars  CV: hemodynamically stable  Resp: unlabored, no increased work of breathing, equal bilateral chest rise, no cough or stridor  Abdomen: soft,  non-tender, non-distended. No surgical scars present. No rashes noted. No rectus diastasis present   Neuro: Unremarkable without focal findings, AAOx3, CN 2-12 grossly intact  Extremities/Musculoskeletal: Upper and lower extremities are atraumatic in appearance without tenderness or deformity. No swelling or erythema. Full range of motion is noted to all joints. Steady gait noted.    Skin: Intact, soft and warm; no rashes, lesions, or bruising. No large masses or keloids noted on exam.     Focused exam of the breasts:  Right: Incision c/d/I, healed well.   Bottoming out of implant, loss of IMF, superior pole hollowing  Left: Incision c/d/i     LABORATORY  Nutrition  Lab Results   Component Value Date    ALBUMIN 4.8 11/26/2024          ASSESSMENT/PLAN  Scott Leung is a 60 y.o. female with hx of right breast implant replacement and left breast mastopexy for symmetry on 2/10/25    The patient will be undergoing Right Revision of with Implant and reinforcement of IMF with caspule work and placement of mesh on 7/9 or 6/23 ( if not taken by another patient) at [facility] [place]    Informed consent discussed with the patient, including: condition, proposed care, treatments and services, alternative forms of treatment, and risks of no treatment.  Details discussed around the procedures to be used, and the risks and hazards involved, potential benefits, and side effects of the patient's proposed care, treatment, and services; the likelihood of the patient achieving his or her goals; and any potential problems that might occur during recuperation. Reasonable alternative also discussed with the patient's proposed care, treatment, and services. The discussion encompasses risks, benefits, and side effects related to the alternative and risks related to not receiving the proposed care, treatment, and services. Written informed consent was obtained.    The patient was counseled to avoid anticoagulation medications and herbals including - but  not limited to - ASA, NSAIDs, Plavix, fish oils, and green tea    Patient was counseled to avoid nicotine and areas with high amounts of secondhand smoke.     Patient was counseled to increase protein intake after surgery to aid with wound healing with goal of 120g/day.     Patient was counseled on need for compression garments and/or support bras, given information about where to acquire these garments, and instructions to bring with on the day of surgery.     We discussed postoperative follow-up visits, recuperation and return to work.    Advised patient to contact office with any questions or concerns    All findings and diagnosis was discussed with patient at the time of this visit. Patient states they understand and are in agreement with the treatment plan at the time of this visit.     Photos completed last visit     Medications eRx'd at last visit:     -Celebrex 200 mg BID with meals- Good Rx card provided to the patient.  -Gabapentin 600 mg Q8H   -Tylenol 1000 mg Q8H  -BactrimDS BID x 7 days   -Hibiclens - instructed the patient to wash breast and/or abdomen and margins the night before surgery or the morning of surgery; avoid washing face/eyes (2 packets if breast only, and 5 packets if breast and abdomen)    Scribe Attestation  By signing my name below, NAVJOT Chas Gustavo, Scribe, attest that this documentation has been prepared under the direction and in the presence of Danica Torres MD. Verbal consent obtained from the patient.      Attending Attestation:  Danica MORFIN MD, personal performed the history, exam, and decision making on this patient.  A total of 20 mins were spent in patient counseling, review of medical records, and coordination of care.           [1]   Current Outpatient Medications:     amitriptyline (Elavil) 25 mg tablet, Take 1 tablet (25 mg) by mouth as needed at bedtime for sleep., Disp: 30 tablet, Rfl: 1    amLODIPine (Norvasc) 10 mg tablet, Take 1 tablet (10 mg) by mouth once  daily., Disp: 90 tablet, Rfl: 3    ASPIRIN LOW-STRENGTH ORAL, Take 81 mg by mouth every other day., Disp: , Rfl:     cholecalciferol (Vitamin D-3) 25 MCG (1000 UT) capsule, Take 1 capsule (25 mcg) by mouth once daily., Disp: , Rfl:     escitalopram (Lexapro) 10 mg tablet, Take 0.5 tablets (5 mg) by mouth once daily., Disp: 45 tablet, Rfl: 3    exemestane (Aromasin) 25 mg tablet, Take 1 tablet (25 mg total) by mouth once daily.  Take after a meal.  Try to take at the same time each day., Disp: 90 tablet, Rfl: 4    hydrocortisone 2.5 % cream, , Disp: , Rfl:     levothyroxine (Synthroid, Levoxyl) 25 mcg tablet, Take 1 tablet (25 mcg) by mouth once daily in the morning., Disp: 90 tablet, Rfl: 3    losartan (Cozaar) 100 mg tablet, Take 1 tablet (100 mg) by mouth once daily., Disp: 90 tablet, Rfl: 0    propranolol (Inderal) 10 mg tablet, Take 1 tablet (10 mg) by mouth 2 times a day., Disp: 180 tablet, Rfl: 1    rizatriptan MLT (Maxalt-MLT) 10 mg disintegrating tablet, Take 1 tablet (10 mg) by mouth 1 time if needed for migraine. May repeat once in 2 hours if needed (Maximum 2 tablets in 24 hours), Disp: 9 tablet, Rfl: 1    rosuvastatin (Crestor) 20 mg tablet, Take 0.5 tablets (10 mg) by mouth once daily., Disp: 45 tablet, Rfl: 3    gabapentin (Neurontin) 300 mg capsule, Take 1 capsule (300 mg) by mouth every 8 hours for 14 days., Disp: 42 capsule, Rfl: 0

## 2025-06-03 ENCOUNTER — APPOINTMENT (OUTPATIENT)
Dept: PLASTIC SURGERY | Facility: CLINIC | Age: 60
End: 2025-06-03
Payer: COMMERCIAL

## 2025-06-03 ENCOUNTER — LAB REQUISITION (OUTPATIENT)
Dept: LAB | Facility: HOSPITAL | Age: 60
End: 2025-06-03
Payer: COMMERCIAL

## 2025-06-03 VITALS
WEIGHT: 232.25 LBS | HEART RATE: 66 BPM | BODY MASS INDEX: 37.49 KG/M2 | TEMPERATURE: 98.2 F | SYSTOLIC BLOOD PRESSURE: 121 MMHG | OXYGEN SATURATION: 95 % | DIASTOLIC BLOOD PRESSURE: 62 MMHG

## 2025-06-03 DIAGNOSIS — N65.1 BREAST ASYMMETRY BETWEEN NATIVE BREAST AND RECONSTRUCTED BREAST: Primary | ICD-10-CM

## 2025-06-03 DIAGNOSIS — Z12.11 ENCOUNTER FOR SCREENING FOR MALIGNANT NEOPLASM OF COLON: ICD-10-CM

## 2025-06-03 PROCEDURE — 99213 OFFICE O/P EST LOW 20 MIN: CPT | Performed by: SURGERY

## 2025-06-03 PROCEDURE — 3078F DIAST BP <80 MM HG: CPT | Performed by: SURGERY

## 2025-06-03 PROCEDURE — 3074F SYST BP LT 130 MM HG: CPT | Performed by: SURGERY

## 2025-06-03 ASSESSMENT — PAIN SCALES - GENERAL: PAINLEVEL_OUTOF10: 0-NO PAIN

## 2025-06-10 ENCOUNTER — APPOINTMENT (OUTPATIENT)
Dept: PRIMARY CARE | Facility: CLINIC | Age: 60
End: 2025-06-10
Payer: COMMERCIAL

## 2025-06-10 VITALS
SYSTOLIC BLOOD PRESSURE: 127 MMHG | OXYGEN SATURATION: 97 % | DIASTOLIC BLOOD PRESSURE: 71 MMHG | BODY MASS INDEX: 37.12 KG/M2 | WEIGHT: 230 LBS | HEART RATE: 79 BPM

## 2025-06-10 DIAGNOSIS — E03.9 ACQUIRED HYPOTHYROIDISM: ICD-10-CM

## 2025-06-10 DIAGNOSIS — I10 BENIGN ESSENTIAL HYPERTENSION: Primary | ICD-10-CM

## 2025-06-10 DIAGNOSIS — C50.111 MALIGNANT NEOPLASM OF CENTRAL PORTION OF RIGHT FEMALE BREAST, UNSPECIFIED ESTROGEN RECEPTOR STATUS: ICD-10-CM

## 2025-06-10 DIAGNOSIS — E78.5 HYPERLIPIDEMIA, UNSPECIFIED HYPERLIPIDEMIA TYPE: ICD-10-CM

## 2025-06-10 PROBLEM — H52.4 PRESBYOPIA OF BOTH EYES: Status: ACTIVE | Noted: 2024-11-08

## 2025-06-10 PROBLEM — H43.399 VITREOUS OPACITIES: Status: ACTIVE | Noted: 2024-11-08

## 2025-06-10 PROBLEM — H52.13 MYOPIA OF BOTH EYES: Status: ACTIVE | Noted: 2024-11-08

## 2025-06-10 PROBLEM — H43.813 PVD (POSTERIOR VITREOUS DETACHMENT), BOTH EYES: Status: ACTIVE | Noted: 2024-11-08

## 2025-06-10 PROBLEM — H52.223 REGULAR ASTIGMATISM OF BOTH EYES: Status: ACTIVE | Noted: 2024-11-08

## 2025-06-10 PROBLEM — G43.B0 OPHTHALMOPLEGIC MIGRAINE, NOT INTRACTABLE: Status: ACTIVE | Noted: 2024-11-08

## 2025-06-10 PROBLEM — S21.001S: Status: RESOLVED | Noted: 2024-08-01 | Resolved: 2025-06-10

## 2025-06-10 PROCEDURE — 3074F SYST BP LT 130 MM HG: CPT | Performed by: INTERNAL MEDICINE

## 2025-06-10 PROCEDURE — 99214 OFFICE O/P EST MOD 30 MIN: CPT | Performed by: INTERNAL MEDICINE

## 2025-06-10 PROCEDURE — 3078F DIAST BP <80 MM HG: CPT | Performed by: INTERNAL MEDICINE

## 2025-06-10 PROCEDURE — 1036F TOBACCO NON-USER: CPT | Performed by: INTERNAL MEDICINE

## 2025-06-10 ASSESSMENT — ENCOUNTER SYMPTOMS
LIGHT-HEADEDNESS: 0
ARTHRALGIAS: 1
WHEEZING: 0
ACTIVITY CHANGE: 0
MYALGIAS: 1
SHORTNESS OF BREATH: 0
COUGH: 0
DIZZINESS: 0
DIARRHEA: 0
HEADACHES: 0
PALPITATIONS: 0
ABDOMINAL PAIN: 0
UNEXPECTED WEIGHT CHANGE: 1
CHEST TIGHTNESS: 0
CONSTIPATION: 0
ABDOMINAL DISTENTION: 0
FATIGUE: 1

## 2025-06-10 NOTE — H&P (VIEW-ONLY)
Subjective   Patient ID: Scott Leung is a 60 y.o. female who presents for Follow-up.    MS. Leung comes in today for comprehensive 6-month follow-up.  She is feeling well overall.  She is scheduled for 1 additional reconstructive surgery later this summer.  She is hopeful that this is her final surgery.  She is not back to work yet, does feel a bit more fatigued and has gained some weight.  This is frustrating for her, but she understands that this continues to be part of her recovery and hopefully this will improve as her recovery continues.  She continues on blood pressure lowering therapy, this remains excellent.  She is tolerating the Aramosin.  She is following closely with her specialists.  She is prepared to update any comprehensive lab studies that may be indicated.  She denies any headaches, dizziness, chest pain or palpitations, shortness of breath nor cough, nausea, vomiting, nor changes in bowel or bladder habits.         Review of Systems   Constitutional:  Positive for fatigue and unexpected weight change (weight gain). Negative for activity change.   Respiratory:  Negative for cough, chest tightness, shortness of breath and wheezing.    Cardiovascular:  Negative for chest pain, palpitations and leg swelling.   Gastrointestinal:  Negative for abdominal distention, abdominal pain, constipation and diarrhea.   Musculoskeletal:  Positive for arthralgias and myalgias.   Neurological:  Negative for dizziness, light-headedness and headaches.       Objective   /71   Pulse 79   Wt 104 kg (230 lb)   SpO2 97%   BMI 37.12 kg/m²     Physical Exam  Constitutional:       Appearance: Normal appearance.   Cardiovascular:      Rate and Rhythm: Normal rate and regular rhythm.      Pulses: Normal pulses.      Heart sounds: Normal heart sounds. No murmur heard.     No gallop.   Pulmonary:      Effort: Pulmonary effort is normal. No respiratory distress.      Breath sounds: No wheezing, rhonchi or rales.    Abdominal:      General: There is no distension.      Palpations: Abdomen is soft.      Tenderness: There is no abdominal tenderness.   Musculoskeletal:      Right lower leg: No edema.      Left lower leg: No edema.   Neurological:      Mental Status: She is alert.         Assessment/Plan     1.  Hypertension: Excellent control.  No change in therapy.  Take this opportunity to update BMP.  Contact us if refills needed.  2.  Hyperlipidemia: Again, continues on statin therapy.  Update comprehensive labs when fasting.  3.  Hypothyroidism: Take this opportunity to update TSH especially with recent fatigue.  4.  Breast cancer: Stable on current therapy, additional reconstructive surgery later this summer.  Follows closely with her specialists.    Happy to see her back towards the end of the year for her wellness visit.  She is to contact us with any questions in the interim.

## 2025-06-10 NOTE — PATIENT INSTRUCTIONS
It was a pleasure seeing you in the office today.  We will follow up on all comprehensive blood work once results are available and make any recommendations based on these results as may be indicated.  Please continue on your medications with no change and contact us if you should need any refills.  We will watch for information from your upcoming specialist appointments and surgical procedures.  If you have any questions, please reach out.  Otherwise, we will plan to see you back towards the end of the year for your wellness visit.

## 2025-06-19 ENCOUNTER — ANESTHESIA EVENT (OUTPATIENT)
Dept: OPERATING ROOM | Facility: HOSPITAL | Age: 60
End: 2025-06-19
Payer: COMMERCIAL

## 2025-06-20 ENCOUNTER — HOSPITAL ENCOUNTER (OUTPATIENT)
Facility: HOSPITAL | Age: 60
Setting detail: OUTPATIENT SURGERY
Discharge: HOME | End: 2025-06-20
Attending: OTOLARYNGOLOGY | Admitting: OTOLARYNGOLOGY
Payer: COMMERCIAL

## 2025-06-20 ENCOUNTER — ANESTHESIA (OUTPATIENT)
Dept: OPERATING ROOM | Facility: HOSPITAL | Age: 60
End: 2025-06-20
Payer: COMMERCIAL

## 2025-06-20 VITALS
WEIGHT: 229.5 LBS | OXYGEN SATURATION: 95 % | BODY MASS INDEX: 36.88 KG/M2 | HEIGHT: 66 IN | HEART RATE: 65 BPM | DIASTOLIC BLOOD PRESSURE: 75 MMHG | SYSTOLIC BLOOD PRESSURE: 144 MMHG | RESPIRATION RATE: 19 BRPM | TEMPERATURE: 96.8 F

## 2025-06-20 DIAGNOSIS — J38.7 LARYNGEAL GRANULOMA: ICD-10-CM

## 2025-06-20 DIAGNOSIS — R49.9 VOICE DISORDER: Primary | ICD-10-CM

## 2025-06-20 PROBLEM — E66.812 CLASS 2 OBESITY IN ADULT: Status: ACTIVE | Noted: 2025-06-20

## 2025-06-20 PROCEDURE — 7100000009 HC PHASE TWO TIME - INITIAL BASE CHARGE: Performed by: OTOLARYNGOLOGY

## 2025-06-20 PROCEDURE — A4649 SURGICAL SUPPLIES: HCPCS | Performed by: OTOLARYNGOLOGY

## 2025-06-20 PROCEDURE — 31622 DX BRONCHOSCOPE/WASH: CPT | Performed by: OTOLARYNGOLOGY

## 2025-06-20 PROCEDURE — 31541 LARYNSCOP W/TUMR EXC + SCOPE: CPT | Performed by: OTOLARYNGOLOGY

## 2025-06-20 PROCEDURE — 2500000005 HC RX 250 GENERAL PHARMACY W/O HCPCS: Performed by: OTOLARYNGOLOGY

## 2025-06-20 PROCEDURE — 7100000010 HC PHASE TWO TIME - EACH INCREMENTAL 1 MINUTE: Performed by: OTOLARYNGOLOGY

## 2025-06-20 PROCEDURE — 2740000001 HC OR 274 NO HCPCS: Performed by: OTOLARYNGOLOGY

## 2025-06-20 PROCEDURE — 2500000004 HC RX 250 GENERAL PHARMACY W/ HCPCS (ALT 636 FOR OP/ED): Mod: JZ | Performed by: ANESTHESIOLOGY

## 2025-06-20 PROCEDURE — 2500000004 HC RX 250 GENERAL PHARMACY W/ HCPCS (ALT 636 FOR OP/ED): Performed by: REGISTERED NURSE

## 2025-06-20 PROCEDURE — L8607 INJ VOCAL CORD BULKING AGENT: HCPCS | Performed by: OTOLARYNGOLOGY

## 2025-06-20 PROCEDURE — 3600000002 HC OR TIME - INITIAL BASE CHARGE - PROCEDURE LEVEL TWO: Performed by: OTOLARYNGOLOGY

## 2025-06-20 PROCEDURE — 3700000002 HC GENERAL ANESTHESIA TIME - EACH INCREMENTAL 1 MINUTE: Performed by: OTOLARYNGOLOGY

## 2025-06-20 PROCEDURE — 3600000007 HC OR TIME - EACH INCREMENTAL 1 MINUTE - PROCEDURE LEVEL TWO: Performed by: OTOLARYNGOLOGY

## 2025-06-20 PROCEDURE — 3700000001 HC GENERAL ANESTHESIA TIME - INITIAL BASE CHARGE: Performed by: OTOLARYNGOLOGY

## 2025-06-20 PROCEDURE — 7100000001 HC RECOVERY ROOM TIME - INITIAL BASE CHARGE: Performed by: OTOLARYNGOLOGY

## 2025-06-20 PROCEDURE — 7100000002 HC RECOVERY ROOM TIME - EACH INCREMENTAL 1 MINUTE: Performed by: OTOLARYNGOLOGY

## 2025-06-20 PROCEDURE — 88305 TISSUE EXAM BY PATHOLOGIST: CPT | Mod: TC,AHULAB | Performed by: OTOLARYNGOLOGY

## 2025-06-20 PROCEDURE — 31599 UNLISTED PROCEDURE LARYNX: CPT | Performed by: OTOLARYNGOLOGY

## 2025-06-20 PROCEDURE — 2500000004 HC RX 250 GENERAL PHARMACY W/ HCPCS (ALT 636 FOR OP/ED): Performed by: ANESTHESIOLOGY

## 2025-06-20 PROCEDURE — 2500000004 HC RX 250 GENERAL PHARMACY W/ HCPCS (ALT 636 FOR OP/ED): Performed by: OTOLARYNGOLOGY

## 2025-06-20 PROCEDURE — 31571 LARYNGOSCOP W/VC INJ + SCOPE: CPT | Performed by: OTOLARYNGOLOGY

## 2025-06-20 DEVICE — PROLARYN GEL IS A WATER-BASED INJECTABLE GEL IMPLANT USED IN THE VOCAL FOLDS TO TREAT VOCAL FOLD INSUFFIENCY. PROLARYN GEL RESORBS WITHIN A PERIOD OF 3-6 MONTHS AND IS A TEMPORARY IMPLANT.
Type: IMPLANTABLE DEVICE | Site: THROAT | Status: FUNCTIONAL
Brand: PROLARYN GEL INJECTABLE IMPLANT

## 2025-06-20 RX ORDER — LABETALOL HYDROCHLORIDE 5 MG/ML
INJECTION, SOLUTION INTRAVENOUS AS NEEDED
Status: DISCONTINUED | OUTPATIENT
Start: 2025-06-20 | End: 2025-06-20

## 2025-06-20 RX ORDER — DEXAMETHASONE SODIUM PHOSPHATE 10 MG/ML
INJECTION INTRAMUSCULAR; INTRAVENOUS AS NEEDED
Status: DISCONTINUED | OUTPATIENT
Start: 2025-06-20 | End: 2025-06-20

## 2025-06-20 RX ORDER — DROPERIDOL 2.5 MG/ML
0.62 INJECTION, SOLUTION INTRAMUSCULAR; INTRAVENOUS ONCE AS NEEDED
Status: DISCONTINUED | OUTPATIENT
Start: 2025-06-20 | End: 2025-06-20 | Stop reason: HOSPADM

## 2025-06-20 RX ORDER — LIDOCAINE HYDROCHLORIDE 20 MG/ML
INJECTION, SOLUTION EPIDURAL; INFILTRATION; INTRACAUDAL; PERINEURAL AS NEEDED
Status: DISCONTINUED | OUTPATIENT
Start: 2025-06-20 | End: 2025-06-20

## 2025-06-20 RX ORDER — SODIUM CHLORIDE, SODIUM LACTATE, POTASSIUM CHLORIDE, CALCIUM CHLORIDE 600; 310; 30; 20 MG/100ML; MG/100ML; MG/100ML; MG/100ML
20 INJECTION, SOLUTION INTRAVENOUS CONTINUOUS
Status: DISCONTINUED | OUTPATIENT
Start: 2025-06-20 | End: 2025-06-20 | Stop reason: HOSPADM

## 2025-06-20 RX ORDER — ONDANSETRON HYDROCHLORIDE 2 MG/ML
4 INJECTION, SOLUTION INTRAVENOUS ONCE AS NEEDED
Status: DISCONTINUED | OUTPATIENT
Start: 2025-06-20 | End: 2025-06-20 | Stop reason: HOSPADM

## 2025-06-20 RX ORDER — ROCURONIUM BROMIDE 10 MG/ML
INJECTION, SOLUTION INTRAVENOUS AS NEEDED
Status: DISCONTINUED | OUTPATIENT
Start: 2025-06-20 | End: 2025-06-20

## 2025-06-20 RX ORDER — PROPOFOL 10 MG/ML
INJECTION, EMULSION INTRAVENOUS AS NEEDED
Status: DISCONTINUED | OUTPATIENT
Start: 2025-06-20 | End: 2025-06-20

## 2025-06-20 RX ORDER — SODIUM CHLORIDE 0.9 G/100ML
INJECTION, SOLUTION IRRIGATION AS NEEDED
Status: DISCONTINUED | OUTPATIENT
Start: 2025-06-20 | End: 2025-06-20 | Stop reason: HOSPADM

## 2025-06-20 RX ORDER — ONDANSETRON HYDROCHLORIDE 2 MG/ML
INJECTION, SOLUTION INTRAVENOUS AS NEEDED
Status: DISCONTINUED | OUTPATIENT
Start: 2025-06-20 | End: 2025-06-20

## 2025-06-20 RX ORDER — FENTANYL CITRATE 50 UG/ML
INJECTION, SOLUTION INTRAMUSCULAR; INTRAVENOUS AS NEEDED
Status: DISCONTINUED | OUTPATIENT
Start: 2025-06-20 | End: 2025-06-20

## 2025-06-20 RX ORDER — MIDAZOLAM HYDROCHLORIDE 1 MG/ML
INJECTION INTRAMUSCULAR; INTRAVENOUS AS NEEDED
Status: DISCONTINUED | OUTPATIENT
Start: 2025-06-20 | End: 2025-06-20

## 2025-06-20 RX ORDER — SODIUM CHLORIDE, SODIUM LACTATE, POTASSIUM CHLORIDE, CALCIUM CHLORIDE 600; 310; 30; 20 MG/100ML; MG/100ML; MG/100ML; MG/100ML
100 INJECTION, SOLUTION INTRAVENOUS CONTINUOUS
Status: DISCONTINUED | OUTPATIENT
Start: 2025-06-20 | End: 2025-06-20 | Stop reason: HOSPADM

## 2025-06-20 RX ORDER — HYDRALAZINE HYDROCHLORIDE 20 MG/ML
5 INJECTION INTRAMUSCULAR; INTRAVENOUS EVERY 30 MIN PRN
Status: DISCONTINUED | OUTPATIENT
Start: 2025-06-20 | End: 2025-06-20 | Stop reason: HOSPADM

## 2025-06-20 RX ORDER — OXYCODONE HYDROCHLORIDE 5 MG/1
5 TABLET ORAL
Status: DISCONTINUED | OUTPATIENT
Start: 2025-06-20 | End: 2025-06-20 | Stop reason: HOSPADM

## 2025-06-20 RX ORDER — DEXAMETHASONE SODIUM PHOSPHATE 10 MG/ML
INJECTION INTRAMUSCULAR; INTRAVENOUS AS NEEDED
Status: DISCONTINUED | OUTPATIENT
Start: 2025-06-20 | End: 2025-06-20 | Stop reason: HOSPADM

## 2025-06-20 RX ADMIN — FENTANYL CITRATE 50 MCG: 50 INJECTION, SOLUTION INTRAMUSCULAR; INTRAVENOUS at 13:33

## 2025-06-20 RX ADMIN — HYDROMORPHONE HYDROCHLORIDE 0.5 MG: 1 INJECTION, SOLUTION INTRAMUSCULAR; INTRAVENOUS; SUBCUTANEOUS at 14:22

## 2025-06-20 RX ADMIN — PROPOFOL 200 MG: 10 INJECTION, EMULSION INTRAVENOUS at 13:33

## 2025-06-20 RX ADMIN — SODIUM CHLORIDE, POTASSIUM CHLORIDE, SODIUM LACTATE AND CALCIUM CHLORIDE: 600; 310; 30; 20 INJECTION, SOLUTION INTRAVENOUS at 13:28

## 2025-06-20 RX ADMIN — MIDAZOLAM HYDROCHLORIDE 2 MG: 1 INJECTION, SOLUTION INTRAMUSCULAR; INTRAVENOUS at 13:28

## 2025-06-20 RX ADMIN — DEXAMETHASONE SODIUM PHOSPHATE 10 MG: 10 INJECTION, EMULSION INTRAMUSCULAR; INTRAVENOUS at 13:33

## 2025-06-20 RX ADMIN — SUGAMMADEX 400 MG: 100 INJECTION, SOLUTION INTRAVENOUS at 14:01

## 2025-06-20 RX ADMIN — LIDOCAINE HYDROCHLORIDE 60 MG: 20 INJECTION, SOLUTION EPIDURAL; INFILTRATION; INTRACAUDAL; PERINEURAL at 13:33

## 2025-06-20 RX ADMIN — ONDANSETRON 4 MG: 2 INJECTION INTRAMUSCULAR; INTRAVENOUS at 13:56

## 2025-06-20 RX ADMIN — ROCURONIUM BROMIDE 50 MG: 10 INJECTION, SOLUTION INTRAVENOUS at 13:33

## 2025-06-20 RX ADMIN — LABETALOL HYDROCHLORIDE 5 MG: 5 INJECTION, SOLUTION INTRAVENOUS at 13:46

## 2025-06-20 RX ADMIN — LABETALOL HYDROCHLORIDE 10 MG: 5 INJECTION, SOLUTION INTRAVENOUS at 13:41

## 2025-06-20 ASSESSMENT — PAIN - FUNCTIONAL ASSESSMENT
PAIN_FUNCTIONAL_ASSESSMENT: 0-10

## 2025-06-20 ASSESSMENT — PAIN SCALES - GENERAL
PAINLEVEL_OUTOF10: 0 - NO PAIN
PAINLEVEL_OUTOF10: 3
PAINLEVEL_OUTOF10: 7

## 2025-06-20 ASSESSMENT — PAIN DESCRIPTION - LOCATION: LOCATION: THROAT

## 2025-06-20 ASSESSMENT — COLUMBIA-SUICIDE SEVERITY RATING SCALE - C-SSRS
1. IN THE PAST MONTH, HAVE YOU WISHED YOU WERE DEAD OR WISHED YOU COULD GO TO SLEEP AND NOT WAKE UP?: NO
6. HAVE YOU EVER DONE ANYTHING, STARTED TO DO ANYTHING, OR PREPARED TO DO ANYTHING TO END YOUR LIFE?: NO
2. HAVE YOU ACTUALLY HAD ANY THOUGHTS OF KILLING YOURSELF?: NO

## 2025-06-20 ASSESSMENT — ENCOUNTER SYMPTOMS
CARDIOVASCULAR NEGATIVE: 1
FEVER: 0
PSYCHIATRIC NEGATIVE: 1

## 2025-06-20 NOTE — ANESTHESIA POSTPROCEDURE EVALUATION
Patient: Scott Leung    Procedure Summary       Date: 06/20/25 Room / Location: [facility] A OR 01 / Virtual U A OR    Anesthesia Start: 1328 Anesthesia Stop: 1414    Procedure: Microdirect Laryngoscopy; Bronchoscopy; CO2 Laser; Biopsy; Injection (Bilateral: Throat) Diagnosis:       Laryngeal granuloma      (Laryngeal granuloma [J38.7])    Surgeons: Warren Delong MD Responsible Provider: Yulissa Roberto MD    Anesthesia Type: general ASA Status: 3            Anesthesia Type: general    Vitals Value Taken Time   /73 06/20/25 14:32   Temp 36.5 °C (97.7 °F) 06/20/25 14:10   Pulse 63 06/20/25 14:37   Resp 17 06/20/25 14:15   SpO2 91 % 06/20/25 14:37   Vitals shown include unfiled device data.    Anesthesia Post Evaluation    Patient location during evaluation: PACU  Patient participation: complete - patient participated  Level of consciousness: awake  Pain management: adequate  Multimodal analgesia pain management approach  Airway patency: patent  Cardiovascular status: hemodynamically stable  Respiratory status: spontaneous ventilation  Hydration status: euvolemic  Postoperative Nausea and Vomiting: none        There were no known notable events for this encounter.

## 2025-06-20 NOTE — ANESTHESIA PROCEDURE NOTES
Airway  Date/Time: 6/20/2025 1:35 PM  Reason: elective    Airway not difficult    Staffing  Performed: CRNA   Authorized by: Yulissa Roberto MD    Performed by: ARACELI Wells-FABI  Patient location during procedure: OR    Patient Condition  Indications for airway management: anesthesia  Patient position: reverse Trendelenburg  MILS maintained throughout  Sedation level: deep     Final Airway Details   Preoxygenated: yes  Final airway type: endotracheal airway  Successful airway: ETT and reinforced tube  Cuffed: yes   Successful intubation technique: video laryngoscopy  Adjuncts used in placement: intubating stylet  Endotracheal tube insertion site: oral  Blade type: Tabor.  Blade size: #3  ETT size (mm): 5.0  Cormack-Lehane Classification: grade I - full view of glottis  Measured from: lips  ETT to lips (cm): 21  Number of attempts at approach: 1

## 2025-06-20 NOTE — OP NOTE
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]     Procedures  Microdirect Laryngoscopy; Bronchoscopy; CO2 Laser; Biopsy; Injection  79695 - MS LARYNGOSCOPY W/WO TRACHEOSCOPY W/MICRO/TELESCOPE    Microdirect Laryngoscopy; Bronchoscopy; CO2 Laser; Biopsy; Injection  69261 - MS BRNCHSC INCL FLUOR GDNCE DX W/CELL WASHG SPX    Microdirect Laryngoscopy; Bronchoscopy; CO2 Laser; Biopsy; Injection  98285 - MS LARGSC W/NJX VOCAL CORD THER W/MICRO/TELESCOPE    Microdirect Laryngoscopy; Bronchoscopy; CO2 Laser; Biopsy; Injection  09645 - MS LARYNGOSCOPY W/BIOPSY MICROSCOPE/TELESCOPE    Microdirect Laryngoscopy; Bronchoscopy; CO2 Laser; Biopsy; Injection  35707 - MS LARGSC EXC JOSÉ&/STRPG CORDS/EPIGL MCRSCP/TLSCP    MS LARGSC W/NJX VOCAL CORD THER W/MICRO/TELESCOPE [[id number]]  Surgeons      * Warren eDlong - Primary    Resident/Fellow/Other Assistant:  Surgeons and Role:  * No surgeons found with a matching role *    Staff:   Relief Circulator: Tiki Guzman Person: Maria Guadalupe  Circulator: Karina    Anesthesia Staff: Anesthesiologist: Yulissa Roberto MD  CRNA: ARACELI Wells-CRNA    Procedure Summary  Anesthesia: General  ASA: III  Estimated Blood Loss: <2 mL  Intra-op Medications:   Administrations occurring from 1255 to 1425 on 25:   Medication Name Total Dose   dexAMETHasone (Decadron) injection 5 mg   sodium chloride 0.9 % irrigation solution 1,000 mL   HYDROmorphone (Dilaudid) injection 0.5 mg 0.5 mg   lactated Ringer's infusion 47.5 mL   dexAMETHasone (Decadron) PF injection 10 mg/mL 10 mg   fentaNYL (Sublimaze) injection 50 mcg/mL 50 mcg   labetalol 5 mg/mL 15 mg   lidocaine PF (Xylocaine-MPF) local injection 2 %  60 mg   midazolam PF (Versed) injection 1 mg/mL 2 mg   ondansetron (Zofran) 2 mg/mL injection 4 mg   propofol (Diprivan) injection 10 mg/mL 200 mg   rocuronium (ZeMuron) 50 mg/5 mL injection 50 mg   sugammadex (Bridion) 200 mg/2 mL injection 400 mg              Anesthesia Record               Intraprocedure I/O Totals       None           Specimen:   ID Type Source Tests Collected by Time   1 : RIGHT VOCAL CORD LESION Tissue VOCAL CORD BIOPSY RIGHT SURGICAL PATHOLOGY EXAM Warren Delong MD 6/20/2025 1356      Implants:  Implants       Type Name Action Serial No.      ENT Implant IMPLANT, PROLARYN GEL, 1.0CC W/NEEDLES - [id number] Implanted               Findings: Granuloma of the right mid TVC, associated with Right TVC scar.  + Feeding vessel. Treated with CO2 laser.  Minor supraglottoplasty performed to reduce scar formation from the right ventricle to the TVC.  Injection 0.5 ml of Decadron (10mg/ml) and 0.3 ml of Prolaryn Gel bilaterally.     Indications: Scott Leung is an 60 y.o. 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     The patient was seen in the preoperative area. The risks, benefits, complications, treatment options, non-operative alternatives, expected recovery and outcomes were discussed with the patient. The possibilities of reaction to medication, pulmonary aspiration, injury to surrounding structures, bleeding, recurrent infection, the need for additional procedures, failure to diagnose a condition, and creating a complication requiring transfusion or operation were discussed with the patient. The patient concurred with the proposed plan, giving informed consent.  The site of surgery was properly noted/marked if necessary per policy. The patient has been actively warmed in preoperative area. Preoperative antibiotics are not indicated. Venous thrombosis prophylaxis have been ordered including bilateral sequential compression devices    Procedure Details: Indications: Hx of  symptoms of voice changes and findings consistent with TVC vs Ventricular granuloma following prior intubation.    Procedure: The patient was brought back to the operating room and transferred to the operating room table.  The patient was pre-oxygenated with 100% oxygen intubated with a laser safe 5-0 ETT.  The bed was turned 90° to the laryngology team.    A dental guard was placed on the upper dentition.  The dedo laryngoscope was introduced transorally along the right side of the tongue.  No concerning masses or lesions were identified in the oral cavity, oropharynx or pharynx.     A telescope was used to inspect the airway.  Photodocumentation was performed.     The dedo laryngoscope was advanced into a slightly supraglottic position to evaluate the airway and soft tissue changes seen previously.  The patient was prepared for CO2 laser use in the airway. Next, the laser was brought into position and laser safety time out was performed ensuring O2 below 35% for the entirity of the case, eye protection and wet towels were placed upon the face. No nitrous was in use. The line of sight laser mounted to the microscope with a micromanipulator was used to excise the lesion.  A portion of the right supraglottis was removed to ensure no scaring occurred between the upper TVC and the ventricle.  The supraglottic tissue (supraglottoplasty) was removed in a wedge resection.     Additional laser was used to ablate a feeder vessel to the lesion and the lesion was passed off for pathology.  Injection of steroid (decdron 10mg/cc x 0.5 cc) into the area the resection was performed.  Due to some perceived scarring of the Right mid-TVC and mild insufficiency on the left, bilateral TVC injection with Prolaryn Gel was performed to improve closure and aid in healing.      The patient was then turned back to the anesthesia team for emergence.  The patient was transferred to the PACU in stable condition.      Disposition: DC home.   Anticipate an improved voice after adequate healing. Voice rest x 3 days.  Follow up with SLP.     Evidence of Infection: No   Complications:  None; patient tolerated the procedure well.    Disposition: PACU - hemodynamically stable.  Condition: stable   Attending Attestation: I performed the procedure.    Warren Delong  Phone Number: [phone]

## 2025-06-20 NOTE — H&P
History Of Present Illness  Scott Leung is a 60 y.o. female presenting with dysphonia.  The patient reports that she has been experiencing dysphonia since surgery in February of 2025. Patient reports having a hx of malignant neoplasm of breast that has previously require surgical removal and reconstructive surgeries.P  bill reports the hoarseness as daily all throughout the day. Patient reports some increased shortness of breath with talking. Patient reports tolerating solids, liquids, and pills when swallowing. Patient denies any symptoms of acid reflux. Patient denies any smoking history.      vocal cord movement was normal  closure was limited due to granuloma tissue  edema was  bilateral vc   lesions were right vc ball-valve granuloma tissue  the subglottis was widely patent  Pharyngeal wall squeeze was normal      Clinical findings notable for right vc ball-valve granuloma tissue. Patient scheduled for reconstructive breast surgery 6/16/25 with LMA.      Past Medical History  Medical History[1]    Surgical History  Surgical History[2]     Social History  She reports that she has never smoked. She has never used smokeless tobacco. She reports current alcohol use of about 8.0 standard drinks of alcohol per week. She reports that she does not use drugs.    Family History  Family History[3]     Allergies  Latex and Tetanus vaccines and toxoid    Review of Systems   Constitutional:  Negative for fever.   Cardiovascular: Negative.  Negative for chest pain.   Genitourinary: Negative.    Skin: Negative.    Psychiatric/Behavioral: Negative.     All other systems reviewed and are negative.       Physical Exam  Constitutional:       General: She is not in acute distress.     Appearance: She is not ill-appearing.   HENT:      Right Ear: External ear normal.      Left Ear: External ear normal.      Nose: Nose normal.      Mouth/Throat:      Mouth: Mucous membranes are moist.   Eyes:      Pupils: Pupils are equal, round,  "and reactive to light.   Pulmonary:      Effort: Pulmonary effort is normal.   Skin:     General: Skin is warm and dry.   Neurological:      General: No focal deficit present.   Psychiatric:         Mood and Affect: Mood normal.          Last Recorded Vitals  Blood pressure 138/87, pulse 61, temperature 36.4 °C (97.5 °F), temperature source Temporal, resp. rate 16, height 1.676 m (5' 6\"), weight 104 kg (229 lb 8 oz), SpO2 98%.  Assessment & Plan  Laryngeal granuloma    Class 2 obesity in adult      Patient and I discussed the risks, benefits and alternatives to surgery and all questions answered.  Cleared to OR.    Warren Delong MD         [1]   Past Medical History:  Diagnosis Date    Arthritis     Breast cancer 02/23/2024    right mastectomy with no further treatment    Depression     HTN (hypertension)     Hyperlipidemia     Hypothyroidism     Insomnia     Joint pain     Low vitamin D level     on daily supplement    Migraines     Obesity     Open wound of breast with complication, right, sequela 08/01/2024    Primary localized osteoarthrosis of left lower leg     Scoliosis 2021    Sensorineural hearing loss (SNHL) of right ear with restricted hearing of left ear     Situational anxiety     TMJ arthropathy    [2]   Past Surgical History:  Procedure Laterality Date    BREAST BIOPSY  2/23/2024    BREAST CAPSULECTOMY Right 08/05/2024    Right Breast Capsulectomy; Replacement of Tissue Expander with Permanent Implant -Right    COLONOSCOPY      COSMETIC SURGERY  Breast Reconstruction    INNER EAR SURGERY Right     KNEE ARTHROSCOPY W/ DEBRIDEMENT Left     KNEE SURGERY Bilateral     MASTECTOMY      MASTECTOMY W/ SENTINEL NODE BIOPSY Right 06/10/2024    with tissue expander    NASAL ENDOSCOPY  2018    OTHER SURGICAL HISTORY  1981,1982,1983    Knee surgery, elbow and tubal ligation    OVARIAN CYST REMOVAL      TONSILLECTOMY      TUBAL LIGATION      also had uterine ablation    ULNAR NERVE TRANSPOSITION Left     WISDOM " TOOTH EXTRACTION  1985   [3]   Family History  Problem Relation Name Age of Onset    Atrial fibrillation Mother Megan     Other (htn) Mother Megan     Multiple myeloma Mother Megan     Peripheral vascular disease Mother Megan     Other (thyroid disorder) Mother Megan     Arthritis Mother Megan     Cancer Mother Megan     Thyroid disease Mother Megan     Hearing loss Mother Megan     Heart disease Mother Megan     Liver cancer Father Kenneth     Cancer Father Kenneth     Atrial fibrillation Sister Tammy     Other (crest) Sister Tammy     Other (pulmonary htn) Sister Tammy     Other (pulmonary occlusive disease) Sister Tammy     Colonic polyp Sister Tammy     Peripheral vascular disease Sister Tammy     Other (thyroid disorder) Sister Tammy     Colon cancer Sister Tammy         detected abnormal cells and had surgery and was told family members should be screened every 5 years    Rheum arthritis Sister Tammy     Thyroid disease Sister Tammy     Atrial fibrillation Brother Jad     Diabetes Brother Jad     Other (cabg) Brother Jad     Peripheral vascular disease Brother Jad     Heart disease Brother Jad     Atrial fibrillation Brother Kenneth     Heart disease Brother Kenneth     Accidental death Brother Kenneth     Atrial fibrillation Brother Sudheer     Diabetes Brother Sudheer     Heart disease Brother Sudheer     Stroke Brother Sudheer     Hypertension Brother Sudheer     Other (CREST) Other      Accidental death Brother Elliot

## 2025-06-20 NOTE — DISCHARGE INSTRUCTIONS
".  Postoperative Care Instructions:  Microdirect Laryngoscopy/Bronchoscopy with Laser Surgery for Laryngeal Mass/Lesions    Postoperative Care:  For your surgery, special microscopic instruments were used and an operating telescope was placed in your mouth to look at your vocal cords and trachea to treat your airway. No external incisions made.      It is normal for your voice to be hoarse for several days or weeks after surgery while the healing process occurs.     Your surgeon may also ask you to perform \"Voice rest\" which means no speaking for the period of time requested. Once you are allowed to start talking, it is very important to use a “conservative” or “confidential voice” after surgery to allow your voice to recover.   This means that you are using your normal voice at a much lower volume than usual, without any straining, yelling, screaming, or singing.  This typically sounds like a soft or breathy whisper.  It is also important to avoid extended periods of voice use.   Do not speak to anyone who is farther than an arm's length away, as this would require you to raise your voice.      It is very important to avoid excessive coughing or throat clearing after surgery, as this will slow down the healing process.  If you have an urge to cough that you cannot control on your own with relaxation techniques, you can purchase an over-the-counter cough suppressant to use, but make sure it does not interact with your other medications.      Finally, make sure to drink plenty of water to stay well hydrated after surgery, as this will help to speed your recovery by keeping your secretions thin and your vocal cords moist.  Use of cough lozenges is also allowed.      Diet: You may resume your normal diet after surgery. You may want to start out with liquids and light meals or soft foods and advance to your usual diet as tolerated.     Our Nurse will contact you a few days after surgery to schedule your follow up " appointment, which is typically 3-6 weeks after surgery.  For any questions or concerns, please call the respective office between the hours of 9am-4pm.   Please call:  Dr. Delong's office @ [phone]  Dr. Castillo's office @ [phone]   Dr. Riojas's office @ [phone]  If issues arise over the weekend or after hours, please call our hospital  at [phone] and ask for the ENT resident on call.    What to Expect Following Surgery:    The following are some symptoms that may follow your recent surgery:    Pain - Some mild pain is normal after surgery.  As adequate pain control is necessary for healing, please take over-the-counter Tylenol or Motrin per the package directions as needed for pain.  Occasionally, a narcotic pain medication is prescribed for your use after surgery.  If this is the case, please take the medication only as directed.  You may need to take an over-the-counter stool softener as narcotic pain medications can cause constipation. Massage, cold packs, relaxation techniques, listening to music, and positive thinking will also help control your postoperative pain.    Taste disturbance and/or tongue numbness - Due to the surgical instruments used during surgery, you may experience tongue numbness and/or taste disturbance after surgery, but this is usually temporary.  It may take 1-4 weeks to completely resolve.  It is also normal for your tongue to feel swollen or bruised after surgery, and this will also resolve in a few days.    Bleeding - For a few days after surgery, it is normal to cough up some blood in your mucus.  However, if you experience continuous bright red bleeding from your mouth or if you are coughing up blood clots, please call your doctor's office immediately.  If you are on any blood thinning medications, such as Aspirin, Plavix, or Coumadin, you may restart them immediately after surgery unless you were specifically told not to do so by your surgeon.    Muscle  aches/soreness - You may experience generalized muscle aches and/or soreness after surgery, and this is a normal effect of anesthesia.  They should completely resolve after a few days.     Swelling/throat tightness - If you were given steroid medication, this can help with swelling and should be taken as directed. The side effects from steroid use is typically minimal when taken for short periods, as used in these cases. If you have questions, please discuss with your pharmacist.

## 2025-06-20 NOTE — ANESTHESIA PREPROCEDURE EVALUATION
Patient: Scott Leung    Procedure Information       Date/Time: 06/20/25 5507    Procedure: Microdirect Laryngoscopy; Bronchoscopy; CO2 Laser; Biopsy; Injection (Bilateral: Throat)    Location: [facility] OR 01 / [facility] OR    Surgeons: Warren Delong MD                                                           Pre-Anesthesia Evaluation      Scott Leung is a 60 y.o. 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 Authorizing Provider   amitriptyline (Elavil) 25 mg tablet Take 1 tablet (25 mg) by mouth as needed at bedtime for sleep. 10/9/24 10/9/25 Yes Emilia Angeles MD   amLODIPine (Norvasc) 10 mg tablet Take 1 tablet (10 mg) by mouth once daily. 1/14/25  Yes Emilia Angeles MD   ASPIRIN LOW-STRENGTH ORAL Take 81 mg by mouth every other day.   Yes Historical Provider, MD   escitalopram (Lexapro) 10 mg tablet Take 0.5 tablets (5 mg) by mouth once daily. 6/13/24  Yes Emilia Angeles MD   exemestane (Aromasin) 25 mg tablet Take 1 tablet (25 mg total) by mouth once daily.  Take after a meal.  Try to take at the same time each day. 4/22/25 4/22/26 Yes Clarence Shore MD   hydrocortisone 2.5 % cream  8/9/21  Yes Historical Provider, MD   levothyroxine (Synthroid, Levoxyl) 25 mcg tablet Take 1 tablet (25 mcg) by mouth once daily in the morning. 1/14/25  Yes Emilia Angeles MD   losartan (Cozaar) 100 mg tablet Take 1 tablet (100 mg) by mouth once daily. 4/24/25  Yes Emilia Angeles MD   propranolol (Inderal) 10 mg tablet Take 1 tablet (10 mg) by mouth 2 times a day. 4/24/25  Yes Emilia Angeles MD   rizatriptan MLT (Maxalt-MLT) 10 mg disintegrating tablet Take 1 tablet (10 mg) by mouth 1 time if needed for migraine. May repeat once in 2 hours if needed (Maximum 2 tablets in 24 hours) 5/26/23  Yes Elizabeth Mijares, DO  "  rosuvastatin (Crestor) 20 mg tablet Take 0.5 tablets (10 mg) by mouth once daily. 1/14/25  Yes Emilia Angeles MD   cholecalciferol (Vitamin D-3) 25 MCG (1000 UT) capsule Take 1 capsule (25 mcg) by mouth once daily.  Patient not taking: Reported on 6/20/2025 6/27/22   Historical Provider, MD   gabapentin (Neurontin) 300 mg capsule Take 1 capsule (300 mg) by mouth every 8 hours for 14 days.  Patient not taking: Reported on 6/20/2025 5/15/25 5/29/25  Shannon Morrison PA-C     No medication comments found.   Visit Vitals  /87   Pulse 61   Temp 36.4 °C (97.5 °F) (Temporal)   Resp 16   Ht 1.676 m (5' 6\")   Wt 104 kg (229 lb 8 oz)   SpO2 98%   BMI 37.04 kg/m²   OB Status Postmenopausal   Smoking Status Never   BSA 2.2 m²     Lab Results   Component Value Date    WBC 7.7 11/26/2024    HGB 14.6 11/26/2024    HCT 43.2 11/26/2024     11/26/2024     Lab Results   Component Value Date    TSH 2.29 11/26/2024    HGBA1C 4.9 11/26/2024    GLUCOSE 121 (H) 11/26/2024     11/26/2024    K 4.5 11/26/2024     11/26/2024    CREATININE 0.94 11/26/2024    BUN 10 11/26/2024    EGFR 70 11/26/2024    CO2 28 11/26/2024    AST 27 11/26/2024    ALT 35 11/26/2024     Lab Results   Component Value Date    STAPHMRSASCR (A) 12/13/2024     Isolated: Methicillin Resistant Staphylococcus aureus (MRSA)             Relevant Problems   Cardiac   (+) Benign essential hypertension   (+) Hyperlipidemia      Neuro   (+) Depression with anxiety   (+) Migraine, unspecified, not intractable, without status migrainosus   (+) Situational anxiety      Endocrine   (+) Class 2 obesity in adult   (+) Hypothyroidism   (+) Impaired fasting glucose      Musculoskeletal   (+) Lumbar stenosis   (+) TMJ arthropathy      HEENT   (+) Sensorineural hearing loss (SNHL) of right ear with restricted hearing of left ear      GYN   (+) Malignant neoplasm of central portion of right breast in female, estrogen receptor positive      Infectious/Inflammatory "   (+) Laryngeal granuloma       Clinical information reviewed:   Tobacco  Allergies  Meds   Med Hx  Surg Hx  OB Status  Fam Hx  Soc   Hx        NPO Detail:  NPO/Void Status  Carbohydrate Drink Given Prior to Surgery? : N  Date of Last Liquid: 06/20/25  Time of Last Liquid: 0915  Date of Last Solid: 06/19/25  Time of Last Solid: 1930  Last Intake Type: Clear fluids         Physical Exam    Airway  Mallampati: II  TM distance: >3 FB  Neck ROM: full  Mouth opening: 3 or more finger widths     Cardiovascular   Rhythm: regular  Rate: normal     Dental - normal exam     Pulmonary Comments: Normal RR  Non-labored respiration    Abdominal            Anesthesia Plan    History of general anesthesia?: yes  History of complications of general anesthesia?: no    ASA 3     general   (GETA Tenax tube with standard ASA monitoring)  intravenous induction   Postoperative pain plan includes opioids.  Anesthetic plan and risks discussed with patient.    Plan discussed with CAA and CRNA.           [1]   Past Medical History:  Diagnosis Date    Arthritis     Breast cancer 02/23/2024    right mastectomy with no further treatment    Depression     HTN (hypertension)     Hyperlipidemia     Hypothyroidism     Insomnia     Joint pain     Low vitamin D level     on daily supplement    Migraines     Obesity     Open wound of breast with complication, right, sequela 08/01/2024    Primary localized osteoarthrosis of left lower leg     Scoliosis 2021    Sensorineural hearing loss (SNHL) of right ear with restricted hearing of left ear     Situational anxiety     TMJ arthropathy    [2]   Past Surgical History:  Procedure Laterality Date    BREAST BIOPSY  2/23/2024    BREAST CAPSULECTOMY Right 08/05/2024    Right Breast Capsulectomy; Replacement of Tissue Expander with Permanent Implant -Right    COLONOSCOPY      COSMETIC SURGERY  Breast Reconstruction    INNER EAR SURGERY Right     KNEE ARTHROSCOPY W/ DEBRIDEMENT Left     KNEE SURGERY  Bilateral     MASTECTOMY      MASTECTOMY W/ SENTINEL NODE BIOPSY Right 06/10/2024    with tissue expander    NASAL ENDOSCOPY  2018    OTHER SURGICAL HISTORY  1981,1982,1983    Knee surgery, elbow and tubal ligation    OVARIAN CYST REMOVAL      TONSILLECTOMY      TUBAL LIGATION      also had uterine ablation    ULNAR NERVE TRANSPOSITION Left     WISDOM TOOTH EXTRACTION  1985   [3]   Social History  Tobacco Use    Smoking status: Never    Smokeless tobacco: Never   Vaping Use    Vaping status: Never Used   Substance Use Topics    Alcohol use: Yes     Alcohol/week: 8.0 standard drinks of alcohol     Types: 2 Glasses of wine, 6 Cans of beer per week     Comment: Varies    Drug use: Never   [4]   Allergies  Allergen Reactions    Latex Rash    Tetanus Vaccines And Toxoid Fever   [5]   Current Facility-Administered Medications:     lactated Ringer's infusion, 20 mL/hr, intravenous, Continuous, Yulissa Roberto MD

## 2025-06-23 ASSESSMENT — PAIN SCALES - GENERAL: PAINLEVEL_OUTOF10: 3

## 2025-06-24 ENCOUNTER — APPOINTMENT (OUTPATIENT)
Dept: PLASTIC SURGERY | Facility: CLINIC | Age: 60
End: 2025-06-24
Payer: COMMERCIAL

## 2025-06-26 LAB
LABORATORY COMMENT REPORT: NORMAL
PATH REPORT.FINAL DX SPEC: NORMAL
PATH REPORT.GROSS SPEC: NORMAL
PATH REPORT.RELEVANT HX SPEC: NORMAL
PATH REPORT.TOTAL CANCER: NORMAL
RESIDENT REVIEW: NORMAL

## 2025-07-02 DIAGNOSIS — C50.111 MALIGNANT NEOPLASM OF CENTRAL PORTION OF RIGHT BREAST IN FEMALE, ESTROGEN RECEPTOR POSITIVE: ICD-10-CM

## 2025-07-02 DIAGNOSIS — Z17.0 MALIGNANT NEOPLASM OF CENTRAL PORTION OF RIGHT BREAST IN FEMALE, ESTROGEN RECEPTOR POSITIVE: ICD-10-CM

## 2025-07-02 DIAGNOSIS — G89.18 POST-OP PAIN: ICD-10-CM

## 2025-07-02 RX ORDER — GABAPENTIN 300 MG/1
300 CAPSULE ORAL EVERY 8 HOURS
Qty: 42 CAPSULE | Refills: 0 | Status: SHIPPED | OUTPATIENT
Start: 2025-07-02 | End: 2025-07-16

## 2025-07-02 RX ORDER — CHLORHEXIDINE GLUCONATE 40 MG/ML
SOLUTION TOPICAL ONCE
Qty: 118 ML | Refills: 0 | Status: SHIPPED | OUTPATIENT
Start: 2025-07-02 | End: 2025-07-09

## 2025-07-02 RX ORDER — SULFAMETHOXAZOLE AND TRIMETHOPRIM 800; 160 MG/1; MG/1
1 TABLET ORAL 2 TIMES DAILY
Qty: 28 TABLET | Refills: 0 | Status: SHIPPED | OUTPATIENT
Start: 2025-07-02 | End: 2025-07-16

## 2025-07-02 RX ORDER — ACETAMINOPHEN 500 MG
1000 TABLET ORAL EVERY 8 HOURS
Qty: 84 TABLET | Refills: 0 | Status: SHIPPED | OUTPATIENT
Start: 2025-07-02 | End: 2025-07-16

## 2025-07-02 RX ORDER — CELECOXIB 200 MG/1
200 CAPSULE ORAL 2 TIMES DAILY
Qty: 28 CAPSULE | Refills: 0 | Status: SHIPPED | OUTPATIENT
Start: 2025-07-02 | End: 2025-07-16

## 2025-07-08 ENCOUNTER — ANESTHESIA EVENT (OUTPATIENT)
Dept: OPERATING ROOM | Facility: HOSPITAL | Age: 60
End: 2025-07-08
Payer: COMMERCIAL

## 2025-07-08 PROBLEM — M41.9 SCOLIOSIS: Status: ACTIVE | Noted: 2025-07-08

## 2025-07-08 PROBLEM — Z86.69 HISTORY OF MIGRAINE HEADACHES: Status: ACTIVE | Noted: 2025-07-08

## 2025-07-08 NOTE — ANESTHESIA PREPROCEDURE EVALUATION
Patient: Scott Leung    Procedure Information       Date/Time: 07/09/25 1100    Procedure: Right Revision of Reconstructed Breast with Implant Insertion (Right: Breast)    Location: [facility] A OR 09 / Virtual [facility] A OR    Surgeons: Danica Torres MD            Relevant Problems   Anesthesia  Mouth soreness after intubation      Cardiac   (+) Benign essential hypertension   (+) Hyperlipidemia      Neuro  insomnia   (+) Depression with anxiety   (+) History of migraine headaches   (+) Situational anxiety      Endocrine   (+) Class 2 obesity in adult   (+) Hypothyroidism      Musculoskeletal   (+) Lumbar stenosis   (+) Primary osteoarthritis of right knee   (+) Scoliosis      HEENT   (+) Mixed hearing loss, bilateral   (+) Sensorineural hearing loss (SNHL)   (+) Sensorineural hearing loss (SNHL) of right ear with restricted hearing of left ear      GYN   (+) Malignant neoplasm of central portion of right breast in female, estrogen receptor positive   (+) Malignant neoplasm of central portion of right female breast                                                         Pre-Anesthesia Evaluation                                         Scott Leung is a 60 y.o. female who presents for the above mentioned procedure due to Breast asymmetry between native breast and reconstructed breast [N65.1]    Medical History[1]  Surgical History[2]  Social History[3]  RX Allergies[4]  Current Medications[5]  Prior to Admission medications    Medication Sig Start Date End Date Taking? Authorizing Provider   acetaminophen (Tylenol Extra Strength) 500 mg tablet Take 2 tablets (1,000 mg) by mouth every 8 hours for 14 days. 7/2/25 7/16/25  Shannon Morrison PA-C   amitriptyline (Elavil) 25 mg tablet Take 1 tablet (25 mg) by mouth as needed at bedtime for sleep. 10/9/24 10/9/25  Emilia Angeles MD   amLODIPine (Norvasc) 10 mg tablet Take 1 tablet (10 mg) by mouth once daily. 1/14/25   Emilia Angeles MD   ASPIRIN LOW-STRENGTH ORAL Take 81 mg by mouth  every other day.    Historical Provider, MD   celecoxib (CeleBREX) 200 mg capsule Take 1 capsule (200 mg) by mouth 2 times a day for 14 days. 7/2/25 7/16/25  Shannon Morrison PA-C   chlorhexidine (Hibiclens) 4 % external liquid Apply topically 1 time for 1 dose. Shower normally. Apply Hibiclens to surgical area from clavicle to hips making sure to apply under the breast and axilla as well as in the belly button.  Do not worry about the back. Do not wash off. 7/2/25 7/2/25  Shannon Morrison PA-C   cholecalciferol (Vitamin D-3) 25 MCG (1000 UT) capsule Take 1 capsule (25 mcg) by mouth once daily.  Patient not taking: Reported on 6/20/2025 6/27/22   Historical Provider, MD   escitalopram (Lexapro) 10 mg tablet Take 0.5 tablets (5 mg) by mouth once daily. 6/13/24   Emilia Angeles MD   exemestane (Aromasin) 25 mg tablet Take 1 tablet (25 mg total) by mouth once daily.  Take after a meal.  Try to take at the same time each day. 4/22/25 4/22/26  Clarence Shore MD   gabapentin (Neurontin) 300 mg capsule Take 1 capsule (300 mg) by mouth every 8 hours for 14 days. 7/2/25 7/16/25  Shannon Morrison PA-C   hydrocortisone 2.5 % cream  8/9/21   Historical Provider, MD   levothyroxine (Synthroid, Levoxyl) 25 mcg tablet Take 1 tablet (25 mcg) by mouth once daily in the morning. 1/14/25   Emilia Angeles MD   losartan (Cozaar) 100 mg tablet Take 1 tablet (100 mg) by mouth once daily. 4/24/25   Emilia Angeles MD   propranolol (Inderal) 10 mg tablet Take 1 tablet (10 mg) by mouth 2 times a day. 4/24/25   Emilia Angeles MD   rizatriptan MLT (Maxalt-MLT) 10 mg disintegrating tablet Take 1 tablet (10 mg) by mouth 1 time if needed for migraine. May repeat once in 2 hours if needed (Maximum 2 tablets in 24 hours) 5/26/23   Elizabeth Mijares DO   rosuvastatin (Crestor) 20 mg tablet Take 0.5 tablets (10 mg) by mouth once daily. 1/14/25   Emilia Angeles MD   sulfamethoxazole-trimethoprim (Bactrim DS) 800-160 mg tablet Take 1 tablet by mouth 2 times a day  "for 14 days. 7/2/25 7/16/25  Shannon Morrison PA-C     No medication comments found.  Visit Vitals  OB Status Postmenopausal   Smoking Status Never                             6/20/2025     3:18 PM 6/20/2025     2:46 PM 6/20/2025     2:45 PM 6/20/2025     2:30 PM 6/20/2025     2:15 PM 6/20/2025     2:10 PM 6/20/2025    12:24 PM   BP REVIEW   BP (ultimate) 144/75 157/83 157/83 143/73 122/71 90/79 138/87     Recent Labs     11/26/24  1002 05/28/24  1210   WBC 7.7 6.4   HGB 14.6 13.3   HCT 43.2 39.8    299   MCV 90 93     Recent Labs     11/26/24  1002 05/28/24  1210   EGFR 70 80   ANIONGAP 15 14   BUN 10 13   CREATININE 0.94 0.84    144   K 4.5 4.3    107   CO2 28 27   GLUCOSE 121* 104*   CALCIUM 10.1 9.4     Recent Labs     11/26/24  1002 09/01/23  1037   HGBA1C 4.9  --    TSH 2.29 1.26     Recent Labs     11/26/24  1002 05/28/24  1210   BILITOT 0.7 0.6   PROT 7.2 7.1   ALBUMIN 4.8 4.6   ALKPHOS 113 80   ALT 35 30   AST 27 25     No results for input(s): \"PROTIME\", \"INR\" in the last 98616 hours.  Lab Results   Component Value Date    TIBC 360 11/26/2024    IRONSAT 25 11/26/2024     Lab Results   Component Value Date    STAPHMRSASCR (A) 12/13/2024     Isolated: Methicillin Resistant Staphylococcus aureus (MRSA)     No results for input(s): \"AMPHETAMINE\", \"MAMPHBLDS\", \"BARBITURATE\", \"BENZODIAZ\", \"BUPRENBLDS\", \"CANNABBLDS\", \"COCBLDS\", \"METHABLDS\", \"OXYBLDS\", \"PCPBLDS\", \"OPIATBLDS\", \"DRBLDCOMM\" in the last 76088 hours.  No results for input(s): \"PHART\", \"SOR7CHE\", \"PO2ART\", \"NBG5CGI\", \"S6NXEFTL\", \"BEART\", \"P6EWVMKY\" in the last 23458 hours.      No results found for: \"ABO\"  EKG No results found for this or any previous visit (from the past 4464 hours).  Ejection Fractions:No results found for: \"EF\"  Echocardiogram   No results found for this or any previous visit from the past 81716 days.     Stress TestingNo results found for this or any previous visit from the past 60302 days.    Cardiac " CatheterizationNo results found for this or any previous visit from the past 90030 days.    Cardiac Scoring   CT heart calcium scoring wo IV contrast 08/03/2022    Narrative  MRN: [id number]  Patient Name: FRANDY HONG    STUDY:  CT CARDIAC SCORING;  8/3/2022 7:43 am    INDICATION:  HTN.    COMPARISON:  None.    ACCESSION NUMBER(S):  [id number]    ORDERING CLINICIAN:  DIONNE SINGER    TECHNIQUE:  Using prospective ECG gating, CT scan of the coronary arteries was  performed without intravenous contrast. Coronary calcium scoring  was  performed according to the method of Agatston.    FINDINGS:  The score and distribution of calcium in the coronary arteries is as  follows:    LM 0,  LAD 0,  LCx 0,  RCA 0,    Total 0    The visualized ascending thoracic aorta measures 3.7 cm in diameter.  The heart is normal in size. No pericardial effusion is present.    No gross evidence of mediastinal or hilar lymphadenopathy or masses  is identified. The visualized segments of the lungs are normally  expanded.    The main pulmonary artery, right and left pulmonary artery are normal  in size.    The visualized subdiaphragmatic structures appear intact.    Impression  1. Coronary artery calcium score of 0*.    *Coronary Artery  Agatston score    Score  risk  Very low 1-99  Mildly increased 100-299  Moderately increased >300  Moderate to severely increased >800    Jennifer et al. JCCT 2016 (http://dx.doi.org/10.1016/j.jcct.2016.11.003)    FELDMAN 10-Year CHD Risk with Coronary Artery Calcification can be  calcuate using link below  [email or web address]  Yovanny et al. JACC 2015 (http://dx.doi.org/10.1016/j.j  acc.[email or web address])    AAA screenNo results found for this or any previous visit from the past 55163 days.    Carotid DopplerNo results found for this or any previous visit from the past 60345 days.    X Ray === 01/17/23 ===    XR LUMBAR SPINE COMPLETE 4+ VIEWS    - Impression -  No acute  "fracture or traumatic malalignment.  Multilevel grade 1 anterolisthesis between L3 and S1 as described  above.  Degenerative changes of the lower lumbar spine.  Pulmonary Function Tests  No results found for: \"FEV1\", \"FVC\", \"EHT0KCE\", \"TLC\", \"DLCO\"   OTHER: No results found for this or any previous visit from the past 1825 days.        Lab Results   Component Value Date    HCGQUANT 3 05/01/2018     The 10-year ASCVD risk score (Bonifacio SPARKS, et al., 2019) is: 5.5%    Values used to calculate the score:      Age: 60 years      Sex: Female      Is Non- : No      Diabetic: No      Tobacco smoker: No      Systolic Blood Pressure: 144 mmHg      Is BP treated: Yes      HDL Cholesterol: 43.3 mg/dL      Total Cholesterol: 138 mg/dL    Code Status: Full Code                   Clinical information reviewed:                   NPO Detail:  No data recorded     Physical Exam    Airway  Mallampati: II     Cardiovascular    Dental   Comments: TMJ arthropathy     Pulmonary    Abdominal            Anesthesia Plan    History of general anesthesia?: yes  History of complications of general anesthesia?: no    ASA 3     general     intravenous induction   Anesthetic plan and risks discussed with patient.           [1]   Past Medical History:  Diagnosis Date    Arthritis     Breast cancer 02/23/2024    right mastectomy with no further treatment    Depression     HTN (hypertension)     Hyperlipidemia     Hypothyroidism     Insomnia     Joint pain     Low vitamin D level     on daily supplement    Migraines     Obesity     Open wound of breast with complication, right, sequela 08/01/2024    Primary localized osteoarthrosis of left lower leg     Scoliosis 2021    Sensorineural hearing loss (SNHL) of right ear with restricted hearing of left ear     Situational anxiety     TMJ arthropathy    [2]   Past Surgical History:  Procedure Laterality Date    BREAST BIOPSY  2/23/2024    BREAST CAPSULECTOMY Right 08/05/2024    " Right Breast Capsulectomy; Replacement of Tissue Expander with Permanent Implant -Right    COLONOSCOPY      COSMETIC SURGERY  Breast Reconstruction    INNER EAR SURGERY Right     KNEE ARTHROSCOPY W/ DEBRIDEMENT Left     KNEE SURGERY Bilateral     MASTECTOMY      MASTECTOMY W/ SENTINEL NODE BIOPSY Right 06/10/2024    with tissue expander    NASAL ENDOSCOPY  2018    OTHER SURGICAL HISTORY  1981,1982,1983    Knee surgery, elbow and tubal ligation    OVARIAN CYST REMOVAL      TONSILLECTOMY      TUBAL LIGATION      also had uterine ablation    ULNAR NERVE TRANSPOSITION Left     WISDOM TOOTH EXTRACTION  1985   [3]   Social History  Tobacco Use    Smoking status: Never    Smokeless tobacco: Never   Vaping Use    Vaping status: Never Used   Substance Use Topics    Alcohol use: Yes     Alcohol/week: 8.0 standard drinks of alcohol     Types: 2 Glasses of wine, 6 Cans of beer per week     Comment: Varies    Drug use: Never   [4]   Allergies  Allergen Reactions    Latex Rash    Tetanus Vaccines And Toxoid Fever   [5] No current facility-administered medications for this encounter.    Current Outpatient Medications:     acetaminophen (Tylenol Extra Strength) 500 mg tablet, Take 2 tablets (1,000 mg) by mouth every 8 hours for 14 days., Disp: 84 tablet, Rfl: 0    amitriptyline (Elavil) 25 mg tablet, Take 1 tablet (25 mg) by mouth as needed at bedtime for sleep., Disp: 30 tablet, Rfl: 1    amLODIPine (Norvasc) 10 mg tablet, Take 1 tablet (10 mg) by mouth once daily., Disp: 90 tablet, Rfl: 3    ASPIRIN LOW-STRENGTH ORAL, Take 81 mg by mouth every other day., Disp: , Rfl:     celecoxib (CeleBREX) 200 mg capsule, Take 1 capsule (200 mg) by mouth 2 times a day for 14 days., Disp: 28 capsule, Rfl: 0    cholecalciferol (Vitamin D-3) 25 MCG (1000 UT) capsule, Take 1 capsule (25 mcg) by mouth once daily. (Patient not taking: Reported on 6/20/2025), Disp: , Rfl:     escitalopram (Lexapro) 10 mg tablet, Take 0.5 tablets (5 mg) by mouth once  daily., Disp: 45 tablet, Rfl: 3    exemestane (Aromasin) 25 mg tablet, Take 1 tablet (25 mg total) by mouth once daily.  Take after a meal.  Try to take at the same time each day., Disp: 90 tablet, Rfl: 4    gabapentin (Neurontin) 300 mg capsule, Take 1 capsule (300 mg) by mouth every 8 hours for 14 days., Disp: 42 capsule, Rfl: 0    hydrocortisone 2.5 % cream, , Disp: , Rfl:     levothyroxine (Synthroid, Levoxyl) 25 mcg tablet, Take 1 tablet (25 mcg) by mouth once daily in the morning., Disp: 90 tablet, Rfl: 3    losartan (Cozaar) 100 mg tablet, Take 1 tablet (100 mg) by mouth once daily., Disp: 90 tablet, Rfl: 0    propranolol (Inderal) 10 mg tablet, Take 1 tablet (10 mg) by mouth 2 times a day., Disp: 180 tablet, Rfl: 1    rizatriptan MLT (Maxalt-MLT) 10 mg disintegrating tablet, Take 1 tablet (10 mg) by mouth 1 time if needed for migraine. May repeat once in 2 hours if needed (Maximum 2 tablets in 24 hours), Disp: 9 tablet, Rfl: 1    rosuvastatin (Crestor) 20 mg tablet, Take 0.5 tablets (10 mg) by mouth once daily., Disp: 45 tablet, Rfl: 3    sulfamethoxazole-trimethoprim (Bactrim DS) 800-160 mg tablet, Take 1 tablet by mouth 2 times a day for 14 days., Disp: 28 tablet, Rfl: 0

## 2025-07-09 ENCOUNTER — ANESTHESIA (OUTPATIENT)
Dept: OPERATING ROOM | Facility: HOSPITAL | Age: 60
End: 2025-07-09
Payer: COMMERCIAL

## 2025-07-09 ENCOUNTER — HOSPITAL ENCOUNTER (OUTPATIENT)
Facility: HOSPITAL | Age: 60
Setting detail: OUTPATIENT SURGERY
Discharge: HOME | End: 2025-07-09
Attending: SURGERY | Admitting: SURGERY
Payer: COMMERCIAL

## 2025-07-09 VITALS
BODY MASS INDEX: 36.14 KG/M2 | RESPIRATION RATE: 16 BRPM | SYSTOLIC BLOOD PRESSURE: 125 MMHG | TEMPERATURE: 97.3 F | DIASTOLIC BLOOD PRESSURE: 72 MMHG | OXYGEN SATURATION: 97 % | WEIGHT: 224.87 LBS | HEIGHT: 66 IN | HEART RATE: 70 BPM

## 2025-07-09 DIAGNOSIS — N65.1 BREAST ASYMMETRY BETWEEN NATIVE BREAST AND RECONSTRUCTED BREAST: ICD-10-CM

## 2025-07-09 PROCEDURE — 3700000002 HC GENERAL ANESTHESIA TIME - EACH INCREMENTAL 1 MINUTE: Performed by: SURGERY

## 2025-07-09 PROCEDURE — 7100000009 HC PHASE TWO TIME - INITIAL BASE CHARGE: Performed by: SURGERY

## 2025-07-09 PROCEDURE — 7100000002 HC RECOVERY ROOM TIME - EACH INCREMENTAL 1 MINUTE: Performed by: SURGERY

## 2025-07-09 PROCEDURE — 2500000004 HC RX 250 GENERAL PHARMACY W/ HCPCS (ALT 636 FOR OP/ED): Performed by: ANESTHESIOLOGIST ASSISTANT

## 2025-07-09 PROCEDURE — 3600000003 HC OR TIME - INITIAL BASE CHARGE - PROCEDURE LEVEL THREE: Performed by: SURGERY

## 2025-07-09 PROCEDURE — C1781 MESH (IMPLANTABLE): HCPCS | Performed by: SURGERY

## 2025-07-09 PROCEDURE — 19380 REVJ RECONSTRUCTED BREAST: CPT | Performed by: PHYSICIAN ASSISTANT

## 2025-07-09 PROCEDURE — 88304 TISSUE EXAM BY PATHOLOGIST: CPT | Performed by: PATHOLOGY

## 2025-07-09 PROCEDURE — 19342 INSJ/RPLCMT BRST IMPLT SEP D: CPT | Performed by: PHYSICIAN ASSISTANT

## 2025-07-09 PROCEDURE — A19380 PR REVISE BREAST RECONSTRUCTION: Performed by: ANESTHESIOLOGY

## 2025-07-09 PROCEDURE — 88300 SURGICAL PATH GROSS: CPT | Mod: TC,AHULAB | Performed by: SURGERY

## 2025-07-09 PROCEDURE — 2500000001 HC RX 250 WO HCPCS SELF ADMINISTERED DRUGS (ALT 637 FOR MEDICARE OP): Performed by: SURGERY

## 2025-07-09 PROCEDURE — 2500000005 HC RX 250 GENERAL PHARMACY W/O HCPCS: Performed by: SURGERY

## 2025-07-09 PROCEDURE — 3700000001 HC GENERAL ANESTHESIA TIME - INITIAL BASE CHARGE: Performed by: SURGERY

## 2025-07-09 PROCEDURE — 2500000001 HC RX 250 WO HCPCS SELF ADMINISTERED DRUGS (ALT 637 FOR MEDICARE OP): Performed by: ANESTHESIOLOGY

## 2025-07-09 PROCEDURE — 2500000004 HC RX 250 GENERAL PHARMACY W/ HCPCS (ALT 636 FOR OP/ED): Mod: JZ | Performed by: ANESTHESIOLOGY

## 2025-07-09 PROCEDURE — 7100000001 HC RECOVERY ROOM TIME - INITIAL BASE CHARGE: Performed by: SURGERY

## 2025-07-09 PROCEDURE — 3600000008 HC OR TIME - EACH INCREMENTAL 1 MINUTE - PROCEDURE LEVEL THREE: Performed by: SURGERY

## 2025-07-09 PROCEDURE — 7100000010 HC PHASE TWO TIME - EACH INCREMENTAL 1 MINUTE: Performed by: SURGERY

## 2025-07-09 PROCEDURE — C1789 PROSTHESIS, BREAST, IMP: HCPCS | Performed by: SURGERY

## 2025-07-09 PROCEDURE — 19380 REVJ RECONSTRUCTED BREAST: CPT | Performed by: SURGERY

## 2025-07-09 PROCEDURE — 2780000003 HC OR 278 NO HCPCS: Performed by: SURGERY

## 2025-07-09 PROCEDURE — 2500000002 HC RX 250 W HCPCS SELF ADMINISTERED DRUGS (ALT 637 FOR MEDICARE OP, ALT 636 FOR OP/ED): Performed by: SURGERY

## 2025-07-09 PROCEDURE — A4649 SURGICAL SUPPLIES: HCPCS | Performed by: SURGERY

## 2025-07-09 PROCEDURE — 15777 ACELLULAR DERM MATRIX IMPLT: CPT | Performed by: SURGERY

## 2025-07-09 PROCEDURE — 2720000007 HC OR 272 NO HCPCS: Performed by: SURGERY

## 2025-07-09 PROCEDURE — 88300 SURGICAL PATH GROSS: CPT | Performed by: PATHOLOGY

## 2025-07-09 PROCEDURE — A19380 PR REVISE BREAST RECONSTRUCTION: Performed by: ANESTHESIOLOGIST ASSISTANT

## 2025-07-09 PROCEDURE — 19342 INSJ/RPLCMT BRST IMPLT SEP D: CPT | Performed by: SURGERY

## 2025-07-09 DEVICE — GALAFLEX 3D SCAFFOLD, 7.5 CM X 21.0 CM, LARGE OVAL (CONTENTS: 1)
Type: IMPLANTABLE DEVICE | Site: BREAST | Status: FUNCTIONAL
Brand: GALAFLEX 3D

## 2025-07-09 RX ORDER — CEFAZOLIN 1 G/1
INJECTION, POWDER, FOR SOLUTION INTRAVENOUS AS NEEDED
Status: DISCONTINUED | OUTPATIENT
Start: 2025-07-09 | End: 2025-07-09

## 2025-07-09 RX ORDER — ROCURONIUM BROMIDE 10 MG/ML
INJECTION, SOLUTION INTRAVENOUS AS NEEDED
Status: DISCONTINUED | OUTPATIENT
Start: 2025-07-09 | End: 2025-07-09

## 2025-07-09 RX ORDER — LIDOCAINE HYDROCHLORIDE 20 MG/ML
INJECTION, SOLUTION EPIDURAL; INFILTRATION; INTRACAUDAL; PERINEURAL AS NEEDED
Status: DISCONTINUED | OUTPATIENT
Start: 2025-07-09 | End: 2025-07-09

## 2025-07-09 RX ORDER — BUPIVACAINE HCL/EPINEPHRINE 0.5-1:200K
VIAL (ML) INJECTION AS NEEDED
Status: DISCONTINUED | OUTPATIENT
Start: 2025-07-09 | End: 2025-07-09 | Stop reason: HOSPADM

## 2025-07-09 RX ORDER — MIDAZOLAM HYDROCHLORIDE 1 MG/ML
INJECTION INTRAMUSCULAR; INTRAVENOUS AS NEEDED
Status: DISCONTINUED | OUTPATIENT
Start: 2025-07-09 | End: 2025-07-09

## 2025-07-09 RX ORDER — LIDOCAINE HYDROCHLORIDE 10 MG/ML
0.1 INJECTION, SOLUTION EPIDURAL; INFILTRATION; INTRACAUDAL; PERINEURAL ONCE
Status: DISCONTINUED | OUTPATIENT
Start: 2025-07-09 | End: 2025-07-09 | Stop reason: HOSPADM

## 2025-07-09 RX ORDER — PROPOFOL 10 MG/ML
INJECTION, EMULSION INTRAVENOUS AS NEEDED
Status: DISCONTINUED | OUTPATIENT
Start: 2025-07-09 | End: 2025-07-09

## 2025-07-09 RX ORDER — ACETAMINOPHEN 325 MG/1
975 TABLET ORAL ONCE
Status: COMPLETED | OUTPATIENT
Start: 2025-07-09 | End: 2025-07-09

## 2025-07-09 RX ORDER — KETOROLAC TROMETHAMINE 30 MG/ML
INJECTION, SOLUTION INTRAMUSCULAR; INTRAVENOUS AS NEEDED
Status: DISCONTINUED | OUTPATIENT
Start: 2025-07-09 | End: 2025-07-09

## 2025-07-09 RX ORDER — MUPIROCIN 20 MG/G
OINTMENT TOPICAL AS NEEDED
Status: DISCONTINUED | OUTPATIENT
Start: 2025-07-09 | End: 2025-07-09 | Stop reason: HOSPADM

## 2025-07-09 RX ORDER — ONDANSETRON HYDROCHLORIDE 2 MG/ML
INJECTION, SOLUTION INTRAVENOUS AS NEEDED
Status: DISCONTINUED | OUTPATIENT
Start: 2025-07-09 | End: 2025-07-09

## 2025-07-09 RX ORDER — PHENYLEPHRINE HCL IN 0.9% NACL 1 MG/10 ML
SYRINGE (ML) INTRAVENOUS AS NEEDED
Status: DISCONTINUED | OUTPATIENT
Start: 2025-07-09 | End: 2025-07-09

## 2025-07-09 RX ORDER — MEPERIDINE HYDROCHLORIDE 25 MG/ML
12.5 INJECTION INTRAMUSCULAR; INTRAVENOUS; SUBCUTANEOUS EVERY 10 MIN PRN
Status: DISCONTINUED | OUTPATIENT
Start: 2025-07-09 | End: 2025-07-09 | Stop reason: HOSPADM

## 2025-07-09 RX ORDER — OXYCODONE HYDROCHLORIDE 5 MG/1
5 TABLET ORAL EVERY 4 HOURS PRN
Status: DISCONTINUED | OUTPATIENT
Start: 2025-07-09 | End: 2025-07-09 | Stop reason: HOSPADM

## 2025-07-09 RX ORDER — METOCLOPRAMIDE HYDROCHLORIDE 5 MG/ML
10 INJECTION INTRAMUSCULAR; INTRAVENOUS ONCE AS NEEDED
Status: DISCONTINUED | OUTPATIENT
Start: 2025-07-09 | End: 2025-07-09 | Stop reason: HOSPADM

## 2025-07-09 RX ORDER — FENTANYL CITRATE 50 UG/ML
INJECTION, SOLUTION INTRAMUSCULAR; INTRAVENOUS AS NEEDED
Status: DISCONTINUED | OUTPATIENT
Start: 2025-07-09 | End: 2025-07-09

## 2025-07-09 RX ORDER — NITROGLYCERIN 20 MG/G
OINTMENT TOPICAL AS NEEDED
Status: DISCONTINUED | OUTPATIENT
Start: 2025-07-09 | End: 2025-07-09 | Stop reason: HOSPADM

## 2025-07-09 RX ORDER — ONDANSETRON HYDROCHLORIDE 2 MG/ML
4 INJECTION, SOLUTION INTRAVENOUS ONCE AS NEEDED
Status: DISCONTINUED | OUTPATIENT
Start: 2025-07-09 | End: 2025-07-09 | Stop reason: HOSPADM

## 2025-07-09 RX ORDER — APREPITANT 40 MG/1
40 CAPSULE ORAL DAILY
Status: DISCONTINUED | OUTPATIENT
Start: 2025-07-09 | End: 2025-07-09 | Stop reason: HOSPADM

## 2025-07-09 RX ORDER — SODIUM CHLORIDE 0.9 G/100ML
INJECTION, SOLUTION IRRIGATION AS NEEDED
Status: DISCONTINUED | OUTPATIENT
Start: 2025-07-09 | End: 2025-07-09 | Stop reason: HOSPADM

## 2025-07-09 RX ORDER — GABAPENTIN 300 MG/1
600 CAPSULE ORAL ONCE
Status: COMPLETED | OUTPATIENT
Start: 2025-07-09 | End: 2025-07-09

## 2025-07-09 RX ADMIN — ONDANSETRON 4 MG: 2 INJECTION, SOLUTION INTRAMUSCULAR; INTRAVENOUS at 13:25

## 2025-07-09 RX ADMIN — MIDAZOLAM HYDROCHLORIDE 2 MG: 1 INJECTION, SOLUTION INTRAMUSCULAR; INTRAVENOUS at 10:37

## 2025-07-09 RX ADMIN — FENTANYL CITRATE 100 MCG: 50 INJECTION, SOLUTION INTRAMUSCULAR; INTRAVENOUS at 10:45

## 2025-07-09 RX ADMIN — PROPOFOL 200 MG: 10 INJECTION, EMULSION INTRAVENOUS at 10:45

## 2025-07-09 RX ADMIN — GABAPENTIN 600 MG: 300 CAPSULE ORAL at 10:09

## 2025-07-09 RX ADMIN — ROCURONIUM BROMIDE 70 MG: 10 INJECTION, SOLUTION INTRAVENOUS at 10:45

## 2025-07-09 RX ADMIN — GLYCOPYRROLATE 0.2 MG: 0.2 INJECTION, SOLUTION INTRAMUSCULAR; INTRAVENOUS at 12:24

## 2025-07-09 RX ADMIN — HYDROMORPHONE HYDROCHLORIDE 0.5 MG: 1 INJECTION, SOLUTION INTRAMUSCULAR; INTRAVENOUS; SUBCUTANEOUS at 14:30

## 2025-07-09 RX ADMIN — ROCURONIUM BROMIDE 20 MG: 10 INJECTION, SOLUTION INTRAVENOUS at 13:08

## 2025-07-09 RX ADMIN — Medication 200 MCG: at 13:14

## 2025-07-09 RX ADMIN — CEFAZOLIN 2 G: 1 INJECTION, POWDER, FOR SOLUTION INTRAMUSCULAR; INTRAVENOUS at 10:48

## 2025-07-09 RX ADMIN — SUGAMMADEX 200 MG: 100 INJECTION, SOLUTION INTRAVENOUS at 14:12

## 2025-07-09 RX ADMIN — LIDOCAINE HYDROCHLORIDE 50 MG: 20 INJECTION, SOLUTION EPIDURAL; INFILTRATION; INTRACAUDAL; PERINEURAL at 10:45

## 2025-07-09 RX ADMIN — Medication 200 MCG: at 12:02

## 2025-07-09 RX ADMIN — APREPITANT 40 MG: 40 CAPSULE ORAL at 10:08

## 2025-07-09 RX ADMIN — ACETAMINOPHEN 975 MG: 325 TABLET ORAL at 10:09

## 2025-07-09 RX ADMIN — Medication 200 MCG: at 13:01

## 2025-07-09 RX ADMIN — ROCURONIUM BROMIDE 20 MG: 10 INJECTION, SOLUTION INTRAVENOUS at 12:29

## 2025-07-09 RX ADMIN — SODIUM CHLORIDE, SODIUM LACTATE, POTASSIUM CHLORIDE, AND CALCIUM CHLORIDE: .6; .31; .03; .02 INJECTION, SOLUTION INTRAVENOUS at 12:02

## 2025-07-09 RX ADMIN — KETOROLAC TROMETHAMINE 30 MG: 30 INJECTION, SOLUTION INTRAMUSCULAR at 13:25

## 2025-07-09 RX ADMIN — DEXAMETHASONE SODIUM PHOSPHATE 8 MG: 4 INJECTION, SOLUTION INTRAMUSCULAR; INTRAVENOUS at 10:50

## 2025-07-09 RX ADMIN — Medication 200 MCG: at 12:40

## 2025-07-09 RX ADMIN — Medication 100 MCG: at 12:24

## 2025-07-09 RX ADMIN — OXYCODONE HYDROCHLORIDE 5 MG: 5 TABLET ORAL at 14:45

## 2025-07-09 RX ADMIN — SODIUM CHLORIDE, SODIUM LACTATE, POTASSIUM CHLORIDE, AND CALCIUM CHLORIDE: .6; .31; .03; .02 INJECTION, SOLUTION INTRAVENOUS at 10:37

## 2025-07-09 RX ADMIN — Medication 200 MCG: at 11:11

## 2025-07-09 RX ADMIN — ROCURONIUM BROMIDE 20 MG: 10 INJECTION, SOLUTION INTRAVENOUS at 11:50

## 2025-07-09 RX ADMIN — GLYCOPYRROLATE 0.2 MG: 0.2 INJECTION, SOLUTION INTRAMUSCULAR; INTRAVENOUS at 10:59

## 2025-07-09 ASSESSMENT — PAIN - FUNCTIONAL ASSESSMENT
PAIN_FUNCTIONAL_ASSESSMENT: 0-10

## 2025-07-09 ASSESSMENT — COLUMBIA-SUICIDE SEVERITY RATING SCALE - C-SSRS
2. HAVE YOU ACTUALLY HAD ANY THOUGHTS OF KILLING YOURSELF?: NO
6. HAVE YOU EVER DONE ANYTHING, STARTED TO DO ANYTHING, OR PREPARED TO DO ANYTHING TO END YOUR LIFE?: NO
1. IN THE PAST MONTH, HAVE YOU WISHED YOU WERE DEAD OR WISHED YOU COULD GO TO SLEEP AND NOT WAKE UP?: NO

## 2025-07-09 ASSESSMENT — PAIN SCALES - GENERAL
PAINLEVEL_OUTOF10: 3
PAINLEVEL_OUTOF10: 0 - NO PAIN
PAINLEVEL_OUTOF10: 2
PAINLEVEL_OUTOF10: 5 - MODERATE PAIN
PAINLEVEL_OUTOF10: 0 - NO PAIN
PAINLEVEL_OUTOF10: 2
PAINLEVEL_OUTOF10: 7

## 2025-07-09 NOTE — DISCHARGE INSTRUCTIONS
Plastic Surgery Post Discharge Instructions  Activity:  Avoiding strenuous activity is critical to your recovery during the immediate post operative period. No pushing, pulling or lifting objects greater than 5 pounds with the right arm.  Avoid raising the right arms above the level of the shoulder. No cooking, cleaning or driving until cleared by Dr. Torres. No yard work. Try to get up and ambulate short distances in your house once an hour every hour throughout the day to reduce swelling and the risk of postoperative blood clots. Avoid walking further distances until cleared at your post operative visit.     No showering. You may sponge bath following discharge but do not get your surgical dressings wet. Once your surgical dressings are removed, avoid soaking or submerging surgical incisions/sites until they are fully healed.    Surgical Site/Wound Care:  Prevena/wound vac: Please allow Prevena incisional wound vac dressing to remain in place until output follow-up with plastic surgery. Do not get the Prevena dressing wet or submerge under water. No showering while the wound vac is in place but you may sponge bath. When resting, please make sure to plug in the device with the supplied power cord to maintain the battery. The device has a power button at the center which is encircled by green lights. There will be one less light illuminated every other day (every 48 hrs) which is to be expected, and signifies the count down of the duration of the vac device. If you experience any issues with your wound vac including alarms, malfunction or dressing issues please refer to the provided instruction manual or contact the plastic surgery office at [phone].     Drain care:  You are being discharged with 2 drains. To empty the drain, open the cap, tip into cup and squeeze to empty. Squeeze drain flat then replace the cap.  Please empty the drain and record its output 3 times a day and bring these numbers to your follow  up appointment.  The drain output should decrease and the color of the drainage should become lighter (red to pink to yellow).  This drain is sutured into place.  Keep the dressing around the drain clean and intact. Avoid letting the drain hang to gravity. Call the office if you notice drastic changes in drain output, bloody drain output, or redness/drainage around insertion site.    Nutrition:  You may resume a regular diet following surgery. Ensure that you are drinking an adequate amount of fluids to maintain hydration.     It is essential to ensure you are eating adequate protein to promote wound healing. We recommend 120g of protein daily.     Medication Instructions:  You may resume use of your home prescribed medications as previously directed following discharge from the hospital. If you were taking medications prior to your surgery and they are not listed on your discharge homegoing instructions medications list, consult your MD before you resume these medications.    Please take your prescribed medications as instructed during your pre-operative visit. If you have any questions regarding your post op medications, please contact our Care Coordinator, Tara [phone], Option 6).     Remember when taking Acetaminophen, do NOT exceed more than 1000 milligrams (mg) per dose or more than 4000 mg total per day. Taking too much Acetaminophen at one time can damage your liver. Call your MD if you have any questions about your medications. To prevent constipation while taking narcotic pain medications, please utilize your prescribed bowel regimen, ensure that you drink plenty of water, eat fiber rich foods (a good source is fruit) and increase activity progressively.    Call Physician If:  Contact the plastic surgery office for any questions and/or concerns regarding the surgical incision/site.  1. [phone], Option 6 if Monday-Friday (8 a.m. - 4:30 p.m.)  2. [phone] and ask for the Plastic Surgery  team on call provider if after hours or on weekends    Call your MD or seek immediate medical attention if you experience any of the following symptoms:  1. Fever of 101.5 (38.5 C) or greater  2. Pain not controlled with prescribed pain medications  3. Uncontrolled nausea and/or vomiting  4. Drainage or swelling around your incisions and/or surgical sites   5. Separation of incisions, or tearing of the incision line  6. Large fluid collection under or around the incision or flap sites   7. Flap discoloration (including darkened appearance)  8. Difficulty breathing  9. Swelling, pain, heat and/or redness in your legs and/or calves  10. Inability to tolerate diet/fluid intake      Follow-up/Post Discharge Appointments:  Follow-up care is a key part of your treatment and safety. It is very important that you maintain follow-up care as directed so that your surgical site heals properly and does not lead to problems. Always carry a current medication list with you and bring it to ALL healthcare Provider visits. Be sure to maintain follow up with plastic surgery at your scheduled appointment. If you are unable to keep your appointment, or need to reschedule please contact our office at [phone].     You are scheduled for follow-up on: 7/22/2025 . Please bring your pink surgical bra to your follow-up appointment.

## 2025-07-09 NOTE — OP NOTE
Right Revision of Reconstructed Breast with Implant Insertion (R) Operative Note     Date: 2025  OR Location: [facility] OR    Name: Scott Leung, : 1965, Age: 60 y.o., MRN: [id number], Sex: female    Diagnosis  Pre-op Diagnosis      * Breast asymmetry between native breast and reconstructed breast [N65.1] Post-op Diagnosis     * Breast asymmetry between native breast and reconstructed breast [N65.1]     Procedures  Right Revision of Reconstructed Breast with:  1)  Exchange of implant  51856 - DE REMOVAL INTACT BREAST IMPLANT  22470 - DE INSJ/RPLCMT BREAST IMPLANT SEP DAY MASTECTOMY    2) Placement of biologic mesh:  33694 - DE IMPLNT BIO IMPLNT FOR SOFT TISSUE REINFORCEMENT    3) Recreation of lateral and inferior inframammary fold  54900 - Partial capsulectomy and Capsullarrhaphy    4) Rearrangement, excision and closure of inferior pole mastectomy skin  97810 - DE REVISION OF RECONSTRUCTED BREAST    Surgeons      * Danica Torres - Primary    Resident/Fellow/Other Assistant:  Shannon Morrison PA-C, First Assist    There was no skilled surgical resident assistance available.  The Surgical Assistant was necessary to assist due to the nature of the case and difficulty.  Specifically, the SALOMON was assisted with soft tissue handling, retraction, hemostasis and closure in order to successfully perform and complete the procedure.    Staff:   Surgical Assistant:   Ihsan: Rosy Guzman Person: Cinthia Walton Scrub: Semaj Walton Circulator: Bouchra    Anesthesia Staff: Anesthesiologist: Yulissa Roberto MD; Alli Painting MD  C-AA: DIRK Rosales; DIRK Phoenix; DIRK Nunez    Procedure Summary  Anesthesia: General  ASA: III  Estimated Blood Loss: 20 mL  Intra-op Medications:   Administrations occurring from 1100 to 1325 on 25:   Medication Name Total Dose   BUPivacaine-EPINEPHrine (Marcaine w/EPI) 0.5 %-1:200,000 injection 54 mL   sodium chloride 0.9 % irrigation solution 1,000 mL    glycopyrrolate PF (Robinul) injection 0.2 mg   ketorolac (Toradol) injection 30 mg 30 mg   LR bolus Cannot be calculated   ondansetron 2 mg/mL 4 mg   phenylephrine 100 mcg/mL syringe 10 mL (prefilled) 1,100 mcg   rocuronium (ZeMuron) 50 mg/5 mL injection 60 mg              Anesthesia Record               Intraprocedure I/O Totals          Intake    LR bolus 1500.00 mL    Total Intake 1500 mL       Output    Est. Blood Loss 20 mL    Total Output 20 mL       Net    Net Volume 1480 mL          Specimen:   ID Type Source Tests Collected by Time   1 : RIGHT BREAST CAPSULE Tissue BREAST CAPSULE RIGHT SURGICAL PATHOLOGY EXAM Rosy Ariza V, DEVON 7/9/2025 1113   2 : RIGHT BREAST IMPLANT Tissue BREAST IMPLANT RIGHT SURGICAL PATHOLOGY EXAM Rosy TINOCO RN 7/9/2025 1208         Drains and/or Catheters:   Closed/Suction Drain Lateral RUQ Bulb 19 Fr. (Active)       Closed/Suction Drain Lateral RUQ Bulb 19 Fr. (Active)     Implants:  Implants       Type Name Action Serial No.      Mesh GALAFLEX 3D SCAFFOLD Implanted NA     Breast Implant MENTOR MEMORYGEL BREAST IMPLANT  475 ml Moderate Plus Profile Implanted [id number]             Indications: Scott Leung is an 60 y.o. female who is having surgery for Breast asymmetry between native breast and reconstructed breast [N65.1].  She is a history of right breast cancer with a mastectomy and immediate reconstruction with a tissue expander.  Postoperatively her course was complicated by chronic seroma and near extrusion of her tissue expander.  This prompted a early exchange from tissue expander to small implant.  She then had a second stage reconstruction with a contralateral mastopexy and exchange of implant for better match of her chest wall and contralateral breast.  Unfortunately she has complete loss of the inferior and lateral inframammary fold and she returns now for revision of her right reconstruction.  This will involve implant exchange and placement of biologic mesh to  help support the implant as well as define her inframammary fold all in order to create the proper footprint for the reconstruction.    The patient was seen in the preoperative area. The risks, benefits, complications, treatment options, non-operative alternatives, expected recovery and outcomes were discussed with the patient.  The risks of the procedure were discussed with her prior to surgery.  These include but are not limited to the risks of anesthesia, infection, bleeding, pain, need for further procedures, hematoma, seroma, wound healing complications, and asymmetry.  The possibilities of reaction to medication, pulmonary aspiration, injury to surrounding structures, bleeding, recurrent infection, the need for additional procedures, failure to diagnose a condition, and creating a complication requiring transfusion or operation were discussed with the patient. The patient concurred with the proposed plan, giving informed consent.  The site of surgery was properly noted/marked if necessary per policy. The patient has been actively warmed in preoperative area. Preoperative antibiotics have been ordered and given within 1 hours of incision. Venous thrombosis prophylaxis have been ordered including bilateral sequential compression devices    Procedure Details: The patient was identified and marked in the upright and standing position.  She was taken to the operative room and placed in the supine position.  A preoperative time was performed identifying the patient, procedeure and laterality.  An atraumatic endotracheal intubation was performed by the anesthesia team.  Her arms were padded and carefully secured to the arm boards, abducted less than 90 degrees. She was prepped and draped in the usual sterile fashion.    I incised just inferior to her medial inframammary fold incision and dissected down through the subcutaneous layer to the capsule.  I opened a large piece of contoured GalaFlex and jenny the footprint  on the inferior lateral mastectomy skin.  I then started to dissect capsule off the inferior and lateral mastectomy skin so that the gala flex would incorporate.  At approximately the 7:00 on the breast the dermis and capsule were both very thin.  This correlated to her wide and incision on the inferior pole breast.  2 small through and through button holes were made just medial to the incision.  The area lateral to the incision was also thin and had slightly the venous congested perfusion.  I removed her implant which was an intact smooth round gel implant 600 cc high-profile.  I continue to remove the capsule off the thinner tissue using tenotomy scissors.  All of the capsule was sent to permanent pathology.  As I was dissecting everything it became clear that she had complete loss of her lateral and inframammary fold.  I marked where the lateral and inferior borders of the fold should be in order to create the proper breast footprint.  I then removed the capsule from this area and recreated the lateral and inferior fold with a capsulorrhaphy using interrupted 2-0 Vicryl figure-of-eight's followed by running locking 0 Vicryl Prolene.      I addressed the attenuated areas at the lower pole by imbricating the tissue around her vertical incision.  This also helped support the implant in the higher position.  I marked and sharply de-epithelialized this area.  In the area overlapping the attenuated area, the de-epithelialized nation was basically through and through because the tissue was so thin here.  I temporarily closed this incision with 3-0 Monocryl interrupted buried dermals.  This incision connected to the lateral aspect of her inframammary fold incision.    The gala flex, which had been soaking in Irrisept, was then secured to the inframammary fold with interrupted 2-0 PDS.  The implant was placed back into the pocket to be used as a sizer.  I marked where the superior edge of the gala flex should reach when  the patient was in the upright position and the implant positioned in the proper footprint.  I once more took out the implant and secured the superior edge of the gala flex to the edge of the capsule where I had marked.  I placed  two 19 Jordanian Castillo drain.  The posterior drain run superiorly and the anterior drain runs laterally and then curves down inferiorly.  I placed the 600 cc implant back into the pocket and set the patient up.  Now with the inframammary fold securely recreated and the implant supported with the mesh, the implant was actually too big for symmetry on the left side.  There was too much projection and too much superior pole fullness.  Therefore I tried a 475 cc moderate plus profile sizer.  This matched the base width of the 600 cc high-profile implant but had a lower projection.  I set the patient up and confirmed good symmetry.    With the lower projection implant, the mastectomy skin in the lower pole needed to be redraped.  I opened up her vertical incision once more.  I advanced the inferior lateral skin superiorly such that the triple point was moved to approximately 1 cm from the superior edge of the vertical incision.   I then imbricated the lateral tissue to address the dogear and the medial aspect of the incision continued to her medial inframammary fold incision.  This addressed the majority of the attenuated skin in the inferior pole. I marked and sharply de-epithelialized the planned buried tissues.  I was able to advanced subcutaneous tissue from the inferior flap underneath the residual attenuated area superiorly.  The incisions were closed with buried interrupted dermals using 3-0 Monocryl's.  I left the medial portion of the incision open.  Through this the sizer was removed.  The pocket and mesh were irrigated with a bottle of Irrisept.  It was then soaked for approximately 10 minutes with Irrisept.   54 cc of 0.5% Marcaine with epinephrine was diluted with 50 cc of normal  saline.   I used this mixture on to place a right chest wall block for post operative pain control.  The site was then drained and prepped and draped with sterile towels and Irrisept.  I changed gloves and opened a [place] smooth round 475 cc moderate plus implant, bathed it with Irrisept and placed in the pocket using a Lucia funnel.  I then closed the medial portion of the incision in multiple layers with 3-0 Monocryl in the subcutaneous layer as well as interrupted buried dermals.  The tip of the mastectomy flap was marked and buried in the closure to get a smooth closure.  The incisions were then completed with 5-0 Prolene simple interrupted's.  The incisions were dressed with mupirocin.  Nitropaste were placed over the skin at triple point.  A Prevena Pat dressing was placed and CHG Tegaderm dressings placed over the drains.  The patient tolerated the procedure well and she was woken up, extubated, and taken to the recovery room in good condition.      Evidence of Infection: No   Complications:  None; patient tolerated the procedure well.    Disposition: PACU - hemodynamically stable.  Condition: stable       Attending Attestation: I performed the procedure.    Danica Torres  Phone Number: [phone]

## 2025-07-09 NOTE — BRIEF OP NOTE
Date: 2025  OR Location: [facility] OR    Name: Scott Leung, : 1965, Age: 60 y.o., MRN: [id number], Sex: female    Diagnosis  Pre-op Diagnosis      * Breast asymmetry between native breast and reconstructed breast [N65.1] Post-op Diagnosis     * Breast asymmetry between native breast and reconstructed breast [N65.1]     Procedures  Right Revision of Reconstructed Breast with Implant Insertion  20774 - NV REMOVAL INTACT BREAST IMPLANT    Right Revision of Reconstructed Breast with Implant Insertion  40756 - NV INSJ/RPLCMT BREAST IMPLANT SEP DAY MASTECTOMY    Right Revision of Reconstructed Breast with Implant Insertion  72780 - NV IMPLNT BIO IMPLNT FOR SOFT TISSUE REINFORCEMENT    Right Revision of Reconstructed Breast with Implant Insertion  73845 - NV REVISION OF RECONSTRUCTED BREAST      Surgeons      * Danica Torres - Primary    Resident/Fellow/Other Assistant:  Shannon Morrison PA-C    Staff:   Surgical Assistant:   Circulator: Rosy  Scrub Person: Cinthia  Relief Scrub: Semaj  Relief Circulator: Bouchra    Anesthesia Staff: Anesthesiologist: Yulissa Roberto MD; Alli Painting MD  C-AA: DIRK Rosales; DIRK Phoenix; DIRK Nunez    Procedure Summary  Anesthesia: General  ASA: III  Estimated Blood Loss: 20 mL  Intra-op Medications:   Administrations occurring from 1100 to 1325 on 25:   Medication Name Total Dose   BUPivacaine-EPINEPHrine (Marcaine w/EPI) 0.5 %-1:200,000 injection 54 mL   sodium chloride 0.9 % irrigation solution 1,000 mL   glycopyrrolate PF (Robinul) injection 0.2 mg   ketorolac (Toradol) injection 30 mg 30 mg   LR bolus Cannot be calculated   ondansetron 2 mg/mL 4 mg   phenylephrine 100 mcg/mL syringe 10 mL (prefilled) 1,100 mcg   rocuronium (ZeMuron) 50 mg/5 mL injection 60 mg              Anesthesia Record               Intraprocedure I/O Totals          Intake    LR bolus 1500.00 mL    Total Intake 1500 mL       Output    Est. Blood Loss 20 mL    Total Output  20 mL       Net    Net Volume 1480 mL          Specimen:   ID Type Source Tests Collected by Time   1 : RIGHT BREAST CAPSULE Tissue BREAST CAPSULE RIGHT SURGICAL PATHOLOGY EXAM Rosy TINOCO RN 7/9/2025 1113   2 : RIGHT BREAST IMPLANT Tissue BREAST IMPLANT RIGHT SURGICAL PATHOLOGY EXAM Rosy TINOCO RN 7/9/2025 1209                  Findings: see full operative report    Complications:  None; patient tolerated the procedure well.     Disposition: PACU - hemodynamically stable.  Condition: stable  Specimens Collected:   ID Type Source Tests Collected by Time   1 : RIGHT BREAST CAPSULE Tissue BREAST CAPSULE RIGHT SURGICAL PATHOLOGY EXAM Rosy TINOCO RN 7/9/2025 1113   2 : RIGHT BREAST IMPLANT Tissue BREAST IMPLANT RIGHT SURGICAL PATHOLOGY EXAM Rosy TINOOC RN 7/9/2025 1209     Attending Attestation: A qualified resident physician was not available.    TANO Garcia  Phone Number: [phone]

## 2025-07-09 NOTE — INTERVAL H&P NOTE
H&P reviewed. The patient was examined and there are no changes to the H&P.    Shannon Morrison PA-C

## 2025-07-09 NOTE — ANESTHESIA PROCEDURE NOTES
Airway  Date/Time: 7/9/2025 10:47 AM  Reason: elective    Airway not difficult    Staffing  Performed: DIRK   Authorized by: Alli Painting MD    Performed by: DIRK Nunez  Patient location during procedure: OR    Patient Condition  Indications for airway management: anesthesia and airway protection  Patient position: sniffing  MILS not maintained throughout  Planned trial extubation  Sedation level: deep     Final Airway Details   Preoxygenated: yes  Final airway type: endotracheal airway  Successful airway: ETT  Cuffed: yes   Successful intubation technique: video laryngoscopy  Adjuncts used in placement: intubating stylet  Endotracheal tube insertion site: oral  Blade: Irina  Blade size: #3  ETT size (mm): 6.5  Cormack-Lehane Classification: grade I - full view of glottis  Placement verified by: chest auscultation, capnometry and palpation of cuff   Measured from: teeth  ETT to teeth (cm): 22  Number of attempts at approach: 1

## 2025-07-10 NOTE — ANESTHESIA POSTPROCEDURE EVALUATION
Patient: Scott Leung    Procedure Summary       Date: 07/09/25 Room / Location: [facility] OR 09 / [facility] OR    Anesthesia Start: 1037 Anesthesia Stop: 1425    Procedure: Right Revision of Reconstructed Breast with Implant Insertion (Right: Breast) Diagnosis:       Breast asymmetry between native breast and reconstructed breast      (Breast asymmetry between native breast and reconstructed breast [N65.1])    Surgeons: Danica Torres MD Responsible Provider: Yulissa Roberto MD    Anesthesia Type: general ASA Status: 3            Anesthesia Type: general    Vitals Value Taken Time   /83 07/09/25 15:01   Temp 36.2 °C (97.2 °F) 07/09/25 15:00   Pulse 65 07/09/25 15:04   Resp 20 07/09/25 15:00   SpO2 99 % 07/09/25 15:04   Vitals shown include unfiled device data.    Anesthesia Post Evaluation    Patient location during evaluation: PACU  Patient participation: complete - patient participated  Level of consciousness: awake  Pain management: adequate  Multimodal analgesia pain management approach  Airway patency: patent  Cardiovascular status: hemodynamically stable  Respiratory status: spontaneous ventilation  Hydration status: euvolemic  Postoperative Nausea and Vomiting: none        No notable events documented.

## 2025-07-11 ENCOUNTER — TELEPHONE (OUTPATIENT)
Dept: PLASTIC SURGERY | Facility: CLINIC | Age: 60
End: 2025-07-11
Payer: COMMERCIAL

## 2025-07-11 NOTE — TELEPHONE ENCOUNTER
Outgoing postoperative follow up call. She is doing well overall. Pain is well controlled, ELIAS drain output down trending appropriately. She has our office and after hours numbers and will call with any questions or concerns.

## 2025-07-16 ENCOUNTER — TELEPHONE (OUTPATIENT)
Dept: PLASTIC SURGERY | Facility: CLINIC | Age: 60
End: 2025-07-16

## 2025-07-16 ENCOUNTER — OFFICE VISIT (OUTPATIENT)
Dept: PLASTIC SURGERY | Facility: CLINIC | Age: 60
End: 2025-07-16
Payer: COMMERCIAL

## 2025-07-16 VITALS
HEART RATE: 73 BPM | SYSTOLIC BLOOD PRESSURE: 151 MMHG | BODY MASS INDEX: 36.41 KG/M2 | HEIGHT: 67 IN | WEIGHT: 232 LBS | DIASTOLIC BLOOD PRESSURE: 88 MMHG | RESPIRATION RATE: 16 BRPM

## 2025-07-16 DIAGNOSIS — Z98.890 S/P BREAST RECONSTRUCTION: Primary | ICD-10-CM

## 2025-07-16 PROCEDURE — 3079F DIAST BP 80-89 MM HG: CPT

## 2025-07-16 PROCEDURE — 3008F BODY MASS INDEX DOCD: CPT

## 2025-07-16 PROCEDURE — 3077F SYST BP >= 140 MM HG: CPT

## 2025-07-16 PROCEDURE — 99024 POSTOP FOLLOW-UP VISIT: CPT

## 2025-07-16 ASSESSMENT — ENCOUNTER SYMPTOMS
ALLERGIC/IMMUNOLOGIC NEGATIVE: 1
EYES NEGATIVE: 1
CONSTITUTIONAL NEGATIVE: 1
RESPIRATORY NEGATIVE: 1
NEUROLOGICAL NEGATIVE: 1
CARDIOVASCULAR NEGATIVE: 1
PSYCHIATRIC NEGATIVE: 1
GASTROINTESTINAL NEGATIVE: 1

## 2025-07-16 ASSESSMENT — PAIN SCALES - GENERAL: PAINLEVEL_OUTOF10: 4

## 2025-07-16 NOTE — PROGRESS NOTES
Department of Plastic and Reconstructive Surgery  Clinic Visit Note    Patient Name: Scott Leung  MRN: [id number]  Date:  07/16/25     History of Present Illness  Scott Leung is a 60 y.o. female with a past medical history of HTN, HLD, hypothyroidism and history or Right Breast Cancer s/p  R mastectomy and TE placement in summer 2024. She unfortunately developed multiple  breast seromas that required aspiration in office. During this time she was placed of Levaquin for Antibiotic prophylaxis. Second surgery included TE exchange for implant , capsulectomy and washout on 8/5/2024. Third surgery included Right breast Implant Replacement and Left Breast Mastopexy for symmetry on 2/10/2025. Fourth surgery included bilateral breast revision on 6/16/2025    Subjective  She reports to clinic today due to alarming of wound vac that started this AM. She called our office and was instructed to be seen today for troubleshooting. When she arrived the wound vac was not holding suction. Jon x 2 drains to right upper abdomen were to suction with serous output.  She denies, fevers, chills, N/V/D.     Medical History[1]  Surgical History[2]  Allergies[3]  Current Medications[4]   Family History[5]  Social History[6]  Review of Systems   Constitutional: Negative.    HENT: Negative.     Eyes: Negative.    Respiratory: Negative.     Cardiovascular: Negative.    Gastrointestinal: Negative.    Genitourinary: Negative.    Skin: Negative.    Allergic/Immunologic: Negative.    Neurological: Negative.    Psychiatric/Behavioral: Negative.          Objective    There were no vitals taken for this visit.       Physical Exam  Constitutional: A&Ox3, calm and cooperative, NAD.  Eyes: EOMI, clear sclera.  ENMT: Moist mucous membranes, no apparent injuries or lesions.  Head/Neck: NC/AT. Neck supple.   Cardiovascular: Normal rate and regular rhythm.   Respiratory/Thorax: Regular respirations on RA.  Gastrointestinal: Abdomen soft, non-tender.    Genitourinary: Voiding independently.   Extremities: No peripheral edema. Moves all 4 extremities actively, strength appropriate.   Neurological: A&Ox3.   Psychological: Appropriate mood and behavior.   Skin: Warm and dry, no rashes.      Focused exam:     Right Breast: There was no palpable seroma or hematoma on exam of right breast. Incision is c/d/I with prolene sutures intact. There is no active wound dehiscence, drainage or signs of infection. There is improving ecchymosis on the inferior pole of the right breast and centrally located at the medial sternum region.     Left breast: There was no palpable seroma or hematoma on exam of right breast. Incision is healing well and following post op course. There is no active wound dehiscence, drainage or signs of infection.     Diagnostics   No results found for this or any previous visit (from the past 24 hours).  Imaging  No results found.    Cardiology, Vascular, and Other Imaging  No other imaging results found for the past 7 days      Assessment  Scott Leung is a 60 y.o. female with a past medical history of HTN, HLD, hypothyroidism and history or Right Breast Cancer s/p  R mastectomy and TE placement in summer 2024. She unfortunately developed multiple  breast seromas that required aspiration in office. During this time she was placed of Levaquin for Antibiotic prophylaxis. Second surgery included TE exchange for implant , capsulectomy and washout on 8/5/2024. Third surgery included Right breast Implant Replacement and Left Breast Mastopexy for symmetry on 2/10/2025. Fourth surgery included bilateral breast revision with fat grafting on 6/16/2025. Today she presents to clinic for concerns of alarming wound vac     Plan  - Post Op  - Doing well and following normal post operative course  - Wound vac to right breast was replaced without leaks or malfunctions.  - Continue protein intake  - Continue activity restrictions  - Continue to offload pressure on  breasts  - RTC this coming Tuesday for follow up and possible removal of select prolene sutures        Fadumo Humphries PA-C   Plastic and Reconstructive Surgery          [1]   Past Medical History:  Diagnosis Date    Arthritis     Breast cancer 02/23/2024    right mastectomy with no further treatment    Depression     HTN (hypertension)     Hyperlipidemia     Hypothyroidism     Insomnia     Joint pain     Low vitamin D level     on daily supplement    Migraines     Obesity     Open wound of breast with complication, right, sequela 08/01/2024    Primary localized osteoarthrosis of left lower leg     Scoliosis 2021    Sensorineural hearing loss (SNHL) of right ear with restricted hearing of left ear     Situational anxiety     TMJ arthropathy    [2]   Past Surgical History:  Procedure Laterality Date    BREAST BIOPSY  2/23/2024    BREAST CAPSULECTOMY Right 08/05/2024    Right Breast Capsulectomy; Replacement of Tissue Expander with Permanent Implant -Right    COLONOSCOPY      COSMETIC SURGERY  Breast Reconstruction    INNER EAR SURGERY Right     KNEE ARTHROSCOPY W/ DEBRIDEMENT Left     KNEE SURGERY Bilateral     MASTECTOMY      MASTECTOMY W/ SENTINEL NODE BIOPSY Right 06/10/2024    with tissue expander    NASAL ENDOSCOPY  2018    OTHER SURGICAL HISTORY  1981,1982,1983    Knee surgery, elbow and tubal ligation    OVARIAN CYST REMOVAL      TONSILLECTOMY      TUBAL LIGATION      also had uterine ablation    ULNAR NERVE TRANSPOSITION Left     WISDOM TOOTH EXTRACTION  1985   [3]   Allergies  Allergen Reactions    Latex Rash    Tetanus Vaccines And Toxoid Fever   [4]   Current Outpatient Medications:     acetaminophen (Tylenol Extra Strength) 500 mg tablet, Take 2 tablets (1,000 mg) by mouth every 8 hours for 14 days., Disp: 84 tablet, Rfl: 0    amitriptyline (Elavil) 25 mg tablet, Take 1 tablet (25 mg) by mouth as needed at bedtime for sleep., Disp: 30 tablet, Rfl: 1    amLODIPine (Norvasc) 10 mg tablet, Take 1 tablet (10  mg) by mouth once daily., Disp: 90 tablet, Rfl: 3    ASPIRIN LOW-STRENGTH ORAL, Take 81 mg by mouth every other day., Disp: , Rfl:     celecoxib (CeleBREX) 200 mg capsule, Take 1 capsule (200 mg) by mouth 2 times a day for 14 days., Disp: 28 capsule, Rfl: 0    cholecalciferol (Vitamin D-3) 25 MCG (1000 UT) capsule, Take 1 capsule (25 mcg) by mouth once daily., Disp: , Rfl:     escitalopram (Lexapro) 10 mg tablet, Take 0.5 tablets (5 mg) by mouth once daily., Disp: 45 tablet, Rfl: 3    exemestane (Aromasin) 25 mg tablet, Take 1 tablet (25 mg total) by mouth once daily.  Take after a meal.  Try to take at the same time each day., Disp: 90 tablet, Rfl: 4    gabapentin (Neurontin) 300 mg capsule, Take 1 capsule (300 mg) by mouth every 8 hours for 14 days., Disp: 42 capsule, Rfl: 0    hydrocortisone 2.5 % cream, , Disp: , Rfl:     levothyroxine (Synthroid, Levoxyl) 25 mcg tablet, Take 1 tablet (25 mcg) by mouth once daily in the morning., Disp: 90 tablet, Rfl: 3    losartan (Cozaar) 100 mg tablet, Take 1 tablet (100 mg) by mouth once daily., Disp: 90 tablet, Rfl: 0    propranolol (Inderal) 10 mg tablet, Take 1 tablet (10 mg) by mouth 2 times a day., Disp: 180 tablet, Rfl: 1    rizatriptan MLT (Maxalt-MLT) 10 mg disintegrating tablet, Take 1 tablet (10 mg) by mouth 1 time if needed for migraine. May repeat once in 2 hours if needed (Maximum 2 tablets in 24 hours), Disp: 9 tablet, Rfl: 1    rosuvastatin (Crestor) 20 mg tablet, Take 0.5 tablets (10 mg) by mouth once daily., Disp: 45 tablet, Rfl: 3    sulfamethoxazole-trimethoprim (Bactrim DS) 800-160 mg tablet, Take 1 tablet by mouth 2 times a day for 14 days., Disp: 28 tablet, Rfl: 0  [5]   Family History  Problem Relation Name Age of Onset    Atrial fibrillation Mother Megan     Other (htn) Mother Megan     Multiple myeloma Mother Megan     Peripheral vascular disease Mother Megan     Other (thyroid disorder) Mother Megan     Arthritis Mother Megan     Cancer Mother Megan      Thyroid disease Mother Megan     Hearing loss Mother Megan     Heart disease Mother Megan     Liver cancer Father Kenneth     Cancer Father Kenneth     Atrial fibrillation Sister Tammy     Other (crest) Sister Tammy     Other (pulmonary htn) Sister Tammy     Other (pulmonary occlusive disease) Sister Tammy     Colonic polyp Sister Tammy     Peripheral vascular disease Sister Tammy     Other (thyroid disorder) Sister Tammy     Colon cancer Sister Tammy         detected abnormal cells and had surgery and was told family members should be screened every 5 years    Rheum arthritis Sister Tammy     Thyroid disease Sister Tammy     Atrial fibrillation Brother Jad     Diabetes Brother Jad     Other (cabg) Brother Jad     Peripheral vascular disease Brother Jad     Heart disease Brother Jad     Atrial fibrillation Brother Kenneth     Heart disease Brother Kenneth     Accidental death Brother Kenneth     Atrial fibrillation Brother Sudheer     Diabetes Brother Sudheer     Heart disease Brother Sudheer     Stroke Brother Sudheer     Hypertension Brother Sudheer     Other (CREST) Other      Accidental death Brother Elliot    [6]   Social History  Tobacco Use    Smoking status: Never     Passive exposure: Never    Smokeless tobacco: Never   Vaping Use    Vaping status: Never Used   Substance Use Topics    Alcohol use: Not Currently     Alcohol/week: 8.0 standard drinks of alcohol     Types: 2 Glasses of wine, 6 Cans of beer per week     Comment: Varies    Drug use: Never

## 2025-07-16 NOTE — TELEPHONE ENCOUNTER
Patient called regarding her wound vac beeping. She states she tried patching the area she believes there is a leak but this did not fix the problem. Discussed patient with Dr. Torres and was advised to make patient an appointment for today in order to troubleshoot vac or replace. Patient aware and appointment made for 1p today.

## 2025-07-17 NOTE — PROGRESS NOTES
"       PLASTIC SURGERY CLINIC VISIT  POSTOP BREAST RECONSTRUCTION     Date: 7/22/25  Date of Surgery: 7/9/25  Surgical Procedure: Revision of Reconstruction with Implants RIGHT breast         HPI:   Scott Leung 60 y.o. female is here for post-operative appointment for the above procedure(s).      Interval changes as of this date:   7/16: pt contacted our office regarding wound vac beeping. Came into clinic for evaluation and wound vac was taken down and replaced.   7/22: Doing well overall. ELIAS drains in place, output down trending appropriately. Following activity restrictions and endorses adequate protein intake.     MEDICATIONS  Current Medications[1]      OBJECTIVE [x]Expand by Default  Blood pressure 148/75, pulse 60, height 1.702 m (5' 7\"), weight 105 kg (232 lb).     REVIEW OF SYSTEMS:    Constitutional: negative for fevers, chills, unintentional weight loss  HEENT: negative for changes in vision, headaches, changes in hearing, congestion, sore throat  Cardiovascular: negative for chest pain, palpitations  Respiratory: negative for cough, shortness of breath  Gastrointestinal: negative for nausea, vomiting, diarrhea  Genitourinary: negative for dysuria, hematuria  Musculoskeletal: negative for joint swelling or pain  Skin: negative for rashes or lesions  Psych: negative for depression, anxiety  Endocrine: negative for polyuria, polydipsia, cold/heat intolerance  Hem/Lymph: negative for bleeding disorder     PHYSICAL EXAM  General: alert and oriented, no apparent distress    Focused exam of the breasts:  Right: Incision C//D/I, prolene sutures in place. Implant has maintained its position, soft and mobile. Breast flaps viable and well perfused.       ASSESSMENT/PLAN  Scott Leung 60 y.o. female who had Revision of Reconstruction with Implants RIGHT breast  on 7/9/25 who presents for POV.    Doing well post operatively, pain well controlled. Endorses adequate protein intake. Following activity restrictions. "     Preveena in place. Removed at this visit. Incisions healing well, sutures intact.     ELIAS drain(s) in place. No erythema or edema surrounding the drain site. There is serous output from the drain. Patient recorded output of the drains showed 2 consecutive days of less than 30cc output at time of removal. Patient was educated on purpose of surgical drains and informed of risk for seroma post drain removal.       ELIAS drain(s) removed at this visit: 1     Continue increased protein intake  Continue activity restrictions  Steristrips applied  Surgical bra fitted  OK to shower, instructed to blow-dry steri strips on a cool setting  Will plan to remove her remaining drain at her next visit. Continue antibiotics for another week.   Will plan to remove every other suture on her next visit     RTC 7/29/25 for drain and suture removal    TANO Garcia Attestation  By signing my name below, I BashirBrian Mccann, attest that this documentation has been prepared under the direction and in the presence of Danica Torres MD. Verbal consent obtained from the patient.           [1]   Current Outpatient Medications:     amitriptyline (Elavil) 25 mg tablet, Take 1 tablet (25 mg) by mouth as needed at bedtime for sleep., Disp: 30 tablet, Rfl: 1    amLODIPine (Norvasc) 10 mg tablet, Take 1 tablet (10 mg) by mouth once daily., Disp: 90 tablet, Rfl: 3    ASPIRIN LOW-STRENGTH ORAL, Take 81 mg by mouth every other day., Disp: , Rfl:     cholecalciferol (Vitamin D-3) 25 MCG (1000 UT) capsule, Take 1 capsule (25 mcg) by mouth once daily., Disp: , Rfl:     escitalopram (Lexapro) 10 mg tablet, Take 0.5 tablets (5 mg) by mouth once daily., Disp: 45 tablet, Rfl: 3    exemestane (Aromasin) 25 mg tablet, Take 1 tablet (25 mg total) by mouth once daily.  Take after a meal.  Try to take at the same time each day., Disp: 90 tablet, Rfl: 4    gabapentin (Neurontin) 300 mg capsule, Take 1 capsule (300 mg) by mouth every 8 hours  for 14 days., Disp: 42 capsule, Rfl: 0    hydrocortisone 2.5 % cream, , Disp: , Rfl:     levothyroxine (Synthroid, Levoxyl) 25 mcg tablet, Take 1 tablet (25 mcg) by mouth once daily in the morning., Disp: 90 tablet, Rfl: 3    losartan (Cozaar) 100 mg tablet, Take 1 tablet (100 mg) by mouth once daily., Disp: 90 tablet, Rfl: 0    propranolol (Inderal) 10 mg tablet, Take 1 tablet (10 mg) by mouth 2 times a day., Disp: 180 tablet, Rfl: 1    rizatriptan MLT (Maxalt-MLT) 10 mg disintegrating tablet, Take 1 tablet (10 mg) by mouth 1 time if needed for migraine. May repeat once in 2 hours if needed (Maximum 2 tablets in 24 hours), Disp: 9 tablet, Rfl: 1    rosuvastatin (Crestor) 20 mg tablet, Take 0.5 tablets (10 mg) by mouth once daily., Disp: 45 tablet, Rfl: 3

## 2025-07-22 ENCOUNTER — APPOINTMENT (OUTPATIENT)
Dept: PLASTIC SURGERY | Facility: CLINIC | Age: 60
End: 2025-07-22
Payer: COMMERCIAL

## 2025-07-22 VITALS
SYSTOLIC BLOOD PRESSURE: 148 MMHG | HEART RATE: 60 BPM | WEIGHT: 232 LBS | DIASTOLIC BLOOD PRESSURE: 75 MMHG | HEIGHT: 67 IN | BODY MASS INDEX: 36.41 KG/M2

## 2025-07-22 DIAGNOSIS — C50.111 MALIGNANT NEOPLASM OF CENTRAL PORTION OF RIGHT BREAST IN FEMALE, ESTROGEN RECEPTOR POSITIVE: ICD-10-CM

## 2025-07-22 DIAGNOSIS — Z17.0 MALIGNANT NEOPLASM OF CENTRAL PORTION OF RIGHT BREAST IN FEMALE, ESTROGEN RECEPTOR POSITIVE: ICD-10-CM

## 2025-07-22 RX ORDER — SULFAMETHOXAZOLE AND TRIMETHOPRIM 800; 160 MG/1; MG/1
1 TABLET ORAL 2 TIMES DAILY
Qty: 14 TABLET | Refills: 0 | Status: SHIPPED | OUTPATIENT
Start: 2025-07-22 | End: 2025-07-29

## 2025-07-24 ENCOUNTER — APPOINTMENT (OUTPATIENT)
Dept: OTOLARYNGOLOGY | Facility: CLINIC | Age: 60
End: 2025-07-24
Payer: COMMERCIAL

## 2025-07-24 DIAGNOSIS — R49.0 DYSPHONIA: ICD-10-CM

## 2025-07-24 DIAGNOSIS — K21.9 LARYNGOPHARYNGEAL REFLUX (LPR): ICD-10-CM

## 2025-07-24 DIAGNOSIS — R43.9 DISTURBANCES OF SENSATION OF SMELL AND TASTE: ICD-10-CM

## 2025-07-24 DIAGNOSIS — J38.7 LARYNGEAL GRANULOMA: Primary | ICD-10-CM

## 2025-07-24 DIAGNOSIS — R09.A2 GLOBUS PHARYNGEUS: ICD-10-CM

## 2025-07-24 DIAGNOSIS — J38.3 VOCAL FOLD SCAR: ICD-10-CM

## 2025-07-24 PROCEDURE — 31579 LARYNGOSCOPY TELESCOPIC: CPT | Performed by: OTOLARYNGOLOGY

## 2025-07-24 PROCEDURE — 99213 OFFICE O/P EST LOW 20 MIN: CPT | Performed by: OTOLARYNGOLOGY

## 2025-07-24 ASSESSMENT — PATIENT HEALTH QUESTIONNAIRE - PHQ9
2. FEELING DOWN, DEPRESSED OR HOPELESS: NOT AT ALL
1. LITTLE INTEREST OR PLEASURE IN DOING THINGS: NOT AT ALL
SUM OF ALL RESPONSES TO PHQ9 QUESTIONS 1 AND 2: 0

## 2025-07-24 NOTE — PROGRESS NOTES
ASSESSMENT AND PLAN:   Scott Leung is a 60 y.o. 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 She has improved wave on exam. She will follow up as needed. She has taste changes likely due to laryngoscopy. She will follow up with me if this is not improved. This risk was discussed prior to the procedure, but it is unusual for this persist long term.          Assessment & Plan  Patient presents with postoperative status following CO2 laser treatment for vocal cord scarring and altered sense of taste likely due to scope pressure during procedure.     We discussed options for management to include: monitoring symptoms and follow-up as needed.    Treatment plan:  - Improved vibratory wave, voice significantly better, residual scarring improved. Follow up as needed.  - Monitor altered sense of taste, follow up if no improvement.    Clinical decision making: Discussed risks, benefits, and alternatives of treatment. Advised to contact nurse if taste returns to normal.    Follow-up: Follow-up as needed.      Reason For Consult  Chief Complaint   Patient presents with    Post-op        HISTORY OF PRESENT ILLNESS:  Scott Leung is a 60 y.o. female presenting for a follow up visit with me for POV.  The patient reports that she is doing well.        Prior History:   Post op 6/20/25:      Path: No high-grade dysplasia or carcinoma     Granuloma of the right mid TVC, associated with Right TVC scar.  + Feeding vessel. Treated with CO2 laser.  Minor supraglottoplasty performed to reduce scar formation from the right ventricle to the TVC.  Injection 0.5 ml of Decadron (10mg/ml) and 0.3 ml of Prolaryn Gel bilaterally.      History of Present Illness  The patient is a 60-year-old female who recently had vocal cord surgery on June 20, 2025. She had a granuloma and scar on her mid right vocal cord treated with a CO2 laser and a small supraglottoplasty to reduce scar formation. The  pathology results showed no high-grade dysplasia or carcinoma.    Voice  - The patient reports that her voice is almost back to normal, though it occasionally gets a little raspy.    Altered Sense of Taste  - She is experiencing an altered sense of taste, describing everything as tasting unpleasant.  - Her sense of smell is unaffected.    Activity Level  - She has been less active lately.    Medication  - The patient is on medication for breast cancer and is unsure if this is related to her symptoms.    PAST SURGICAL HISTORY:  - Granuloma and vocal cord scar surgery on June 20, 2025  - Supraglottoplasty on June 20, 2025      Past Medical History  She has a past medical history of Arthritis, Breast cancer (02/23/2024), Depression, HTN (hypertension), Hyperlipidemia, Hypothyroidism, Insomnia, Joint pain, Low vitamin D level, Migraines, Obesity, Open wound of breast with complication, right, sequela (08/01/2024), Primary localized osteoarthrosis of left lower leg, Scoliosis (2021), Sensorineural hearing loss (SNHL) of right ear with restricted hearing of left ear, Situational anxiety, and TMJ arthropathy. Surgical History  She has a past surgical history that includes Knee arthroscopy w/ debridement (Left); Ovarian cyst removal; Tubal ligation; [place] tooth extraction (1985); Inner ear surgery (Right); Ulnar nerve transposition (Left); Mastectomy w/ sentinel node biopsy (Right, 06/10/2024); Nasal endoscopy (2018); Breast Capsulectomy (Right, 08/05/2024); Knee surgery (Bilateral); Other surgical history (1981,1982,1983); Colonoscopy; Mastectomy (6/10/24); Breast biopsy (2/23/2024); Cosmetic surgery (Breast Reconstruction); and Tonsillectomy (2002).   Social History  She reports that she has never smoked. She has never been exposed to tobacco smoke. She has never used smokeless tobacco. She reports current alcohol use of about 8.0 standard drinks of alcohol per week. She reports that she does not use drugs.  Allergies  Latex and Tetanus vaccines and toxoid     Family History  Family History[1]    Review of Systems  All 10 systems were reviewed and negative except for above.      Last Recorded Vitals  There were no vitals taken for this visit.    Physical Exam  ENT Physical Exam  Constitutional  Appearance: patient appears well-developed and well-nourished,  Head and Face  Appearance: head appears normal and face appears normal;  Ear  Auricles: right auricle normal; left auricle normal;  Nose  External Nose: nares patent bilaterally;  Oral Cavity/Oropharynx  Lips: normal;  Neck  Neck: neck normal; neck palpation normal;  Respiratory  Inspection: no retractions visible;  Cardiovascular  Inspection: no peripheral edema present;  Neurovestibular  Mental Status: alert and oriented;  Psychiatric: mood normal;  Cranial Nerves: cranial nerves intact;     Physical Exam  Oral Cavity: Full tongue mobility, intact sensation bilaterally, good palate elevation.  Stroboscopy: Improved vibratory wave. Vocal characteristics: grade 1, roughness 0, breathiness 0, asthenia 0, strain 1. Normal intelligibility, resonance, vocal loudness, breath support. No subglottic edema, thick mucus, granuloma. Partial ventricular obliteration. Mild interarytenoid thickening and vocal fold edema. No diffuse laryngitis. Symmetric arytenoid movement and height. No supraglottic tension. Normal symmetry, amplitude, phase, periodicity. Closed glottis during phonation.    Results  - Pathology specimen (06/20/2025):    - No high-grade dysplasia or carcinoma    - Stroboscopy (07/24/2025):    - Voice significantly better    - Residual scarring improved    - Vocal characteristics:      - Grade: 1      - Roughness: 0      - Breathiness: 0      - Asthenia: 0      - Strain: 1    - Normal intelligibility, resonance, vocal loudness, breath support    - No subglottic edema, thick mucus, granuloma    - Partial ventricular obliteration    - Mild interarytenoid thickening  and vocal fold edema    - No diffuse laryngitis    - Symmetric arytenoid movement and height    - No supraglottic tension    - Normal symmetry, amplitude, phase, periodicity    - Closed glottis during phonation         Procedures   Flexible Laryngoscopy w/ Videostroboscopy    VOICE AND SPEECH CHARACTERISTICS:  Normal spoken speech, (+) mild dysphonia, no roughness, no breathiness, no asthenia, (+) mild strain.    Intelligibility: normal.   Resonance: balanced.   Vocal Loudness: normal.   Breath Support: normal.    PROCEDURE:    Indications: voice change  PROCEDURE NOTE: FLEXIBLE LARYNGOSCOPY WITH STROBOSCOPY  I recommended a flexible laryngoscopy with stroboscopy based on PE findings, and/or concern for mucosal wave details based upon history and/or for issues associated with hyperreflexic gag on mirror exam concerning for pathology. Risks, benefits, and alternatives were explained. The patient wishes to proceed and gives verbal consent.   Patient is seated in the exam chair. After adequate topical anesthesia, I advance the flexible endoscope. The examination included evaluation of the zhao, vallecula, base of tongue, pyriforms, post-cricoid area, larynx and immediate subglottis.  Findings : assessment of the nasopharynx, base of tongue/vallecula, pyriform sinuses, post-cricoid area and pharyngeal walls was without lesion or mass, pharyngeal wall contraction is normal and symmetric, and no pooling of secretions  ventricular obliteration: 2 (present), IA thickenin (mild), TVC edema: 1 (mild), and diffuse laryngitis: 1 (mild)  Gross Arytenoid Movement: symmetric.  Arytenoid Height: normal.   Supraglottic Tension: none.  Symmetry: normal.   Amplitude: normal.  Phase Closure: in-phase.  Mucosal Wave Lateral Excursion/Secondary Wave: Bilateral Vocal Cord: no restriction - wave moved more than ½ the width of the vocal fold.  Periodicity: normal.  Closure: closed.    Time Spent  Prep time on day of patient encounter:  10 minutes  Time spent directly with patient, family or caregiver: 15 minutes  Additional Time Spent on Patient Care Activities/Discussion with SLP re care plan: 5 minutes  Documentation Time: 10 minutes  Other Time Spent: 0 minutes  Total: 40 minutes     Scribe Attestation  By signing my name below, Tara MORFIN , Scribe attest that this documentation has been prepared under the direction and in the presence of Warren Delong MD.      This medical note was created with the assistance of artificial intelligence (AI) for documentation purposes. The content has been reviewed and confirmed by the healthcare provider for accuracy and completeness. Patient consented to the use of audio recording and use of AI during their visit.          [1]   Family History  Problem Relation Name Age of Onset    Atrial fibrillation Mother Megan     Other (htn) Mother Megan     Multiple myeloma Mother Megan     Peripheral vascular disease Mother Megan     Other (thyroid disorder) Mother Megan     Arthritis Mother Megan     Cancer Mother Megan     Thyroid disease Mother Megan     Hearing loss Mother Megan     Heart disease Mother Megan     Liver cancer Father Kenneth     Cancer Father Kenneth     Atrial fibrillation Sister Tammy     Other (crest) Sister Tammy     Other (pulmonary htn) Sister Tammy     Other (pulmonary occlusive disease) Sister Tammy     Colonic polyp Sister Tammy     Peripheral vascular disease Sister Tammy     Other (thyroid disorder) Sister Tammy     Colon cancer Sister Tammy         detected abnormal cells and had surgery and was told family members should be screened every 5 years    Rheum arthritis Sister Tammy     Thyroid disease Sister Tammy     Atrial fibrillation Brother Jad     Diabetes Brother Jad     Other (cabg) Brother Jad     Peripheral vascular disease Brother Jad     Heart disease Brother Jad     Atrial fibrillation Brother Kenneth     Heart disease Brother Kenneth     Accidental death Brother Kenneth      Atrial fibrillation Brother Sudheer     Diabetes Brother Sudheer     Heart disease Brother Sudheer     Stroke Brother Sudheer     Hypertension Brother Sudheer     Other (CREST) Other      Accidental death Brother Elliot     Breast cancer Other Cousins - several

## 2025-07-24 NOTE — PROGRESS NOTES
[facility]AST[facility] SURGERY CLINIC VISIT  POSTOP BREAST RECONSTRUCTION     Date: 7/29/25  Date of Surgery: 7/9/25  Surgical Procedure: Revision of Reconstruction with Implants RIGHT breast         HPI:   Scott Leung 60 y.o. female is here for post-operative appointment for the above procedure(s).      Interval changes as of this date:   7/16: pt contacted our office regarding wound vac beeping. Came into clinic for evaluation and wound vac was taken down and replaced.   7/22: Doing well overall. ELIAS drains in place, output down trending appropriately. Following activity restrictions and endorses adequate protein intake.   7/29 Doing well overall. ELIAS drains in place with SS output     MEDICATIONS  Current Medications[1]      OBJECTIVE [x]Expand by Default  There were no vitals taken for this visit.     REVIEW OF SYSTEMS:    Constitutional: negative for fevers, chills, unintentional weight loss  HEENT: negative for changes in vision, headaches, changes in hearing, congestion, sore throat  Cardiovascular: negative for chest pain, palpitations  Respiratory: negative for cough, shortness of breath  Gastrointestinal: negative for nausea, vomiting, diarrhea  Genitourinary: negative for dysuria, hematuria  Musculoskeletal: negative for joint swelling or pain  Skin: negative for rashes or lesions  Psych: negative for depression, anxiety  Endocrine: negative for polyuria, polydipsia, cold/heat intolerance  Hem/Lymph: negative for bleeding disorder     PHYSICAL EXAM  General: alert and oriented, no apparent distress    Focused exam of the breasts:  Right: Incision C//D/I, prolene sutures in place. Implant has maintained its position, soft and mobile. Breast flaps viable and well perfused.       ASSESSMENT/PLAN  Scott Leung 60 y.o. female who had Revision of Reconstruction with Implants RIGHT breast  on 7/9/25 who presents for POV.    Doing well post operatively, pain well controlled. Endorses adequate protein intake. Following  activity restrictions.     ELIAS drain(s) in place. No erythema or edema surrounding the drain site. There is serous output from the drain. Patient recorded output of the drains showed 2 consecutive days of less than 30cc output at time of removal. Patient was educated on purpose of surgical drains and informed of risk for seroma post drain removal.       ELIAS drain(s) removed at this visit:      Continue increased protein intake  Continue activity restrictions  Steristrips applied  OK to shower, instructed to blow-dry steri strips on a cool setting         [1]   Current Outpatient Medications:     amitriptyline (Elavil) 25 mg tablet, Take 1 tablet (25 mg) by mouth as needed at bedtime for sleep., Disp: 30 tablet, Rfl: 1    amLODIPine (Norvasc) 10 mg tablet, Take 1 tablet (10 mg) by mouth once daily., Disp: 90 tablet, Rfl: 3    ASPIRIN LOW-STRENGTH ORAL, Take 81 mg by mouth every other day., Disp: , Rfl:     cholecalciferol (Vitamin D-3) 25 MCG (1000 UT) capsule, Take 1 capsule (25 mcg) by mouth once daily., Disp: , Rfl:     escitalopram (Lexapro) 10 mg tablet, Take 0.5 tablets (5 mg) by mouth once daily., Disp: 45 tablet, Rfl: 3    exemestane (Aromasin) 25 mg tablet, Take 1 tablet (25 mg total) by mouth once daily.  Take after a meal.  Try to take at the same time each day., Disp: 90 tablet, Rfl: 4    gabapentin (Neurontin) 300 mg capsule, Take 1 capsule (300 mg) by mouth every 8 hours for 14 days., Disp: 42 capsule, Rfl: 0    hydrocortisone 2.5 % cream, , Disp: , Rfl:     levothyroxine (Synthroid, Levoxyl) 25 mcg tablet, Take 1 tablet (25 mcg) by mouth once daily in the morning., Disp: 90 tablet, Rfl: 3    losartan (Cozaar) 100 mg tablet, Take 1 tablet (100 mg) by mouth once daily., Disp: 90 tablet, Rfl: 0    propranolol (Inderal) 10 mg tablet, Take 1 tablet (10 mg) by mouth 2 times a day., Disp: 180 tablet, Rfl: 1    rizatriptan MLT (Maxalt-MLT) 10 mg disintegrating tablet, Take 1 tablet (10 mg) by mouth 1 time if  needed for migraine. May repeat once in 2 hours if needed (Maximum 2 tablets in 24 hours), Disp: 9 tablet, Rfl: 1    rosuvastatin (Crestor) 20 mg tablet, Take 0.5 tablets (10 mg) by mouth once daily., Disp: 45 tablet, Rfl: 3    sulfamethoxazole-trimethoprim (Bactrim DS) 800-160 mg tablet, Take 1 tablet by mouth 2 times a day for 7 days., Disp: 14 tablet, Rfl: 0

## 2025-07-29 ENCOUNTER — APPOINTMENT (OUTPATIENT)
Dept: PLASTIC SURGERY | Facility: CLINIC | Age: 60
End: 2025-07-29
Payer: COMMERCIAL

## 2025-07-29 VITALS
WEIGHT: 232 LBS | BODY MASS INDEX: 36.41 KG/M2 | DIASTOLIC BLOOD PRESSURE: 77 MMHG | HEART RATE: 69 BPM | HEIGHT: 67 IN | SYSTOLIC BLOOD PRESSURE: 145 MMHG

## 2025-07-29 DIAGNOSIS — N65.1 BREAST ASYMMETRY BETWEEN NATIVE BREAST AND RECONSTRUCTED BREAST: ICD-10-CM

## 2025-07-29 DIAGNOSIS — Z98.890 S/P BREAST RECONSTRUCTION: Primary | ICD-10-CM

## 2025-07-30 LAB
ALBUMIN SERPL-MCNC: 4.9 G/DL (ref 3.6–5.1)
ALP SERPL-CCNC: 96 U/L (ref 37–153)
ALT SERPL-CCNC: 36 U/L (ref 6–29)
ANION GAP SERPL CALCULATED.4IONS-SCNC: 9 MMOL/L (CALC) (ref 7–17)
AST SERPL-CCNC: 27 U/L (ref 10–35)
BILIRUB SERPL-MCNC: 0.6 MG/DL (ref 0.2–1.2)
BUN SERPL-MCNC: 14 MG/DL (ref 7–25)
CALCIUM SERPL-MCNC: 10.1 MG/DL (ref 8.6–10.4)
CHLORIDE SERPL-SCNC: 104 MMOL/L (ref 98–110)
CHOLEST SERPL-MCNC: 202 MG/DL
CHOLEST/HDLC SERPL: 4.9 (CALC)
CK SERPL-CCNC: 59 U/L (ref 20–243)
CO2 SERPL-SCNC: 29 MMOL/L (ref 20–32)
CREAT SERPL-MCNC: 1.1 MG/DL (ref 0.5–1.05)
EGFRCR SERPLBLD CKD-EPI 2021: 57 ML/MIN/1.73M2
GLUCOSE SERPL-MCNC: 108 MG/DL (ref 65–99)
HDLC SERPL-MCNC: 41 MG/DL
LDLC SERPL CALC-MCNC: 119 MG/DL (CALC)
NONHDLC SERPL-MCNC: 161 MG/DL (CALC)
POTASSIUM SERPL-SCNC: 4.7 MMOL/L (ref 3.5–5.3)
PROT SERPL-MCNC: 7.2 G/DL (ref 6.1–8.1)
SODIUM SERPL-SCNC: 142 MMOL/L (ref 135–146)
TRIGL SERPL-MCNC: 271 MG/DL
TSH SERPL-ACNC: 1.65 MIU/L (ref 0.4–4.5)

## 2025-07-31 ENCOUNTER — OFFICE VISIT (OUTPATIENT)
Dept: PLASTIC SURGERY | Facility: CLINIC | Age: 60
End: 2025-07-31
Payer: COMMERCIAL

## 2025-07-31 VITALS
BODY MASS INDEX: 36.41 KG/M2 | HEART RATE: 68 BPM | HEIGHT: 67 IN | DIASTOLIC BLOOD PRESSURE: 85 MMHG | WEIGHT: 232 LBS | SYSTOLIC BLOOD PRESSURE: 143 MMHG

## 2025-07-31 DIAGNOSIS — C50.111 MALIGNANT NEOPLASM OF CENTRAL PORTION OF RIGHT BREAST IN FEMALE, ESTROGEN RECEPTOR POSITIVE: Primary | ICD-10-CM

## 2025-07-31 DIAGNOSIS — Z17.0 MALIGNANT NEOPLASM OF CENTRAL PORTION OF RIGHT BREAST IN FEMALE, ESTROGEN RECEPTOR POSITIVE: Primary | ICD-10-CM

## 2025-07-31 PROCEDURE — 3077F SYST BP >= 140 MM HG: CPT | Performed by: PHYSICIAN ASSISTANT

## 2025-07-31 PROCEDURE — 3008F BODY MASS INDEX DOCD: CPT | Performed by: PHYSICIAN ASSISTANT

## 2025-07-31 PROCEDURE — 99024 POSTOP FOLLOW-UP VISIT: CPT | Performed by: PHYSICIAN ASSISTANT

## 2025-07-31 PROCEDURE — 3079F DIAST BP 80-89 MM HG: CPT | Performed by: PHYSICIAN ASSISTANT

## 2025-07-31 ASSESSMENT — PAIN SCALES - GENERAL: PAINLEVEL_OUTOF10: 0-NO PAIN

## 2025-07-31 NOTE — PROGRESS NOTES
PLASTIC SURGERY CLINIC VISIT  POSTOP BREAST RECONSTRUCTION     Date: 7/29/25  Date of Surgery: 7/9/25  Surgical Procedure: Revision of Reconstruction with Implants RIGHT breast         HPI:   Scott Leung 60 y.o. female is here for post-operative appointment for the above procedure(s).      Interval changes as of this date:   7/16: pt contacted our office regarding wound vac beeping. Came into clinic for evaluation and wound vac was taken down and replaced.   7/22: Doing well overall. ELIAS drains in place, output down trending appropriately. Following activity restrictions and endorses adequate protein intake.   7/29 Doing well overall. ELIAS drains in place with SS output, endorses adequate protein intake and activity restriction. ELIAS drain out remains too elevated for removal.   7/31: doing well overall. ELIAS drains in place with SS output. Endorses adequate protein intake and activity restrictions. Here today for drain evaluation and possible removal.     MEDICATIONS  Current Medications[1]      OBJECTIVE [x]Expand by Default  There were no vitals taken for this visit.     REVIEW OF SYSTEMS:    Constitutional: negative for fevers, chills, unintentional weight loss  HEENT: negative for changes in vision, headaches, changes in hearing, congestion, sore throat  Cardiovascular: negative for chest pain, palpitations  Respiratory: negative for cough, shortness of breath  Gastrointestinal: negative for nausea, vomiting, diarrhea  Genitourinary: negative for dysuria, hematuria  Musculoskeletal: negative for joint swelling or pain  Skin: negative for rashes or lesions  Psych: negative for depression, anxiety  Endocrine: negative for polyuria, polydipsia, cold/heat intolerance  Hem/Lymph: negative for bleeding disorder     PHYSICAL EXAM  General: alert and oriented, no apparent distress    Focused exam of the breasts:  Right: Incision C//D/I, prolene sutures in place. Implant has maintained its position, soft and mobile.  Breast flaps viable and well perfused. Jpx1 with SS output.     ASSESSMENT/PLAN  Scott Leung 60 y.o. female who had Revision of Reconstruction with Implants RIGHT breast  on 7/9/25 who presents for POV.    Doing well post operatively, pain well controlled. Endorses adequate protein intake. Following activity restrictions.     Incision well healed, sutures removed and steri strips applied. ELIAS output remains too high for removal.      Continue increased protein intake  Continue activity restrictions  Steristrips applied  OK to shower, instructed to blow-dry steri strips on a cool setting  ELIAS drain removed today 7/31  Will obtain pictures at next visit    RTC next Thursday with Kacey         [1]   Current Outpatient Medications:     amitriptyline (Elavil) 25 mg tablet, Take 1 tablet (25 mg) by mouth as needed at bedtime for sleep., Disp: 30 tablet, Rfl: 1    amLODIPine (Norvasc) 10 mg tablet, Take 1 tablet (10 mg) by mouth once daily., Disp: 90 tablet, Rfl: 3    ASPIRIN LOW-STRENGTH ORAL, Take 81 mg by mouth every other day., Disp: , Rfl:     cholecalciferol (Vitamin D-3) 25 MCG (1000 UT) capsule, Take 1 capsule (25 mcg) by mouth once daily., Disp: , Rfl:     escitalopram (Lexapro) 10 mg tablet, Take 0.5 tablets (5 mg) by mouth once daily., Disp: 45 tablet, Rfl: 3    exemestane (Aromasin) 25 mg tablet, Take 1 tablet (25 mg total) by mouth once daily.  Take after a meal.  Try to take at the same time each day., Disp: 90 tablet, Rfl: 4    gabapentin (Neurontin) 300 mg capsule, Take 1 capsule (300 mg) by mouth every 8 hours for 14 days., Disp: 42 capsule, Rfl: 0    hydrocortisone 2.5 % cream, , Disp: , Rfl:     levothyroxine (Synthroid, Levoxyl) 25 mcg tablet, Take 1 tablet (25 mcg) by mouth once daily in the morning., Disp: 90 tablet, Rfl: 3    losartan (Cozaar) 100 mg tablet, Take 1 tablet (100 mg) by mouth once daily., Disp: 90 tablet, Rfl: 0    propranolol (Inderal) 10 mg tablet, Take 1 tablet (10 mg) by mouth 2 times  a day., Disp: 180 tablet, Rfl: 1    rizatriptan MLT (Maxalt-MLT) 10 mg disintegrating tablet, Take 1 tablet (10 mg) by mouth 1 time if needed for migraine. May repeat once in 2 hours if needed (Maximum 2 tablets in 24 hours), Disp: 9 tablet, Rfl: 1    rosuvastatin (Crestor) 20 mg tablet, Take 0.5 tablets (10 mg) by mouth once daily., Disp: 45 tablet, Rfl: 3

## 2025-08-05 ENCOUNTER — APPOINTMENT (OUTPATIENT)
Dept: SURGICAL ONCOLOGY | Facility: CLINIC | Age: 60
End: 2025-08-05
Payer: COMMERCIAL

## 2025-08-05 ENCOUNTER — APPOINTMENT (OUTPATIENT)
Dept: RADIOLOGY | Facility: CLINIC | Age: 60
End: 2025-08-05
Payer: COMMERCIAL

## 2025-08-19 ENCOUNTER — APPOINTMENT (OUTPATIENT)
Dept: PLASTIC SURGERY | Facility: CLINIC | Age: 60
End: 2025-08-19
Payer: COMMERCIAL

## 2025-08-19 VITALS
SYSTOLIC BLOOD PRESSURE: 141 MMHG | WEIGHT: 231.48 LBS | HEIGHT: 67 IN | HEART RATE: 68 BPM | DIASTOLIC BLOOD PRESSURE: 85 MMHG | BODY MASS INDEX: 36.33 KG/M2

## 2025-08-19 DIAGNOSIS — Z98.890 S/P BREAST RECONSTRUCTION: Primary | ICD-10-CM

## 2025-08-19 ASSESSMENT — PAIN SCALES - GENERAL: PAINLEVEL_OUTOF10: 0-NO PAIN

## 2025-09-12 ENCOUNTER — APPOINTMENT (OUTPATIENT)
Dept: RADIOLOGY | Facility: CLINIC | Age: 60
End: 2025-09-12
Payer: COMMERCIAL

## 2025-09-16 ENCOUNTER — APPOINTMENT (OUTPATIENT)
Dept: RADIOLOGY | Facility: CLINIC | Age: 60
End: 2025-09-16
Payer: COMMERCIAL

## 2025-10-10 ENCOUNTER — APPOINTMENT (OUTPATIENT)
Dept: PRIMARY CARE | Facility: CLINIC | Age: 60
End: 2025-10-10
Payer: COMMERCIAL

## 2026-08-18 ENCOUNTER — APPOINTMENT (OUTPATIENT)
Dept: PLASTIC SURGERY | Facility: CLINIC | Age: 61
End: 2026-08-18
Payer: COMMERCIAL

## (undated) DEVICE — RETRACTOR, CORDLESS, ONETRAC LX LIGHTED, 135MM X 30MM

## (undated) DEVICE — DENTITION PROTECTOR, QUICKGUARD, NON-PERF

## (undated) DEVICE — TOWEL, SURGICAL, NEURO, O/R, 16 X 26, BLUE, STERILE

## (undated) DEVICE — Device

## (undated) DEVICE — PAD, EYE, OVAL, 1 5/8 X 2 5/8 IN, STERILE

## (undated) DEVICE — BANDAGE, GAUZE, CONFORMING, KERLIX, 6 PLY, 4.5 IN X 4.1 YD

## (undated) DEVICE — ELECTRODE, ELECTROSURGICAL, BLADE, NONSTICK, MODIFIED, 6.5 IN X 165 MM

## (undated) DEVICE — NEEDLE, HYPODERMIC, 23 GA X 1.5 IN

## (undated) DEVICE — SUTURE, PROLENE, 0, 30 IN, CT1, BLUE

## (undated) DEVICE — ELECTRODE, ELECTROSURGICAL, PENCIL, HAND CONTROL, BLADE, W/HOLSTER, 10 FT CORD, LF

## (undated) DEVICE — BANDAGE, ELASTIC, FLEXMASTER, 6 IN, DBL LENGTH, STERILE

## (undated) DEVICE — KIT, PREVENA RESTOR BELLA FORM, MED, 24X22

## (undated) DEVICE — SUTURE, MONOCRYL, 4-0, 27 IN, PS-2, UNDYED

## (undated) DEVICE — DRESSING, ANTIMICROBIAL, W/7 MM CENTER HOLE, W/CHLORHEXIDINE GLUCONATE, BIOPATCH, 1 IN DISK

## (undated) DEVICE — SUTURE, PDS II, 2-0, 27 IN, CT-1 VIL MONO, LF

## (undated) DEVICE — REMOVER, STAPLE, PREMIUM

## (undated) DEVICE — WOUND SYSTEM, DEBRIDEMENT & CLEANING, O.R DUOPAK

## (undated) DEVICE — SYRINGE, HYPODERMIC, CONTROL, LUER LOCK, 10 CC, PLASTIC, STERILE

## (undated) DEVICE — SPONGE, LAP, XRAY DECT, 18IN X 18IN, W/MASTER DMT, STERILE

## (undated) DEVICE — SYRINGE, 10 CC, LUER LOCK

## (undated) DEVICE — ELECTRODE, ELECTROSURGICAL, BLADE, INSULATED, ENT/IMA, STERILE

## (undated) DEVICE — TUBING, SUCTION, NON-CONDUCTIVE, W/CONNECT,.25 IN X 12 FT, STERILE, LF

## (undated) DEVICE — DRAPE, INSTRUMENT, W/POUCH, STERI DRAPE, 7 X 11 IN, DISPOSABLE, STERILE

## (undated) DEVICE — STRIP, SKIN CLOSURE, STERI STRIP, REINFORCED, 1 X 5 IN

## (undated) DEVICE — MARKER, SKIN, DUAL TIP, W/RULER

## (undated) DEVICE — SUTURE, VICRYL, 3-0, 27 IN, SH, VIOLET

## (undated) DEVICE — DRESSING, GAUZE, PETROLATUM, STRIP OVERWRAP, XEROFORM, 5 X 9 IN, STERILE

## (undated) DEVICE — DRAPE, INCISE, ANTIMICROBIAL, IOBAN 2, LARGE, 17 X 23 IN, DISPOSABLE, STERILE

## (undated) DEVICE — PAD, GROUNDING, ELECTROSURGICAL, W/9 FT CABLE, POLYHESIVE II, ADULT, LF

## (undated) DEVICE — DRESSING, GAUZE, PETROLATUM, XEROFORM, 5 X 9 IN, STERILE

## (undated) DEVICE — SUTURE, PROLENE, 5-0, 18 IN, P3, BLUE

## (undated) DEVICE — DRESSING, ABDOMINAL, TENDERSORB, 8 X 10 IN, STERILE

## (undated) DEVICE — SUTURE, VICRYL, 2-0, 36 IN, CT-1, UNDYED

## (undated) DEVICE — TIP, SUCTION, YANKAUER, BULB, ADULT

## (undated) DEVICE — YANKAUER, RIGID, STRAIGHT, OPEN TIP, NO VENT

## (undated) DEVICE — STRIP, SKIN CLOSURE, STERI STRIP, REINFORCED, 0.5 X 4 IN

## (undated) DEVICE — DRESSING, TRANSPARENT, TEGADERM, 4 X 4-3/4 IN

## (undated) DEVICE — GLOVE, SURGICAL, BIOGEL PI MICRO INDICATOR UNDERGLOVE PF LF SZ 7 BLUE

## (undated) DEVICE — SUTURE, ETHILON, 2-0, 18 IN, FS, BLACK, BX/36

## (undated) DEVICE — MARKER, SURGICAL, SKIN, REG TIP, W/ RULER & LABELS

## (undated) DEVICE — SOLUTION, IRRIGATION, SODIUM CHLORIDE 0.9%, 1000 ML, POUR BOTTLE

## (undated) DEVICE — SUTURE, MONOCRYL, 3-0, 27 IN, PS-2, UNDYED

## (undated) DEVICE — STOPCOCK, 3 WAY, FEMALE/MALE LUER LOCK

## (undated) DEVICE — THERAPY UNIT, 14-DAY PREVENA PLUS 125

## (undated) DEVICE — BANDAGE, ELASTIC, ACE, ACE, DOUBLE LENGTH, 6 X 550 IN, LF

## (undated) DEVICE — SUTURE, PROLENE, 5-0, 18 IN, PS3, BLUE

## (undated) DEVICE — DRESSING, GAUZE, KERLIX, 12 PLY, 4 X 4 IN, STERILE

## (undated) DEVICE — CLIP, LIGATING, HORIZON, MEDIUM, TITANIUM

## (undated) DEVICE — DRESSING, SPONGE, GAUZE, CURITY, 4 X 4 IN, STERILE

## (undated) DEVICE — STAPLER, SKIN PROXIMATE, 35 WIDE

## (undated) DEVICE — DRESSING, NON-ADHERENT, TELFA, OUCHLESS, 3 X 8 IN, STERILE

## (undated) DEVICE — NEEDLE, HYPODERMIC, REGULAR WALL, REGULAR BEVEL, 20 G X 1.5 IN

## (undated) DEVICE — TUBING SET, SOFTOUCH PUMP, SINGLE SPIKE

## (undated) DEVICE — SUTURE, MONOCRYL, 3-0, 18 IN, PS2, UNDYED

## (undated) DEVICE — MANIFOLD, 4 PORT NEPTUNE STANDARD

## (undated) DEVICE — GLOVE, SURGICAL, BIOGEL PI ULTRA TOUCH SYN PF LF SZ 6.5

## (undated) DEVICE — TIP,  ELECTRODE COATED INSULATED, EXTENDED, LF

## (undated) DEVICE — ADHESIVE, SKIN, LIQUIBAND EXCEED

## (undated) DEVICE — KIT, MINOR, DOUBLE BASIN

## (undated) DEVICE — TUBING, SUCTION, CONNECTING, NON-CONDUCTIVE, W/CONNECTOR, 6 FT

## (undated) DEVICE — POSITIONING KIT, PAGAZZI, PINK PAD XL, W/ ARM AND HEAD REST

## (undated) DEVICE — ELECTRODE, ELECTROSURGICAL, BLADE EXT 4 INCH, INSULATED

## (undated) DEVICE — ANTIFOG, SOLUTION, FOG-OUT

## (undated) DEVICE — EVACUATOR, WOUND, SUCTION, CLOSED, JACKSON-PRATT, 100 CC, SILICONE

## (undated) DEVICE — SYRINGE, 60 CC, IRRIGATION, BULB, CONTRO-BULB, PAPER POUCH

## (undated) DEVICE — MARKER, SKIN, DUAL TIP INK W/9 LABEL AND REMOVABLE TIME OUT SLEEVE

## (undated) DEVICE — DRESSING, ISLAND, TELFA, 4 X 5 IN

## (undated) DEVICE — DRESSING, GAUZE, FLUFF, 1 PLY, 18 X 36 IN

## (undated) DEVICE — SUTURE, PROLENE, 5-0, 18 IN, PS2, BLUE

## (undated) DEVICE — NEEDLE, HYPODERMIC, 25 G X 1.5 IN, A BEVEL, STERILE

## (undated) DEVICE — DRESSING, TEGADERM, CHG, 3.5 X 4.5 IN

## (undated) DEVICE — CLIP, LIGATING, W/ADHESIVE PAD, MEDIUM, TITANIUM

## (undated) DEVICE — ACUSPOT 712 MICROMANIPULATOR

## (undated) DEVICE — APPLICATOR, COTTON TIP, 6 IN, 2PK, STERILE

## (undated) DEVICE — NEEDLE, HYPODERMIC, MONOJECT, 27 G X 1.5 IN

## (undated) DEVICE — SOLUTION, INJECTION, USP, NACL, SODIUM CHLORIDE 0.45%, 1000 ML, BAG

## (undated) DEVICE — SYRINGE, 1 CC, LUER LOCK

## (undated) DEVICE — COVER, BACK TABLE, 65 X 90, HVY REINFORCED

## (undated) DEVICE — DRAPE, SHEET, THREE QUARTER, FAN FOLD, 57 X 77 IN

## (undated) DEVICE — SUTURE, VICRYL, 3-0, 27 IN, SH

## (undated) DEVICE — SYRINGE, 60 CC, IRRIGATION, PISTON, CATH TIP, W/LUER ADAPTER,DISP

## (undated) DEVICE — APPLICATOR, FOAM BARRIER, LARGE